# Patient Record
Sex: MALE | Race: WHITE | NOT HISPANIC OR LATINO | Employment: OTHER | URBAN - METROPOLITAN AREA
[De-identification: names, ages, dates, MRNs, and addresses within clinical notes are randomized per-mention and may not be internally consistent; named-entity substitution may affect disease eponyms.]

---

## 2017-01-03 RX ORDER — ALBUTEROL SULFATE 90 UG/1
AEROSOL, METERED RESPIRATORY (INHALATION)
Qty: 8.5 INHALER | Refills: 12 | Status: SHIPPED | OUTPATIENT
Start: 2017-01-03 | End: 2018-01-16 | Stop reason: SDUPTHER

## 2017-04-18 RX ORDER — BUDESONIDE AND FORMOTEROL FUMARATE DIHYDRATE 160; 4.5 UG/1; UG/1
AEROSOL RESPIRATORY (INHALATION)
Qty: 10.2 INHALER | Refills: 8 | Status: SHIPPED | OUTPATIENT
Start: 2017-04-18 | End: 2018-02-12 | Stop reason: SDUPTHER

## 2017-06-18 RX ORDER — LISINOPRIL 20 MG/1
TABLET ORAL
Qty: 30 TABLET | Refills: 0 | Status: SHIPPED | OUTPATIENT
Start: 2017-06-18 | End: 2017-07-17 | Stop reason: SDUPTHER

## 2017-07-17 RX ORDER — LISINOPRIL 20 MG/1
TABLET ORAL
Qty: 30 TABLET | Refills: 0 | Status: SHIPPED | OUTPATIENT
Start: 2017-07-17 | End: 2017-08-16 | Stop reason: SDUPTHER

## 2017-08-16 RX ORDER — TIOTROPIUM BROMIDE 18 UG/1
CAPSULE ORAL; RESPIRATORY (INHALATION)
Qty: 30 CAPSULE | Refills: 7 | Status: SHIPPED | OUTPATIENT
Start: 2017-08-16 | End: 2018-03-22 | Stop reason: SDUPTHER

## 2017-08-16 RX ORDER — LISINOPRIL 20 MG/1
TABLET ORAL
Qty: 30 TABLET | Refills: 0 | Status: SHIPPED | OUTPATIENT
Start: 2017-08-16 | End: 2017-09-15 | Stop reason: SDUPTHER

## 2017-09-15 RX ORDER — LISINOPRIL 20 MG/1
TABLET ORAL
Qty: 30 TABLET | Refills: 1 | Status: SHIPPED | OUTPATIENT
Start: 2017-09-15 | End: 2017-11-14 | Stop reason: SDUPTHER

## 2017-11-14 RX ORDER — LISINOPRIL 20 MG/1
TABLET ORAL
Qty: 30 TABLET | Refills: 2 | Status: SHIPPED | OUTPATIENT
Start: 2017-11-14 | End: 2018-01-16 | Stop reason: SDUPTHER

## 2018-01-16 RX ORDER — ALBUTEROL SULFATE 90 UG/1
AEROSOL, METERED RESPIRATORY (INHALATION)
Qty: 8.5 G | Refills: 12 | Status: SHIPPED | OUTPATIENT
Start: 2018-01-16 | End: 2018-05-02

## 2018-01-16 RX ORDER — LISINOPRIL 20 MG/1
TABLET ORAL
Qty: 30 TABLET | Refills: 0 | Status: SHIPPED | OUTPATIENT
Start: 2018-01-16 | End: 2018-02-17 | Stop reason: SDUPTHER

## 2018-02-12 DIAGNOSIS — J44.9 CHRONIC OBSTRUCTIVE PULMONARY DISEASE, UNSPECIFIED COPD TYPE: Primary | ICD-10-CM

## 2018-02-12 RX ORDER — BUDESONIDE AND FORMOTEROL FUMARATE DIHYDRATE 160; 4.5 UG/1; UG/1
2 AEROSOL RESPIRATORY (INHALATION) EVERY 12 HOURS
Qty: 10.2 INHALER | Refills: 1 | Status: SHIPPED | OUTPATIENT
Start: 2018-02-12 | End: 2018-05-02

## 2018-02-12 NOTE — TELEPHONE ENCOUNTER
----- Message from Dilia Gustafson sent at 2/12/2018 10:08 AM CST -----  Contact: [facility]  Out of med   -     [facility]    Faxed 2/9   No response    SYMBICORT 160-4.5 mcg/actuation [facility]  [facility] [place] [address] - [place] - [address] [facility]

## 2018-02-17 RX ORDER — LISINOPRIL 20 MG/1
TABLET ORAL
Qty: 30 TABLET | Refills: 0 | Status: SHIPPED | OUTPATIENT
Start: 2018-02-17 | End: 2018-03-22 | Stop reason: SDUPTHER

## 2018-03-22 RX ORDER — LISINOPRIL 20 MG/1
TABLET ORAL
Qty: 30 TABLET | Refills: 0 | Status: SHIPPED | OUTPATIENT
Start: 2018-03-22 | End: 2018-06-19 | Stop reason: SDUPTHER

## 2018-03-22 RX ORDER — TIOTROPIUM BROMIDE 18 UG/1
CAPSULE ORAL; RESPIRATORY (INHALATION)
Qty: 30 CAPSULE | Refills: 7 | Status: SHIPPED | OUTPATIENT
Start: 2018-03-22 | End: 2018-05-02

## 2018-04-20 DIAGNOSIS — R05.9 COUGH: Primary | ICD-10-CM

## 2018-05-02 ENCOUNTER — HOSPITAL ENCOUNTER (OUTPATIENT)
Dept: PULMONOLOGY | Facility: CLINIC | Age: 63
Discharge: HOME | End: 2018-05-02
Payer: COMMERCIAL

## 2018-05-02 ENCOUNTER — OFFICE VISIT (OUTPATIENT)
Dept: PULMONOLOGY | Facility: CLINIC | Age: 63
End: 2018-05-02
Payer: COMMERCIAL

## 2018-05-02 VITALS
OXYGEN SATURATION: 99 % | HEART RATE: 67 BPM | HEIGHT: 66 IN | SYSTOLIC BLOOD PRESSURE: 142 MMHG | BODY MASS INDEX: 26.52 KG/M2 | RESPIRATION RATE: 14 BRPM | DIASTOLIC BLOOD PRESSURE: 88 MMHG | WEIGHT: 165 LBS

## 2018-05-02 DIAGNOSIS — I10 ESSENTIAL HYPERTENSION: ICD-10-CM

## 2018-05-02 DIAGNOSIS — K21.9 GASTROESOPHAGEAL REFLUX DISEASE, ESOPHAGITIS PRESENCE NOT SPECIFIED: ICD-10-CM

## 2018-05-02 DIAGNOSIS — R05.9 COUGH: ICD-10-CM

## 2018-05-02 DIAGNOSIS — R93.89 ABNORMAL CHEST X-RAY: ICD-10-CM

## 2018-05-02 DIAGNOSIS — J44.9 CHRONIC OBSTRUCTIVE PULMONARY DISEASE, UNSPECIFIED COPD TYPE: ICD-10-CM

## 2018-05-02 DIAGNOSIS — J43.2 CENTRILOBULAR EMPHYSEMA: Primary | ICD-10-CM

## 2018-05-02 LAB
PRE FEV1 FVC: 50
PRE FEV1: 1.67
PRE FVC: 3.36
PREDICTED FEV1 FVC: 80
PREDICTED FEV1: 2.78
PREDICTED FVC: 3.45

## 2018-05-02 PROCEDURE — 3079F DIAST BP 80-89 MM HG: CPT | Mod: CPTII,S$GLB,, | Performed by: INTERNAL MEDICINE

## 2018-05-02 PROCEDURE — 94010 BREATHING CAPACITY TEST: CPT | Mod: S$GLB,,, | Performed by: INTERNAL MEDICINE

## 2018-05-02 PROCEDURE — 3077F SYST BP >= 140 MM HG: CPT | Mod: CPTII,S$GLB,, | Performed by: INTERNAL MEDICINE

## 2018-05-02 PROCEDURE — 99214 OFFICE O/P EST MOD 30 MIN: CPT | Mod: 25,S$GLB,, | Performed by: INTERNAL MEDICINE

## 2018-05-02 PROCEDURE — 99999 PR PBB SHADOW E&M-EST. PATIENT-LVL IV: CPT | Mod: PBBFAC,,, | Performed by: INTERNAL MEDICINE

## 2018-05-02 PROCEDURE — 94729 DIFFUSING CAPACITY: CPT | Mod: S$GLB,,, | Performed by: INTERNAL MEDICINE

## 2018-05-02 RX ORDER — TIOTROPIUM BROMIDE 18 UG/1
18 CAPSULE ORAL; RESPIRATORY (INHALATION) DAILY
Qty: 30 CAPSULE | Refills: 6 | Status: SHIPPED | OUTPATIENT
Start: 2018-05-02 | End: 2019-02-04 | Stop reason: SDUPTHER

## 2018-05-02 RX ORDER — ALBUTEROL SULFATE 90 UG/1
2 AEROSOL, METERED RESPIRATORY (INHALATION) EVERY 6 HOURS PRN
Qty: 1 EACH | Refills: 6 | Status: SHIPPED | OUTPATIENT
Start: 2018-05-02 | End: 2020-01-15 | Stop reason: SDUPTHER

## 2018-05-02 RX ORDER — BUDESONIDE AND FORMOTEROL FUMARATE DIHYDRATE 160; 4.5 UG/1; UG/1
2 AEROSOL RESPIRATORY (INHALATION) EVERY 12 HOURS
Qty: 1 INHALER | Refills: 12 | Status: SHIPPED | OUTPATIENT
Start: 2018-05-02 | End: 2019-03-22 | Stop reason: SDUPTHER

## 2018-05-02 NOTE — PROGRESS NOTES
Subjective:       Patient ID: Nilo Hoover is a 62 y.o. male.    Chief Complaint: COPD    HPI Mr. Hoover is a 62-year-old former smoker who returns for interval assessment   of chronic obstructive lung disease.  His most recent previous visit here was in   May 2016.  During these past 2 years, he has continued to use bronchodilator   medications as planned.  His current regimen consists of Spiriva, Symbicort and   albuterol.  He has not recognized any adverse effects related to these   medications.  He has not had any acute exacerbations of respiratory symptoms.    He has not had to make use of antibiotics or oral corticosteroids for treatment   of respiratory symptoms within the past 6 to 12 months.    Mr. Hoover remains active.  He does report shortness of breath with moderate   exertion such as mowing his lawn.  He also remains on Prilosec for control of   upper GI symptoms attributed to gastroesophageal reflux.  He takes lisinopril   for treatment of high blood pressure.  He has never recognized any adverse   respiratory effects related to this medication.    DATA:  I have reviewed the results of pulmonary function studies done earlier   today.  The forced vital capacity is 3.36 L, which is 96% of predicted.  The   FEV1 is 1.67 L or 60% of predicted.  The ratio of these values is 50%.  The   diffusion capacity is 18.0.  This is 72% of the expected value.  When today's   pulmonary function study results are compared to previous studies from 2012, and   2009, there has been a modest decline in the vital capacity.  The FEV1 value   has been variable, which likely reflects his varying degrees of airway   obstruction.  Previous studies did not include a DLCO determination.      CB/IN  dd: 05/02/2018 20:49:39 (CDT)  td: 05/03/2018 12:16:58 (CDT)  Doc ID   #[id number]  Job ID #[id number]    CC:       Review of Systems   Constitutional: Negative for fever and fatigue.   HENT: Negative for postnasal drip, sinus pressure,  voice change and congestion.    Respiratory: Positive for dyspnea on extertion. Negative for cough, sputum production, shortness of breath and wheezing.    Cardiovascular: Negative for chest pain and leg swelling.   Genitourinary: Negative for difficulty urinating.   Musculoskeletal: Negative for arthralgias and back pain.   Skin: Negative for rash.   Gastrointestinal: Positive for acid reflux. Negative for abdominal pain.   Neurological: Negative for dizziness and weakness.   Hematological: Negative for adenopathy.       Objective:      Physical Exam   Constitutional: He is oriented to person, place, and time. He appears well-developed and well-nourished.   HENT:   Head: Normocephalic.   Mouth/Throat: Oropharynx is clear and moist. No oropharyngeal exudate.   Neck: Normal range of motion. Neck supple. No JVD present. No tracheal deviation present. No thyromegaly present.   Cardiovascular: Normal rate, regular rhythm and normal heart sounds.    No murmur heard.  Pulmonary/Chest: Symmetric chest wall expansion. No stridor. He has no wheezes. He has no rhonchi. He has no rales. He exhibits no tenderness.   Abdominal: Soft.   Musculoskeletal: He exhibits no edema.   Lymphadenopathy:     He has no cervical adenopathy.   Neurological: He is alert and oriented to person, place, and time.   Skin: Skin is warm and dry. No rash noted. No erythema. Nails show no clubbing.   Psychiatric: He has a normal mood and affect.   Vitals reviewed.    Personal Diagnostic Review    No flowsheet data found.      Assessment:       1. Centrilobular emphysema    2. Essential hypertension    3. Gastroesophageal reflux disease, esophagitis presence not specified    4. Abnormal chest x-ray    5. Chronic obstructive pulmonary disease, unspecified COPD type        Outpatient Encounter Prescriptions as of 5/2/2018   Medication Sig Dispense Refill    albuterol (PROAIR HFA) 90 mcg/actuation inhaler Inhale 2 puffs into the lungs every 6 (six) hours  as needed for Wheezing. 1 each 6    budesonide-formoterol 160-4.5 mcg (SYMBICORT) 160-4.5 mcg/actuation HFAA Inhale 2 puffs into the lungs every 12 (twelve) hours. 1 Inhaler 12    lisinopril (PRINIVIL,ZESTRIL) 20 MG tablet take 1 tablet by mouth once daily 30 tablet 0    omeprazole 20 mg TbEC Take by mouth. 1 Tablet, Delayed Release (E.C.) Oral Every day      tadalafil (CIALIS) 20 MG Tab Take 1 tablet (20 mg total) by mouth as directed. 1 Tablet Oral as directed.  Take as directed 10 tablet 4    tiotropium (SPIRIVA WITH HANDIHALER) 18 mcg inhalation capsule Inhale 1 capsule (18 mcg total) into the lungs once daily. 30 capsule 6    [DISCONTINUED] PROAIR HFA 90 mcg/actuation inhaler inhale 2 puffs by mouth four times a day if needed 8.5 g 12    [DISCONTINUED] SPIRIVA WITH HANDIHALER 18 mcg inhalation capsule inhale the contents of one capsule in the handihaler once daily 30 capsule 7    [DISCONTINUED] SYMBICORT 160-4.5 mcg/actuation HFAA Inhale 2 puffs into the lungs every 12 (twelve) hours. Controller 10.2 Inhaler 1     No facility-administered encounter medications on file as of 5/2/2018.      Orders Placed This Encounter   Procedures    Spirometry without bronchodilator     Standing Status:   Future     Standing Expiration Date:   8/2/2019    DLCO-Carbon Monoxide Diffusing Capacity     Standing Status:   Future     Standing Expiration Date:   8/2/2019     Plan:     Continue present respiratory medications (roles reviewed at today's visit).  Discussed the early role for antibiotics for treatment of respiratory infection.  Return visit 1 year with Spirometry and DLCO.

## 2018-05-02 NOTE — PATIENT INSTRUCTIONS
Continue present respiratory medications (roles reviewed at today's visit).  Discussed the early role for antibiotics for treatment of respiratory infection.  Return visit 1 year with Spirometry and DLCO.

## 2018-05-17 RX ORDER — LISINOPRIL 20 MG/1
TABLET ORAL
Qty: 30 TABLET | Refills: 0 | OUTPATIENT
Start: 2018-05-17

## 2018-05-18 RX ORDER — LISINOPRIL 20 MG/1
TABLET ORAL
Qty: 30 TABLET | Refills: 0 | OUTPATIENT
Start: 2018-05-18

## 2018-06-19 RX ORDER — LISINOPRIL 20 MG/1
20 TABLET ORAL DAILY
Qty: 30 TABLET | Refills: 3 | Status: SHIPPED | OUTPATIENT
Start: 2018-06-19 | End: 2018-08-15 | Stop reason: SDUPTHER

## 2018-06-19 RX ORDER — LISINOPRIL 20 MG/1
TABLET ORAL
Qty: 30 TABLET | Refills: 0 | OUTPATIENT
Start: 2018-06-19

## 2018-06-19 NOTE — TELEPHONE ENCOUNTER
----- Message from Kaushal Elisa sent at 6/19/2018 10:39 AM CDT -----  Contact: Patient [phone]  RX request - refill or new RX.  Is this a refill or new RX:  Refill  RX name and strength: lisinopril (PRINIVIL,ZESTRIL) 20 MG tablet    Is this a 30 day or 90 day RX:  90 Day Supply  Pharmacy name and phone # : [facility] [facility] - [place], [place] - [address] [facility]  Comment: [facility] will close today and change to [facility] requesting Rx to be sent ASAP    Please call an advise  Thank you

## 2018-07-10 ENCOUNTER — TELEPHONE (OUTPATIENT)
Dept: ENDOSCOPY | Facility: HOSPITAL | Age: 63
End: 2018-07-10

## 2018-07-19 ENCOUNTER — TELEPHONE (OUTPATIENT)
Dept: ENDOSCOPY | Facility: HOSPITAL | Age: 63
End: 2018-07-19

## 2018-07-26 ENCOUNTER — TELEPHONE (OUTPATIENT)
Dept: ENDOSCOPY | Facility: HOSPITAL | Age: 63
End: 2018-07-26

## 2018-07-26 NOTE — TELEPHONE ENCOUNTER
Multiple attempts made to schedule follow up colonoscopy. Letter mailed to patient requesting call back to schedule procedure.

## 2018-08-01 ENCOUNTER — PATIENT OUTREACH (OUTPATIENT)
Dept: ADMINISTRATIVE | Facility: HOSPITAL | Age: 63
End: 2018-08-01

## 2018-08-15 ENCOUNTER — OFFICE VISIT (OUTPATIENT)
Dept: INTERNAL MEDICINE | Facility: CLINIC | Age: 63
End: 2018-08-15
Payer: MEDICARE

## 2018-08-15 VITALS
HEIGHT: 68 IN | HEART RATE: 67 BPM | WEIGHT: 169.31 LBS | SYSTOLIC BLOOD PRESSURE: 120 MMHG | BODY MASS INDEX: 25.66 KG/M2 | DIASTOLIC BLOOD PRESSURE: 78 MMHG

## 2018-08-15 DIAGNOSIS — F10.10 ALCOHOL ABUSE: ICD-10-CM

## 2018-08-15 DIAGNOSIS — Z12.11 COLON CANCER SCREENING: ICD-10-CM

## 2018-08-15 DIAGNOSIS — I10 ESSENTIAL HYPERTENSION: ICD-10-CM

## 2018-08-15 DIAGNOSIS — Z00.00 ANNUAL PHYSICAL EXAM: Primary | ICD-10-CM

## 2018-08-15 DIAGNOSIS — Z86.010 PERSONAL HISTORY OF COLONIC POLYPS: ICD-10-CM

## 2018-08-15 DIAGNOSIS — K76.0 FATTY LIVER: ICD-10-CM

## 2018-08-15 DIAGNOSIS — Z12.5 PROSTATE CANCER SCREENING: ICD-10-CM

## 2018-08-15 DIAGNOSIS — J43.2 CENTRILOBULAR EMPHYSEMA: ICD-10-CM

## 2018-08-15 PROCEDURE — 99999 PR PBB SHADOW E&M-EST. PATIENT-LVL IV: CPT | Mod: PBBFAC,,, | Performed by: INTERNAL MEDICINE

## 2018-08-15 PROCEDURE — 3074F SYST BP LT 130 MM HG: CPT | Mod: ,,, | Performed by: INTERNAL MEDICINE

## 2018-08-15 PROCEDURE — 3078F DIAST BP <80 MM HG: CPT | Mod: ,,, | Performed by: INTERNAL MEDICINE

## 2018-08-15 PROCEDURE — 99397 PER PM REEVAL EST PAT 65+ YR: CPT | Mod: S$PBB,,, | Performed by: INTERNAL MEDICINE

## 2018-08-15 PROCEDURE — 99214 OFFICE O/P EST MOD 30 MIN: CPT | Mod: PBBFAC | Performed by: INTERNAL MEDICINE

## 2018-08-15 RX ORDER — LISINOPRIL 20 MG/1
20 TABLET ORAL DAILY
Qty: 30 TABLET | Refills: 11 | Status: SHIPPED | OUTPATIENT
Start: 2018-08-15 | End: 2019-01-18 | Stop reason: SDUPTHER

## 2018-08-15 NOTE — PROGRESS NOTES
"Subjective:       Patient ID: Nilo Hoover is a 63 y.o. male.    Chief Complaint: Annual Exam   This is a 63-year-old presents today for physical.  Patient reports that he has been taking his medication regularly but has not been in for awhile he overall has been feeling well and reports he retired awhile ago .  He has intermittent episodes where he drinks more heavily a bottle a week  and reports that he had been doing that on and off since last visit he stopped drinking 2 weeks ago prior to this visit.  He is a previous smoker and no longer smokes he is following with his pulmonologist and he had a recent appointment his medications were kept the same he denies current cough for increased shortness of breath. He denies abdominal pain or bleeding  He remains on omeprazole otc for gerd and hx gi bleed .    HPI  Review of Systems   Constitutional: Negative for chills, fever and unexpected weight change.   Respiratory: Negative for chest tightness.         Copd stable   recentlyl saw his pulmonologist    Cardiovascular: Negative for chest pain and palpitations.   Gastrointestinal: Negative for abdominal pain, nausea and vomiting.   Skin:        bruies arms at times    Neurological: Negative for weakness.       Objective:     Blood pressure 122/84, pulse 67, height 5' 8" (1.727 m), weight 76.8 kg (169 lb 5 oz).    Physical Exam   Constitutional: No distress.   HENT:   Head: Normocephalic.   Mouth/Throat: Oropharynx is clear and moist.   Eyes: No scleral icterus.   Neck: Neck supple.   Cardiovascular: Normal rate, regular rhythm and normal heart sounds. Exam reveals no gallop and no friction rub.   No murmur heard.  Pulmonary/Chest: Effort normal and breath sounds normal. No respiratory distress.   decreased bs     Abdominal: Soft. Bowel sounds are normal. He exhibits no mass. There is no tenderness.   Genitourinary: Prostate normal.   Musculoskeletal: He exhibits no edema.   Neurological: He is alert.   Skin: No " erythema.   Bruising arm    Psychiatric: He has a normal mood and affect.   Vitals reviewed.      Assessment:       1. Annual physical exam    2. Prostate cancer screening    3. Essential hypertension    4. Fatty liver    5. Personal history of colonic polyps    6. Centrilobular emphysema    7. Colon cancer screening    8. Alcohol abuse        Plan:       Nilo was seen today for annual exam.    Diagnoses and all orders for this visit:    Annual physical exam  -     Basic metabolic panel; Future  -     CBC auto differential; Future  -     Hepatic function panel; Future  -     Lipid panel; Future  -     TSH; Future  -     Hemoglobin A1c; Future    Prostate cancer screening  -     PSA, Screening; Future    Essential hypertension  Blood pressure acceptable will maintain his present regimen refill was provided  -     lisinopril (PRINIVIL,ZESTRIL) 20 MG tablet; Take 1 tablet (20 mg total) by mouth once daily.    Fatty liver with hx heavy intermittant alcohol use  Discussed avoidance reduction and consider hepatology appointment for further evaluation  -     Ambulatory Referral to Hepatology      Centrilobular emphysema  Hx abnormal chest xray   Copd history of he is following with his pulmonologist had a recent appt  and he previously quit smoking    Hx intermittant heavy etoh use advised reduction  Discussed liver       Colon cancer screening  History of colon polyps he is due for colonoscopy order in for jose   -     Case request GI: COLONOSCOPY    Follow up at least annually advised

## 2018-08-16 ENCOUNTER — LAB VISIT (OUTPATIENT)
Dept: LAB | Facility: HOSPITAL | Age: 63
End: 2018-08-16
Payer: MEDICARE

## 2018-08-16 ENCOUNTER — PROCEDURE VISIT (OUTPATIENT)
Dept: HEPATOLOGY | Facility: CLINIC | Age: 63
End: 2018-08-16
Attending: PHYSICIAN ASSISTANT
Payer: MEDICARE

## 2018-08-16 ENCOUNTER — OFFICE VISIT (OUTPATIENT)
Dept: HEPATOLOGY | Facility: CLINIC | Age: 63
End: 2018-08-16
Payer: MEDICARE

## 2018-08-16 VITALS
HEART RATE: 74 BPM | TEMPERATURE: 97 F | SYSTOLIC BLOOD PRESSURE: 182 MMHG | DIASTOLIC BLOOD PRESSURE: 96 MMHG | WEIGHT: 165.56 LBS | RESPIRATION RATE: 18 BRPM | OXYGEN SATURATION: 96 % | BODY MASS INDEX: 25.09 KG/M2 | HEIGHT: 68 IN

## 2018-08-16 DIAGNOSIS — R74.8 ELEVATED LIVER ENZYMES: Primary | ICD-10-CM

## 2018-08-16 DIAGNOSIS — K76.0 HEPATIC STEATOSIS: ICD-10-CM

## 2018-08-16 DIAGNOSIS — R74.8 ELEVATED LIVER ENZYMES: ICD-10-CM

## 2018-08-16 LAB
ALBUMIN SERPL BCP-MCNC: 3.9 G/DL
ALP SERPL-CCNC: 60 U/L
ALT SERPL W/O P-5'-P-CCNC: 24 U/L
ANION GAP SERPL CALC-SCNC: 11 MMOL/L
AST SERPL-CCNC: 22 U/L
BASOPHILS # BLD AUTO: 0.07 K/UL
BASOPHILS NFR BLD: 1 %
BILIRUB DIRECT SERPL-MCNC: 0.2 MG/DL
BILIRUB SERPL-MCNC: 0.4 MG/DL
BUN SERPL-MCNC: 8 MG/DL
CALCIUM SERPL-MCNC: 9.9 MG/DL
CERULOPLASMIN SERPL-MCNC: 29 MG/DL
CHLORIDE SERPL-SCNC: 103 MMOL/L
CO2 SERPL-SCNC: 27 MMOL/L
CREAT SERPL-MCNC: 1 MG/DL
DIFFERENTIAL METHOD: ABNORMAL
EOSINOPHIL # BLD AUTO: 0.2 K/UL
EOSINOPHIL NFR BLD: 3.6 %
ERYTHROCYTE [DISTWIDTH] IN BLOOD BY AUTOMATED COUNT: 12.7 %
EST. GFR  (AFRICAN AMERICAN): >60 ML/MIN/1.73 M^2
EST. GFR  (NON AFRICAN AMERICAN): >60 ML/MIN/1.73 M^2
FERRITIN SERPL-MCNC: 207 NG/ML
GLUCOSE SERPL-MCNC: 87 MG/DL
HCT VFR BLD AUTO: 41.6 %
HGB BLD-MCNC: 14.7 G/DL
IMM GRANULOCYTES # BLD AUTO: 0.05 K/UL
IMM GRANULOCYTES NFR BLD AUTO: 0.7 %
INR PPP: 0.9
IRON SERPL-MCNC: 80 UG/DL
LYMPHOCYTES # BLD AUTO: 1.9 K/UL
LYMPHOCYTES NFR BLD: 27.8 %
MCH RBC QN AUTO: 35.4 PG
MCHC RBC AUTO-ENTMCNC: 35.3 G/DL
MCV RBC AUTO: 100 FL
MONOCYTES # BLD AUTO: 0.7 K/UL
MONOCYTES NFR BLD: 10.6 %
NEUTROPHILS # BLD AUTO: 3.8 K/UL
NEUTROPHILS NFR BLD: 56.3 %
NRBC BLD-RTO: 0 /100 WBC
PLATELET # BLD AUTO: 234 K/UL
PMV BLD AUTO: 10 FL
POTASSIUM SERPL-SCNC: 3.8 MMOL/L
PROT SERPL-MCNC: 7.5 G/DL
PROTHROMBIN TIME: 9.8 SEC
RBC # BLD AUTO: 4.15 M/UL
SATURATED IRON: 19 %
SODIUM SERPL-SCNC: 141 MMOL/L
TOTAL IRON BINDING CAPACITY: 432 UG/DL
TRANSFERRIN SERPL-MCNC: 292 MG/DL
WBC # BLD AUTO: 6.68 K/UL

## 2018-08-16 PROCEDURE — 86704 HEP B CORE ANTIBODY TOTAL: CPT

## 2018-08-16 PROCEDURE — 91200 LIVER ELASTOGRAPHY: CPT | Mod: 26,S$PBB,, | Performed by: PHYSICIAN ASSISTANT

## 2018-08-16 PROCEDURE — 99999 PR PBB SHADOW E&M-EST. PATIENT-LVL IV: CPT | Mod: PBBFAC,,, | Performed by: PHYSICIAN ASSISTANT

## 2018-08-16 PROCEDURE — 82248 BILIRUBIN DIRECT: CPT

## 2018-08-16 PROCEDURE — 85610 PROTHROMBIN TIME: CPT

## 2018-08-16 PROCEDURE — 99204 OFFICE O/P NEW MOD 45 MIN: CPT | Mod: S$PBB,,, | Performed by: PHYSICIAN ASSISTANT

## 2018-08-16 PROCEDURE — 86038 ANTINUCLEAR ANTIBODIES: CPT

## 2018-08-16 PROCEDURE — 80053 COMPREHEN METABOLIC PANEL: CPT

## 2018-08-16 PROCEDURE — 3077F SYST BP >= 140 MM HG: CPT | Mod: ,,, | Performed by: PHYSICIAN ASSISTANT

## 2018-08-16 PROCEDURE — 3008F BODY MASS INDEX DOCD: CPT | Mod: ,,, | Performed by: PHYSICIAN ASSISTANT

## 2018-08-16 PROCEDURE — 82104 ALPHA-1-ANTITRYPSIN PHENO: CPT

## 2018-08-16 PROCEDURE — 91200 LIVER ELASTOGRAPHY: CPT | Mod: PBBFAC | Performed by: PHYSICIAN ASSISTANT

## 2018-08-16 PROCEDURE — 82728 ASSAY OF FERRITIN: CPT

## 2018-08-16 PROCEDURE — 86235 NUCLEAR ANTIGEN ANTIBODY: CPT | Mod: 91

## 2018-08-16 PROCEDURE — 83540 ASSAY OF IRON: CPT

## 2018-08-16 PROCEDURE — 82390 ASSAY OF CERULOPLASMIN: CPT

## 2018-08-16 PROCEDURE — 86790 VIRUS ANTIBODY NOS: CPT

## 2018-08-16 PROCEDURE — 85025 COMPLETE CBC W/AUTO DIFF WBC: CPT

## 2018-08-16 PROCEDURE — 3080F DIAST BP >= 90 MM HG: CPT | Mod: ,,, | Performed by: PHYSICIAN ASSISTANT

## 2018-08-16 PROCEDURE — 36415 COLL VENOUS BLD VENIPUNCTURE: CPT

## 2018-08-16 PROCEDURE — 86256 FLUORESCENT ANTIBODY TITER: CPT

## 2018-08-16 PROCEDURE — 86706 HEP B SURFACE ANTIBODY: CPT

## 2018-08-16 PROCEDURE — 80307 DRUG TEST PRSMV CHEM ANLYZR: CPT

## 2018-08-16 PROCEDURE — 87340 HEPATITIS B SURFACE AG IA: CPT

## 2018-08-16 PROCEDURE — 99214 OFFICE O/P EST MOD 30 MIN: CPT | Mod: PBBFAC,25 | Performed by: PHYSICIAN ASSISTANT

## 2018-08-16 PROCEDURE — 80321 ALCOHOLS BIOMARKERS 1OR 2: CPT

## 2018-08-16 NOTE — PROGRESS NOTES
I have reviewed and concur with the KELLIE's history, physical, assessment, and plan.  I have personally interviewed and examined the patient at bedside.  See below addendum for my evaluation and additional findings.     63 y.o. male that presents for evaluation of intermittent elevation of liver tests and hepatic steatosis in the setting of excess alcohol use.  Patient reports discontinuing alcohol use 1 week ago.  Will plan for updated labs including serologic w/u to exclude other etiologies of chronic liver disease.  Fibroscan for fibrosis staging.   Reinforced importance of complete alcohol abstinence given concern for alcohol related liver disease and patient expresses agreement.  Discussed use of social resources if patient has difficulty maintaining sobriety.      Patient will return to clinic with QUETA Eason

## 2018-08-16 NOTE — LETTER
August 16, 2018      Yasmin Prather MD  [address]  [facility] [address]           [facility] - Hepatology  [address]  [facility] [address]  Phone: [phone]  Fax: [phone]          Patient: Nilo Hoover   MR Number: [id number]   YOB: 1955   Date of Visit: 8/16/2018       Dear Dr. Yasmin Prather:    Thank you for referring Nilo Hoover to me for evaluation. Attached you will find relevant portions of my assessment and plan of care.    If you have questions, please do not hesitate to call me. I look forward to following Nilo Hoover along with you.    Sincerely,    Ramon Tolentino PA-C    Enclosure  CC:  No Recipients    If you would like to receive this communication electronically, please contact [email or web address] or [phone] to request more information on [email or web address] Link access.    For providers and/or their staff who would like to refer a patient to [facility], please contact us through our one-stop-shop provider referral line, [facility] [place], at [phone].    If you feel you have received this communication in error or would no longer like to receive these types of communications, please e-mail [email or web address]

## 2018-08-16 NOTE — PROGRESS NOTES
HEPATOLOGY CLINIC VISIT NOTE     REFERRING PROVIDER: Dr. Yasmin Simms*    REASON FOR VISIT: elevated liver enzymes, fatty liver     HISTORY: This is a 63 y.o. White male here for elevated fatty liver, referred by PCP. Pt has a h/o elevated liver enzymes, although they have not been repeated since 2016, so an U/S was ordered that noted hepatic steatosis. He only has a few sets of liver enzymes to trend. He has a significant h/o ETOH consumption and they have improved in the past with d/c. He has not had formal serological evaluation or fibrosis staging.    Ref. Range 8/26/2014 08:18 4/29/2016 08:34 6/1/2016 13:57   AST Latest Ref Range: 10 - 40 U/L 20 148 (H) 42 (H)   ALT Latest Ref Range: 10 - 44 U/L 18 91 (H) 39      Ref. Range 8/26/2014 08:18 4/29/2016 08:34 6/1/2016 13:57   Alkaline Phosphatase Latest Ref Range: 55 - 135 U/L 48 (L) 170 (H) 78     He has had negative HCV testing in past.     He reports he d/omaira ETOH 1 week ago. He reports troubles with stopping, still craves. Shakes when first started but have d/omaira. He drinks NAB when he goes out with friends.     Pt denies signs of hepatic decompensation including: jaundice, dark urine, abdominal distention, hematemesis, melena, slowed mentation.    Liver staging:  No formal staging  No recent enzymes or LFTs                Past Medical History:   Diagnosis Date    Bronchitis chronic     COPD (chronic obstructive pulmonary disease)     Degenerative disc disease     lumar spine     Erectile dysfunction     GERD (gastroesophageal reflux disease)     hx gastritis    H/O: GI bleed     HTN (hypertension) 2/26/2013    Personal history of colonic polyps 8/9    Seizures        Past Surgical History:   Procedure Laterality Date    COLONOSCOPY W/ POLYPECTOMY      ESOPHAGOGASTRODUODENOSCOPY      TONSILLECTOMY       FAMILY HISTORY: Negative for liver disease    SOCIAL HISTORY:     Former - special ed    Social History     Tobacco Use    Smoking Status Former Smoker    Packs/day: 1.50    Years: 35.00    Pack years: 52.50    Types: Cigarettes    Last attempt to quit: 2009    Years since quittin.5   Smokeless Tobacco Never Used       Social History     Substance and Sexual Activity   Alcohol Use Yes    Alcohol/week: 1.0 oz    Types: 2 Standard drinks or equivalent per week    Comment: every other day   none since 1 week  Bottle of whisky per week, beer when going out  5-6 oz of whiskey per day    Social History     Substance and Sexual Activity   Drug Use No       ROS:   No fever, chills,  No chest pain,  dyspnea, cough  No abdominal pain, nausea, vomiting  No headaches, visual changes  No lower extremity edema  No depression or anxiety      PHYSICAL EXAM:  Friendly White male, in no acute distress; alert and oriented to person, place and time  VITALS: reviewed  HEENT: Sclerae anicteric.   NECK: Supple  CVS: Regular rate and rhythm. No murmurs  LUNGS: Normal respiratory effort. Clear bilaterally  ABDOMEN: Flat, soft, nontender. No organomegaly or masses. No ascites or hernias.   SKIN: Warm and dry. No jaundice, No obvious rashes. (+) telangectasias to chest   EXTREMITIES: No lower extremity edema  NEURO/PSYCH: Normal gate. Memory intact. Thought and speech pattern appropriate. Behavior normal. No depression or anxiety noted.    RECENT LABS:  Lab Results   Component Value Date    WBC 6.28 2016    HGB 14.4 2016     2016     Lab Results   Component Value Date    INR 1.0 02/15/2013     Lab Results   Component Value Date    AST 42 (H) 2016    ALT 39 2016    BILITOT 0.7 2016    ALBUMIN 3.7 2016    ALKPHOS 78 2016    CREATININE 1.1 2016    BUN 9 2016     2016    K 4.7 2016     RECENT IMAGING:  U/S abdomen  Details     Reading Physician Reading Date Result Priority   Alva Damon MD 2016    Mati Soni MD 2016       Narrative     Time of  Procedure: 05/13/16 13:30:00  Accession # [id number]    Reason for study: Abnormal LFTs    Comparison: None.    Findings: The liver is normal in size measuring 15.6cm.  Hepatic parenchyma is attenuating, with an HRI measuring 2.9.  There are no focal hepatic masses.  No intra- or extrahepatic biliary ductal dilatation. The common bile duct measures 0.4 cm.  The gallbladder appears normal. No evidence for cholelithiasis.  Sonographic Cedeno's sign is negative. The visualized portions of the pancreas, aorta and IVC appear normal. The kidneys are normal in size and measure 10.9 cm on the right and 10.2 cm on the left. The spleen is normal in size and measures 11.8 x 3.6 cm. No ascites.      Impression         Attenuation of the hepatic parenchyma suggestive of hepatic steatosis.     ASSESSMENT  63 y.o. White male with:  1. ELEVATED LIVER ENZYMESHEPATIC STEATOSIS  -- suspect ETOH over NAFLD/MORALES  -- full serological work up  -- stage fibrosis with fibroscan  -- discussed ETOH cessation and resources for quitting    EDUCATION:  Discussed need to minimize and abstain completely from alcohol as this is causing liver dysfunction. Discussed that alcohol abuse can cause cirrhosis. Pt understands that continued alcohol abuse can be detrimental to his liver. Discussed potential outcomes of cirrhosis, including liver cancer, liver failure, liver transplant, death.     PLAN:  1. Labs today  2. Fibroscan  3. F/u TBD based on labs     Thank you for allowing me to participate in the care of Nilo Tolentino PA-C

## 2018-08-17 ENCOUNTER — TELEPHONE (OUTPATIENT)
Dept: HEPATOLOGY | Facility: CLINIC | Age: 63
End: 2018-08-17

## 2018-08-17 LAB
ANA SER QL IF: NORMAL
HBV CORE AB SERPL QL IA: NEGATIVE
HBV SURFACE AB SER-ACNC: NEGATIVE M[IU]/ML
HBV SURFACE AG SERPL QL IA: NEGATIVE
HEPATITIS A ANTIBODY, IGG: NEGATIVE
MITOCHONDRIA AB TITR SER IF: NORMAL {TITER}
SMOOTH MUSCLE AB TITR SER IF: NORMAL {TITER}

## 2018-08-17 NOTE — TELEPHONE ENCOUNTER
Message from  Ramon BIRCH;  Please let him know that the fibroscan shows no significant fibrosis at F0-F1. I will let him know how his labs look.    Message relayed to the patient. Questions answered. Voiced understanding.

## 2018-08-17 NOTE — PROCEDURES
Fibroscan Procedure     Name: Nilo Hoover  Date of Procedure : 2018   :: Ramon Tolentino PA-C  Diagnosis: Alcohol    Probe: M    Fibroscan readin.3 KPa    Fibrosis:F 0-1     CAP readin dB/m    Steatosis: :S2

## 2018-08-17 NOTE — TELEPHONE ENCOUNTER
Please let him know that the fibroscan shows no significant fibrosis at F0-F1. I will let him know how his labs look

## 2018-08-18 LAB
AMPHETAMINES SERPL QL: NEGATIVE
BARBITURATES SERPL QL SCN: NEGATIVE
BENZODIAZ SERPL QL: NEGATIVE
CANNABINOIDS SERPL QL: NEGATIVE
COCAINE, BLOOD: NEGATIVE
ETHANOL SERPL-MCNC: NEGATIVE MG/DL
METHADONE SERPL QL SCN: NEGATIVE
OPIATES SERPL QL SCN: NEGATIVE
PCP SERPL QL SCN: NEGATIVE
PROPOXYPH SERPL QL: NEGATIVE

## 2018-08-20 ENCOUNTER — LAB VISIT (OUTPATIENT)
Dept: LAB | Facility: HOSPITAL | Age: 63
End: 2018-08-20
Attending: INTERNAL MEDICINE
Payer: MEDICARE

## 2018-08-20 DIAGNOSIS — Z12.5 PROSTATE CANCER SCREENING: ICD-10-CM

## 2018-08-20 DIAGNOSIS — Z00.00 ANNUAL PHYSICAL EXAM: ICD-10-CM

## 2018-08-20 LAB
ALBUMIN SERPL BCP-MCNC: 3.7 G/DL
ALP SERPL-CCNC: 52 U/L
ALT SERPL W/O P-5'-P-CCNC: 21 U/L
ANION GAP SERPL CALC-SCNC: 9 MMOL/L
AST SERPL-CCNC: 24 U/L
BASOPHILS # BLD AUTO: 0.03 K/UL
BASOPHILS NFR BLD: 0.5 %
BILIRUB DIRECT SERPL-MCNC: 0.2 MG/DL
BILIRUB SERPL-MCNC: 0.5 MG/DL
BUN SERPL-MCNC: 12 MG/DL
CALCIUM SERPL-MCNC: 9.8 MG/DL
CHLORIDE SERPL-SCNC: 104 MMOL/L
CHOLEST SERPL-MCNC: 178 MG/DL
CHOLEST/HDLC SERPL: 3.3 {RATIO}
CO2 SERPL-SCNC: 27 MMOL/L
COMPLEXED PSA SERPL-MCNC: 0.5 NG/ML
CREAT SERPL-MCNC: 1.1 MG/DL
DIFFERENTIAL METHOD: ABNORMAL
EOSINOPHIL # BLD AUTO: 0.2 K/UL
EOSINOPHIL NFR BLD: 3.9 %
ERYTHROCYTE [DISTWIDTH] IN BLOOD BY AUTOMATED COUNT: 12.7 %
EST. GFR  (AFRICAN AMERICAN): >60 ML/MIN/1.73 M^2
EST. GFR  (NON AFRICAN AMERICAN): >60 ML/MIN/1.73 M^2
ESTIMATED AVG GLUCOSE: 108 MG/DL
GLUCOSE SERPL-MCNC: 107 MG/DL
HBA1C MFR BLD HPLC: 5.4 %
HCT VFR BLD AUTO: 39.7 %
HDLC SERPL-MCNC: 54 MG/DL
HDLC SERPL: 30.3 %
HGB BLD-MCNC: 13.6 G/DL
LDLC SERPL CALC-MCNC: 107.6 MG/DL
LYMPHOCYTES # BLD AUTO: 1.4 K/UL
LYMPHOCYTES NFR BLD: 25.5 %
MCH RBC QN AUTO: 34.7 PG
MCHC RBC AUTO-ENTMCNC: 34.3 G/DL
MCV RBC AUTO: 101 FL
MONOCYTES # BLD AUTO: 0.5 K/UL
MONOCYTES NFR BLD: 9.6 %
NEUTROPHILS # BLD AUTO: 3.4 K/UL
NEUTROPHILS NFR BLD: 60.3 %
NONHDLC SERPL-MCNC: 124 MG/DL
PLATELET # BLD AUTO: 239 K/UL
PMV BLD AUTO: 8.9 FL
POTASSIUM SERPL-SCNC: 4.2 MMOL/L
PROT SERPL-MCNC: 6.9 G/DL
RBC # BLD AUTO: 3.92 M/UL
SODIUM SERPL-SCNC: 140 MMOL/L
TRIGL SERPL-MCNC: 82 MG/DL
TSH SERPL DL<=0.005 MIU/L-ACNC: 0.97 UIU/ML
WBC # BLD AUTO: 5.61 K/UL

## 2018-08-20 PROCEDURE — 83036 HEMOGLOBIN GLYCOSYLATED A1C: CPT

## 2018-08-20 PROCEDURE — 80076 HEPATIC FUNCTION PANEL: CPT

## 2018-08-20 PROCEDURE — 84443 ASSAY THYROID STIM HORMONE: CPT

## 2018-08-20 PROCEDURE — 80061 LIPID PANEL: CPT

## 2018-08-20 PROCEDURE — 80048 BASIC METABOLIC PNL TOTAL CA: CPT

## 2018-08-20 PROCEDURE — 85025 COMPLETE CBC W/AUTO DIFF WBC: CPT

## 2018-08-20 PROCEDURE — 36415 COLL VENOUS BLD VENIPUNCTURE: CPT

## 2018-08-20 PROCEDURE — 84153 ASSAY OF PSA TOTAL: CPT

## 2018-08-21 ENCOUNTER — TELEPHONE (OUTPATIENT)
Dept: HEPATOLOGY | Facility: CLINIC | Age: 63
End: 2018-08-21

## 2018-08-21 ENCOUNTER — TELEPHONE (OUTPATIENT)
Dept: ENDOSCOPY | Facility: HOSPITAL | Age: 63
End: 2018-08-21

## 2018-08-21 LAB
A1AT PHENOTYP SERPL-IMP: NORMAL BANDS
A1AT SERPL NEPH-MCNC: 117 MG/DL
PHOSPHATIDYLETHANOL (PETH): 229 NG/ML

## 2018-08-21 NOTE — TELEPHONE ENCOUNTER
Spoke w/pt. & advised him his liver enzymes have improved, with also negative additional causes of liver disease. Advised pt. There would be a recall ltr in mail for 6mo F/U.......the patient. Acknowledged///////

## 2018-08-21 NOTE — TELEPHONE ENCOUNTER
----- Message from Ramon Tolentino PA-C sent at 8/21/2018  4:23 PM CDT -----  Please let him know that work up was negative for additional causes of liver disease. His liver enzymes are now improving and he has no significant scar tissue in the liver. I would recommend f/u in 6 months to repeat labs  Please enter recall  thanks

## 2018-08-22 DIAGNOSIS — D64.9 ANEMIA, UNSPECIFIED TYPE: Primary | ICD-10-CM

## 2018-08-30 ENCOUNTER — TELEPHONE (OUTPATIENT)
Dept: INTERNAL MEDICINE | Facility: CLINIC | Age: 63
End: 2018-08-30

## 2018-08-30 NOTE — TELEPHONE ENCOUNTER
----- Message from Yasmin Prather MD sent at 8/22/2018  8:22 AM CDT -----  Call and noitfy pt his blood work overall was acceptable   Prostate and blood sugar normal   Cholesterol looked good and liver test were acceptable  His blood count was slightly low recommend a multivitamind with iron   And recheck in 1 month notify pt and [facility]

## 2018-09-20 DIAGNOSIS — Z12.11 SPECIAL SCREENING FOR MALIGNANT NEOPLASMS, COLON: Primary | ICD-10-CM

## 2018-09-20 RX ORDER — SODIUM, POTASSIUM,MAG SULFATES 17.5-3.13G
1 SOLUTION, RECONSTITUTED, ORAL ORAL ONCE
Qty: 1 BOTTLE | Refills: 0 | Status: ON HOLD | OUTPATIENT
Start: 2018-09-20 | End: 2018-09-21 | Stop reason: HOSPADM

## 2018-09-21 ENCOUNTER — ANESTHESIA (OUTPATIENT)
Dept: ENDOSCOPY | Facility: HOSPITAL | Age: 63
End: 2018-09-21
Payer: MEDICARE

## 2018-09-21 ENCOUNTER — HOSPITAL ENCOUNTER (OUTPATIENT)
Facility: HOSPITAL | Age: 63
Discharge: HOME | End: 2018-09-21
Attending: INTERNAL MEDICINE | Admitting: INTERNAL MEDICINE
Payer: MEDICARE

## 2018-09-21 ENCOUNTER — ANESTHESIA EVENT (OUTPATIENT)
Dept: ENDOSCOPY | Facility: HOSPITAL | Age: 63
End: 2018-09-21
Payer: MEDICARE

## 2018-09-21 VITALS
TEMPERATURE: 98 F | HEIGHT: 68 IN | BODY MASS INDEX: 25.01 KG/M2 | SYSTOLIC BLOOD PRESSURE: 147 MMHG | DIASTOLIC BLOOD PRESSURE: 77 MMHG | RESPIRATION RATE: 17 BRPM | HEART RATE: 63 BPM | OXYGEN SATURATION: 96 % | WEIGHT: 165 LBS

## 2018-09-21 DIAGNOSIS — Z86.010 PERSONAL HISTORY OF COLONIC POLYPS: Primary | ICD-10-CM

## 2018-09-21 DIAGNOSIS — Z86.010 HISTORY OF COLON POLYPS: ICD-10-CM

## 2018-09-21 PROBLEM — Z86.0100 HISTORY OF COLON POLYPS: Status: ACTIVE | Noted: 2018-09-21

## 2018-09-21 PROCEDURE — 25000003 PHARM REV CODE 250: Performed by: INTERNAL MEDICINE

## 2018-09-21 PROCEDURE — 88305 TISSUE EXAM BY PATHOLOGIST: CPT | Mod: 26,,, | Performed by: PATHOLOGY

## 2018-09-21 PROCEDURE — 45385 COLONOSCOPY W/LESION REMOVAL: CPT | Mod: PT,,, | Performed by: INTERNAL MEDICINE

## 2018-09-21 PROCEDURE — 37000008 HC ANESTHESIA 1ST 15 MINUTES: Performed by: INTERNAL MEDICINE

## 2018-09-21 PROCEDURE — 63600175 PHARM REV CODE 636 W HCPCS: Performed by: NURSE ANESTHETIST, CERTIFIED REGISTERED

## 2018-09-21 PROCEDURE — 88305 TISSUE EXAM BY PATHOLOGIST: CPT | Mod: 59 | Performed by: PATHOLOGY

## 2018-09-21 PROCEDURE — 27201012 HC FORCEPS, HOT/COLD, DISP: Performed by: INTERNAL MEDICINE

## 2018-09-21 PROCEDURE — 27201089 HC SNARE, DISP (ANY): Performed by: INTERNAL MEDICINE

## 2018-09-21 PROCEDURE — 45385 COLONOSCOPY W/LESION REMOVAL: CPT | Performed by: INTERNAL MEDICINE

## 2018-09-21 PROCEDURE — 37000009 HC ANESTHESIA EA ADD 15 MINS: Performed by: INTERNAL MEDICINE

## 2018-09-21 PROCEDURE — E9220 PRA ENDO ANESTHESIA: HCPCS | Mod: PT,,, | Performed by: NURSE ANESTHETIST, CERTIFIED REGISTERED

## 2018-09-21 RX ORDER — PROPOFOL 10 MG/ML
VIAL (ML) INTRAVENOUS CONTINUOUS PRN
Status: DISCONTINUED | OUTPATIENT
Start: 2018-09-21 | End: 2018-09-21

## 2018-09-21 RX ORDER — PROPOFOL 10 MG/ML
VIAL (ML) INTRAVENOUS
Status: DISCONTINUED | OUTPATIENT
Start: 2018-09-21 | End: 2018-09-21

## 2018-09-21 RX ORDER — LIDOCAINE HCL/PF 100 MG/5ML
SYRINGE (ML) INTRAVENOUS
Status: DISCONTINUED | OUTPATIENT
Start: 2018-09-21 | End: 2018-09-21

## 2018-09-21 RX ORDER — SODIUM CHLORIDE 9 MG/ML
INJECTION, SOLUTION INTRAVENOUS CONTINUOUS
Status: DISCONTINUED | OUTPATIENT
Start: 2018-09-21 | End: 2018-09-21 | Stop reason: HOSPADM

## 2018-09-21 RX ADMIN — PROPOFOL 150 MCG/KG/MIN: 10 INJECTION, EMULSION INTRAVENOUS at 11:09

## 2018-09-21 RX ADMIN — LIDOCAINE HYDROCHLORIDE 50 MG: 20 INJECTION, SOLUTION INTRAVENOUS at 11:09

## 2018-09-21 RX ADMIN — SODIUM CHLORIDE: 0.9 INJECTION, SOLUTION INTRAVENOUS at 10:09

## 2018-09-21 RX ADMIN — PROPOFOL 80 MG: 10 INJECTION, EMULSION INTRAVENOUS at 11:09

## 2018-09-21 NOTE — H&P
Short Stay Endoscopy History and Physical    PCP - Yasmin Prather MD    Procedure - Colonoscopy  Sedation: GA  ASA - per anesthesia  Mallampati - per anesthesia  History of Anesthesia problems - no  Family history Anesthesia problems -  no     HPI:  This is a 63 y.o. male here for evaluation of : History of colon polyps    Reflux - no  Dysphagia - no  Abdominal pain - no  Diarrhea - no    ROS:  Constitutional: No fevers, chills, No weight loss  ENT: No allergies  CV: No chest pain  Pulm: No cough, No shortness of breath  Ophtho: No vision changes  GI: see HPI  Medical History:  has a past medical history of Bronchitis chronic, COPD (chronic obstructive pulmonary disease), Degenerative disc disease, Erectile dysfunction, GERD (gastroesophageal reflux disease), H/O: GI bleed, HTN (hypertension) (2/26/2013), Personal history of colonic polyps (8/9), and Seizures.    Surgical History:  has a past surgical history that includes Tonsillectomy; Colonoscopy w/ polypectomy; Esophagogastroduodenoscopy; colonoscpy (N/A, 10/2/2014); and BRONCHOSCOPY, WITH FLUOROSCOPY (Right, 2/15/2013).    Family History: family history includes COPD in his father; Cancer in his mother; Emphysema in his father; Heart disease in his mother; Hypertension in his brother; Seizures in his brother and son.. Otherwise no colon cancer, inflammatory bowel disease, or GI malignancies.    Social History:  reports that he quit smoking about 9 years ago. His smoking use included cigarettes. He has a 52.50 pack-year smoking history. he has never used smokeless tobacco. He reports that he drinks about 1.0 oz of alcohol per week. He reports that he does not use drugs.    Review of patient's allergies indicates:  No Known Allergies    Medications:   Medications Prior to Admission   Medication Sig Dispense Refill Last Dose    albuterol (PROAIR HFA) 90 mcg/actuation inhaler Inhale 2 puffs into the lungs every 6 (six) hours as needed for Wheezing. 1 each 6  Taking    budesonide-formoterol 160-4.5 mcg (SYMBICORT) 160-4.5 mcg/actuation HFAA Inhale 2 puffs into the lungs every 12 (twelve) hours. 1 Inhaler 12 Taking    lisinopril (PRINIVIL,ZESTRIL) 20 MG tablet Take 1 tablet (20 mg total) by mouth once daily. 30 tablet 11 Taking    omeprazole 20 mg TbEC Take by mouth. 1 Tablet, Delayed Release (E.C.) Oral Every day   Taking    [] sodium,potassium,mag sulfates (SUPREP BOWEL PREP KIT) 17.5-3.13-1.6 gram SolR Take 177 mLs by mouth once. for 1 dose 1 Bottle 0     tadalafil (CIALIS) 20 MG Tab Take 1 tablet (20 mg total) by mouth as directed. 1 Tablet Oral as directed.  Take as directed 10 tablet 4 Unknown    tiotropium (SPIRIVA WITH HANDIHALER) 18 mcg inhalation capsule Inhale 1 capsule (18 mcg total) into the lungs once daily. 30 capsule 6 Taking       Objective Findings:    Vital Signs: Per nursing notes.    Physical Exam:  General Appearance: Well appearing in no acute distress  Head:   Normocephalic, without obvious abnormality  Eyes:    No scleral icterus  Airway: Open  Neck: No restriction in mobility  Lungs: CTA bilaterally in anterior and posterior fields, no wheezes, no crackles.  Heart:  Regular rate and rhythm, S1, S2 normal, no murmurs heard  Abdomen: Soft, non tender, non distended      Labs:  Lab Results   Component Value Date    WBC 5.61 2018    HGB 13.6 (L) 2018    HCT 39.7 (L) 2018     2018    CHOL 178 2018    TRIG 82 2018    HDL 54 2018    ALT 21 2018    AST 24 2018     2018    K 4.2 2018     2018    CREATININE 1.1 2018    BUN 12 2018    CO2 27 2018    TSH 0.975 2018    PSA 0.50 2018    INR 0.9 2018    HGBA1C 5.4 2018         I have explained the risks and benefits of endoscopy procedures to the patient including but not limited to bleeding, perforation, infection, and death.    Thank you so much for allowing me to  participate in the care of Nilo Garzon MD

## 2018-09-21 NOTE — PROVATION PATIENT INSTRUCTIONS
Discharge Summary/Instructions after an Endoscopic Procedure  Patient Name: Nilo Hoover  Patient MRN: [id number]  Patient YOB: 1955 Friday, September 21, 2018  Minesh Garzon MD  RESTRICTIONS:  During your procedure today, you received medications for sedation.  These   medications may affect your judgment, balance and coordination.  Therefore,   for 24 hours, you have the following restrictions:   - DO NOT drive a car, operate machinery, make legal/financial decisions,   sign important papers or drink alcohol.    ACTIVITY:  Today: no heavy lifting, straining or running due to procedural   sedation/anesthesia.  The following day: return to full activity including work.  DIET:  Eat and drink normally unless instructed otherwise.     TREATMENT FOR COMMON SIDE EFFECTS:  - Mild abdominal pain, nausea, belching, bloating or excessive gas:  rest,   eat lightly and use a heating pad.  - Sore Throat: treat with throat lozenges and/or gargle with warm salt   water.  - Because air was used during the procedure, expelling large amounts of air   from your rectum or belching is normal.  - If a bowel prep was taken, you may not have a bowel movement for 1-3 days.    This is normal.  SYMPTOMS TO WATCH FOR AND REPORT TO YOUR PHYSICIAN:  1. Abdominal pain or bloating, other than gas cramps.  2. Chest pain.  3. Back pain.  4. Signs of infection such as: chills or fever occurring within 24 hours   after the procedure.  5. Rectal bleeding, which would show as bright red, maroon, or black stools.   (A tablespoon of blood from the rectum is not serious, especially if   hemorrhoids are present.)  6. Vomiting.  7. Weakness or dizziness.  GO DIRECTLY TO THE NEAREST EMERGENCY ROOM IF YOU HAVE ANY OF THE FOLLOWING:      Difficulty breathing              Chills and/or fever over 101 F   Persistent vomiting and/or vomiting blood   Severe abdominal pain   Severe chest pain   Black, tarry stools   Bleeding- more than one  tablespoon   Any other symptom or condition that you feel may need urgent attention  Your doctor recommends these additional instructions:  If any biopsies were taken, your doctors clinic will contact you in 1 to 2   weeks with any results.  - Patient has a contact number available for emergencies.  The signs and   symptoms of potential delayed complications were discussed with the   patient.  Return to normal activities tomorrow.  Written discharge   instructions were provided to the patient.   - Discharge patient to home.   - Resume previous diet.   - Continue present medications.   - Await pathology results.   - Repeat colonoscopy in 3 years for surveillance.   For questions, problems or results please call your physician - Minesh Garzon MD at Work:  [phone].  [facility], EMERGENCY ROOM PHONE NUMBER: [phone]  IF A COMPLICATION OR EMERGENCY SITUATION ARISES AND YOU ARE UNABLE TO REACH   YOUR PHYSICIAN - GO DIRECTLY TO THE EMERGENCY ROOM.  Minesh Garzon MD  9/21/2018 11:36:59 AM  This report has been verified and signed electronically.  [employer]

## 2018-09-21 NOTE — TRANSFER OF CARE
"Anesthesia Transfer of Care Note    Patient: Nilo Hoover    Procedure(s) Performed: Procedure(s) (LRB):  COLONOSCOPY (N/A)    Patient location: GI    Anesthesia Type: general    Transport from OR: Transported from OR on 6-10 L/min O2 by face mask with adequate spontaneous ventilation    Post pain: adequate analgesia    Post assessment: no apparent anesthetic complications and tolerated procedure well    Post vital signs: stable    Level of consciousness: awake, alert and oriented    Nausea/Vomiting: no nausea/vomiting    Complications: none    Transfer of care protocol was followed      Last vitals:   Visit Vitals  /64   Pulse 75   Temp 36.5 °C (97.7 °F)   Resp 18   Ht 5' 8" (1.727 m)   Wt 74.8 kg (165 lb)   SpO2 97%   BMI 25.09 kg/m²     "

## 2018-09-21 NOTE — ANESTHESIA PREPROCEDURE EVALUATION
09/21/2018  Nilo Hoover is a 63 y.o., male.    Anesthesia Evaluation    I have reviewed the Patient Summary Reports.    I have reviewed the Nursing Notes.   I have reviewed the Medications.     Review of Systems  Anesthesia Hx:  No problems with previous Anesthesia Denies Hx of Anesthetic complications  Neg history of prior surgery. Denies Family Hx of Anesthesia complications.   Denies Personal Hx of Anesthesia complications.   Hematology/Oncology:  Hematology Normal   Oncology Normal     EENT/Dental:EENT/Dental Normal   Cardiovascular:   Hypertension    Pulmonary:   COPD    Renal/:  Renal/ Normal     Hepatic/GI:   GERD    Musculoskeletal:   Arthritis     Neurological:   Seizures    Endocrine:  Endocrine Normal    Dermatological:  Skin Normal    Psych:  Psychiatric Normal           Physical Exam  General:  Well nourished    Airway/Jaw/Neck:  Airway Findings: Mouth Opening: Normal Tongue: Normal  General Airway Assessment: Adult, Good  Mallampati: I  Jaw/Neck Findings:  Neck ROM: Normal ROM     Eyes/Ears/Nose:  EYES/EARS/NOSE FINDINGS: Normal   Dental:  Dental Findings: In tact   Chest/Lungs:  Chest/Lungs Clear    Heart/Vascular:  Heart Findings: Normal Heart murmur: negative Vascular Findings: Normal    Abdomen:  Abdomen Findings: Normal    Musculoskeletal:  Musculoskeletal Findings: Normal   Skin:  Skin Findings: Normal    Mental Status:  Mental Status Findings: Normal        Anesthesia Plan  Type of Anesthesia, risks & benefits discussed:  Anesthesia Type:  general  Patient's Preference: general  Intra-op Monitoring Plan: standard ASA monitors  Intra-op Monitoring Plan Comments:   Post Op Pain Control Plan:   Post Op Pain Control Plan Comments:   Induction:   IV  Beta Blocker:  Patient is not currently on a Beta-Blocker (No further documentation required).       Informed Consent: Patient  understands risks and agrees with Anesthesia plan.  Questions answered. Anesthesia consent signed with patient.  ASA Score: 3     Day of Surgery Review of History & Physical: I have interviewed and examined the patient. I have reviewed the patient's H&P dated:  There are no significant changes.  H&P update referred to the provider.         Ready For Surgery From Anesthesia Perspective.

## 2018-09-21 NOTE — ANESTHESIA POSTPROCEDURE EVALUATION
"Anesthesia Post Evaluation    Patient: Nilo Hoover    Procedure(s) Performed: Procedure(s) (LRB):  COLONOSCOPY (N/A)    Final Anesthesia Type: general  Patient location during evaluation: GI PACU  Patient participation: Yes- Able to Participate  Level of consciousness: awake and alert  Post-procedure vital signs: reviewed and stable  Pain management: adequate  Airway patency: patent  PONV status at discharge: No PONV  Anesthetic complications: no      Cardiovascular status: blood pressure returned to baseline  Respiratory status: spontaneous ventilation  Hydration status: euvolemic  Follow-up not needed.        Visit Vitals  BP (!) 147/77   Pulse 63   Temp 36.5 °C (97.7 °F)   Resp 17   Ht 5' 8" (1.727 m)   Wt 74.8 kg (165 lb)   SpO2 96%   BMI 25.09 kg/m²       Pain/Andrew Score: Pain Assessment Performed: Yes (9/21/2018 12:11 PM)  Presence of Pain: denies (9/21/2018 12:11 PM)  Andrew Score: 10 (9/21/2018 12:11 PM)        "

## 2018-09-21 NOTE — ANESTHESIA POSTPROCEDURE EVALUATION
"Anesthesia Post Evaluation    Patient: Nilo Hoover    Procedure(s) Performed: Procedure(s) (LRB):  COLONOSCOPY (N/A)    Final Anesthesia Type: general  Patient location during evaluation: GI PACU  Patient participation: Yes- Able to Participate  Level of consciousness: awake and alert  Post-procedure vital signs: reviewed and stable  Pain management: adequate  Airway patency: patent  PONV status at discharge: No PONV  Anesthetic complications: no      Cardiovascular status: blood pressure returned to baseline  Respiratory status: spontaneous ventilation  Hydration status: euvolemic  Follow-up not needed.        Visit Vitals  /64   Pulse 75   Temp 36.5 °C (97.7 °F)   Resp 18   Ht 5' 8" (1.727 m)   Wt 74.8 kg (165 lb)   SpO2 97%   BMI 25.09 kg/m²       Pain/Andrew Score: Pain Assessment Performed: Yes (9/21/2018 11:42 AM)  Presence of Pain: denies (9/21/2018 11:42 AM)  Andrew Score: 9 (9/21/2018 11:42 AM)        "

## 2018-09-25 ENCOUNTER — TELEPHONE (OUTPATIENT)
Dept: GASTROENTEROLOGY | Facility: CLINIC | Age: 63
End: 2018-09-25

## 2018-09-26 ENCOUNTER — TELEPHONE (OUTPATIENT)
Dept: GASTROENTEROLOGY | Facility: CLINIC | Age: 63
End: 2018-09-26

## 2018-09-26 NOTE — TELEPHONE ENCOUNTER
----- Message from Cristiana Carrasco sent at 9/26/2018 11:12 AM CDT -----  Contact: Self- [phone]  Duran- pt is returning a missed call regarding his biopsy results- please contact pt at [phone]

## 2018-09-28 ENCOUNTER — TELEPHONE (OUTPATIENT)
Dept: ENDOSCOPY | Facility: HOSPITAL | Age: 63
End: 2018-09-28

## 2018-10-05 NOTE — TELEPHONE ENCOUNTER
----- Message from Minesh Garzon MD sent at 9/25/2018  2:17 PM CDT -----  Please notify patient, the colon polyps were benign.    
mA called pt no answer message left on pt vm regarding results  
Normal

## 2018-10-08 ENCOUNTER — TELEPHONE (OUTPATIENT)
Dept: INTERNAL MEDICINE | Facility: CLINIC | Age: 63
End: 2018-10-08

## 2018-10-08 ENCOUNTER — LAB VISIT (OUTPATIENT)
Dept: LAB | Facility: HOSPITAL | Age: 63
End: 2018-10-08
Attending: INTERNAL MEDICINE
Payer: MEDICARE

## 2018-10-08 DIAGNOSIS — D64.9 ANEMIA, UNSPECIFIED TYPE: ICD-10-CM

## 2018-10-08 LAB
BASOPHILS # BLD AUTO: 0.03 K/UL
BASOPHILS NFR BLD: 0.6 %
DIFFERENTIAL METHOD: ABNORMAL
EOSINOPHIL # BLD AUTO: 0.2 K/UL
EOSINOPHIL NFR BLD: 4.5 %
ERYTHROCYTE [DISTWIDTH] IN BLOOD BY AUTOMATED COUNT: 12.6 %
FERRITIN SERPL-MCNC: 245 NG/ML
HCT VFR BLD AUTO: 41.3 %
HGB BLD-MCNC: 14.4 G/DL
IRON SERPL-MCNC: 254 UG/DL
LYMPHOCYTES # BLD AUTO: 1.8 K/UL
LYMPHOCYTES NFR BLD: 36.7 %
MCH RBC QN AUTO: 35.4 PG
MCHC RBC AUTO-ENTMCNC: 34.9 G/DL
MCV RBC AUTO: 102 FL
MONOCYTES # BLD AUTO: 0.6 K/UL
MONOCYTES NFR BLD: 11.5 %
NEUTROPHILS # BLD AUTO: 2.3 K/UL
NEUTROPHILS NFR BLD: 46.5 %
PLATELET # BLD AUTO: 213 K/UL
PMV BLD AUTO: 9.9 FL
RBC # BLD AUTO: 4.07 M/UL
SATURATED IRON: 68 %
TOTAL IRON BINDING CAPACITY: 376 UG/DL
TRANSFERRIN SERPL-MCNC: 254 MG/DL
WBC # BLD AUTO: 4.85 K/UL

## 2018-10-08 PROCEDURE — 36415 COLL VENOUS BLD VENIPUNCTURE: CPT

## 2018-10-08 PROCEDURE — 83540 ASSAY OF IRON: CPT

## 2018-10-08 PROCEDURE — 82728 ASSAY OF FERRITIN: CPT

## 2018-10-08 PROCEDURE — 85025 COMPLETE CBC W/AUTO DIFF WBC: CPT

## 2018-10-08 NOTE — TELEPHONE ENCOUNTER
----- Message from Yasmin Prather MD sent at 10/8/2018  2:42 PM CDT -----  Notify pt his anemia normalized   Iron stores normal he can back off/stop taking iron now

## 2018-11-19 ENCOUNTER — PES CALL (OUTPATIENT)
Dept: ADMINISTRATIVE | Facility: CLINIC | Age: 63
End: 2018-11-19

## 2018-12-04 ENCOUNTER — TELEPHONE (OUTPATIENT)
Dept: INTERNAL MEDICINE | Facility: CLINIC | Age: 63
End: 2018-12-04

## 2018-12-04 NOTE — TELEPHONE ENCOUNTER
----- Message from Katt Saini sent at 12/4/2018 12:27 PM CST -----  Contact: Patient [phone]  Pt states that he is calling to speak with . He states that he has been having a cold and wants to know if something can be sent over to [facility] [id number]  [place] - [address] AT [facility] [address] for him. Please call back and advise.      Thanks

## 2018-12-05 ENCOUNTER — OFFICE VISIT (OUTPATIENT)
Dept: INTERNAL MEDICINE | Facility: CLINIC | Age: 63
End: 2018-12-05
Payer: MEDICARE

## 2018-12-05 VITALS
HEIGHT: 67 IN | DIASTOLIC BLOOD PRESSURE: 86 MMHG | TEMPERATURE: 98 F | HEART RATE: 76 BPM | SYSTOLIC BLOOD PRESSURE: 150 MMHG | BODY MASS INDEX: 26.23 KG/M2 | OXYGEN SATURATION: 96 % | WEIGHT: 167.13 LBS

## 2018-12-05 DIAGNOSIS — J45.909 MODERATE ASTHMA, UNSPECIFIED WHETHER COMPLICATED, UNSPECIFIED WHETHER PERSISTENT: ICD-10-CM

## 2018-12-05 DIAGNOSIS — R09.82 POST-NASAL DRIP: Primary | ICD-10-CM

## 2018-12-05 PROCEDURE — 3008F BODY MASS INDEX DOCD: CPT | Mod: S$GLB,,, | Performed by: NURSE PRACTITIONER

## 2018-12-05 PROCEDURE — 99999 PR PBB SHADOW E&M-EST. PATIENT-LVL IV: CPT | Mod: PBBFAC,,, | Performed by: NURSE PRACTITIONER

## 2018-12-05 PROCEDURE — 3079F DIAST BP 80-89 MM HG: CPT | Mod: S$GLB,,, | Performed by: NURSE PRACTITIONER

## 2018-12-05 PROCEDURE — 99214 OFFICE O/P EST MOD 30 MIN: CPT | Mod: S$GLB,,, | Performed by: NURSE PRACTITIONER

## 2018-12-05 PROCEDURE — 3077F SYST BP >= 140 MM HG: CPT | Mod: S$GLB,,, | Performed by: NURSE PRACTITIONER

## 2018-12-05 PROCEDURE — 1101F PT FALLS ASSESS-DOCD LE1/YR: CPT | Mod: S$GLB,,, | Performed by: NURSE PRACTITIONER

## 2018-12-05 RX ORDER — MONTELUKAST SODIUM 10 MG/1
10 TABLET ORAL DAILY
Qty: 30 TABLET | Refills: 12 | Status: SHIPPED | OUTPATIENT
Start: 2018-12-05 | End: 2019-01-04

## 2018-12-05 RX ORDER — LORATADINE 10 MG/1
10 TABLET ORAL DAILY
Qty: 30 TABLET | Refills: 1 | Status: SHIPPED | OUTPATIENT
Start: 2018-12-05 | End: 2021-06-21

## 2018-12-05 RX ORDER — FLUTICASONE PROPIONATE 50 MCG
1 SPRAY, SUSPENSION (ML) NASAL 2 TIMES DAILY
Qty: 1 G | Refills: 12 | Status: SHIPPED | OUTPATIENT
Start: 2018-12-05 | End: 2019-01-04

## 2018-12-05 NOTE — PATIENT INSTRUCTIONS
Take Claritin/ Allegra or Zyrtec daily to help block histamine release.  You will need to alternate every few months .    Start Singulair ( montelukast) 10 mg once a day.  This medicine blocks leukotriene release which is an inflammatory substance your body produces in response to allergies    Use your inhaler, albuterol 2 puffs every 4-6 hours with the spacer if you are coughing, wheezing or if your chest tight      When using any nasal spray,  shake bottle  before use, place tip in nose, point tip toward your ear, spray the Flonase and then gently sniff.

## 2018-12-05 NOTE — PROGRESS NOTES
Subjective:       Patient ID: Nilo Hoover is a 63 y.o. male.    Chief Complaint: Chills    Disclaimer: This note has been generated using voice-recognition software. There may be typographical errors that have been missed during proof-reading    Pt of Dr Prather here for same-day appointment complaining of postnasal drip, cough.  Patient states symptoms started last Wednesday got worse Saturday and Sunday and 1 actually getting better Monday and Tuesday of this week.  Patient denies any fever.  Patient has been taking Mucinex DM and NyQuil.      Review of Systems   Constitutional: Negative for activity change, appetite change, chills and fever.   HENT: Positive for congestion and postnasal drip. Negative for drooling, rhinorrhea, sinus pressure, sore throat, tinnitus, trouble swallowing and voice change.    Eyes: Negative for discharge.   Respiratory: Positive for cough. Negative for chest tightness and shortness of breath.    Cardiovascular: Negative for chest pain.   Gastrointestinal: Negative for abdominal pain, diarrhea, nausea and vomiting.   Genitourinary: Negative for difficulty urinating.   Musculoskeletal: Negative for arthralgias, myalgias, neck pain and neck stiffness.   Skin: Negative for rash.   Neurological: Negative for dizziness, facial asymmetry and headaches.   Hematological: Negative for adenopathy.   Psychiatric/Behavioral: Negative for sleep disturbance.         Past Medical History:   Diagnosis Date    Bronchitis chronic     COPD (chronic obstructive pulmonary disease)     Degenerative disc disease     lumar spine     Erectile dysfunction     GERD (gastroesophageal reflux disease)     hx gastritis    H/O: GI bleed     HTN (hypertension) 2/26/2013    Personal history of colonic polyps 8/9    Seizures      Past Surgical History:   Procedure Laterality Date    BRONCHOSCOPY, WITH FLUOROSCOPY Right 2/15/2013    Performed by [facility] at [facility] OR [address] [place] [address]    COLONOSCOPY  N/A 9/21/2018    Procedure: COLONOSCOPY;  Surgeon: Minesh Garzon MD;  Location: [facility] (4TH FLR);  Service: Endoscopy;  Laterality: N/A;  3 year f/u-past due- history of colon polyps    COLONOSCOPY N/A 9/21/2018    Performed by Minesh [facility] at [facility] (4TH FLR)    COLONOSCOPY W/ POLYPECTOMY      colonoscpy N/A 10/2/2014    Performed by Sunday Glass MD at [facility] ENDO (4TH FLR)    ESOPHAGOGASTRODUODENOSCOPY      TONSILLECTOMY       Social History     Social History Narrative    Not on file     Family History   Problem Relation Age of Onset    Heart disease Mother     Cancer Mother         colon     COPD Father     Emphysema Father     Seizures Son     Seizures Brother     Hypertension Brother      Outpatient Encounter Medications as of 12/5/2018   Medication Sig Dispense Refill    albuterol (PROAIR HFA) 90 mcg/actuation inhaler Inhale 2 puffs into the lungs every 6 (six) hours as needed for Wheezing. 1 each 6    budesonide-formoterol 160-4.5 mcg (SYMBICORT) 160-4.5 mcg/actuation HFAA Inhale 2 puffs into the lungs every 12 (twelve) hours. 1 Inhaler 12    lisinopril (PRINIVIL,ZESTRIL) 20 MG tablet Take 1 tablet (20 mg total) by mouth once daily. 30 tablet 11    omeprazole 20 mg TbEC Take by mouth. 1 Tablet, Delayed Release (E.C.) Oral Every day      tiotropium (SPIRIVA WITH HANDIHALER) 18 mcg inhalation capsule Inhale 1 capsule (18 mcg total) into the lungs once daily. 30 capsule 6    fluticasone (FLONASE) 50 mcg/actuation nasal spray 1 spray (50 mcg total) by Each Nare route 2 (two) times daily. 1 g 12    loratadine (CLARITIN) 10 mg tablet Take 1 tablet (10 mg total) by mouth once daily. 30 tablet 1    montelukast (SINGULAIR) 10 mg tablet Take 1 tablet (10 mg total) by mouth once daily. 30 tablet 12    tadalafil (CIALIS) 20 MG Tab Take 1 tablet (20 mg total) by mouth as directed. 1 Tablet Oral as directed.  Take as directed 10 tablet 4     No facility-administered encounter  "medications on file as of 12/5/2018.      Last 3 sets of Vitals  Vitals - 1 value per visit 8/16/2018 9/21/2018 12/5/2018   SYSTOLIC 182 147 150   DIASTOLIC 96 77 86   PULSE 74 63 76   TEMPERATURE 96.5 97.7 97.8   RESPIRATIONS 18 17 -   SPO2 96 96 96   Weight (lb) 165.57 165 167.11   Weight (kg) 75.1 74.844 75.8   HEIGHT 5' 8" 5' 8" 5' 7"   BODY MASS INDEX 25.17 25.09 26.17   VISIT REPORT - - -   Pain Score  0 - 0         Objective:      Physical Exam   Constitutional: He is oriented to person, place, and time. He appears well-developed and well-nourished. No distress.   Non toxic appearing male in NAD active and alert     HENT:   Head: Normocephalic and atraumatic.   Right Ear: External ear normal.   Left Ear: External ear normal.   Nose: Mucosal edema and rhinorrhea present. Right sinus exhibits no maxillary sinus tenderness and no frontal sinus tenderness. Left sinus exhibits no maxillary sinus tenderness and no frontal sinus tenderness.   Mouth/Throat: Uvula is midline, oropharynx is clear and moist and mucous membranes are normal.   PND noted     Eyes: Conjunctivae are normal. Pupils are equal, round, and reactive to light. Right eye exhibits no discharge. Left eye exhibits no discharge.   Neck: Normal range of motion. Neck supple.   Cardiovascular: Normal rate, regular rhythm, normal heart sounds and intact distal pulses.   Pulmonary/Chest: Effort normal.   Mildly dim breath sounds throughout     Abdominal: Soft.   Lymphadenopathy:     He has no cervical adenopathy.   Neurological: He is alert and oriented to person, place, and time.   Skin: Skin is warm and dry. Capillary refill takes less than 2 seconds. No rash noted. He is not diaphoretic. No erythema. No pallor.   Psychiatric: He has a normal mood and affect. His behavior is normal. Judgment and thought content normal.   Nursing note and vitals reviewed.          Lab Results   Component Value Date    WBC 4.85 10/08/2018    RBC 4.07 (L) 10/08/2018    HGB " 14.4 10/08/2018    HCT 41.3 10/08/2018     (H) 10/08/2018    MCH 35.4 (H) 10/08/2018    MCHC 34.9 10/08/2018    RDW 12.6 10/08/2018     10/08/2018    MPV 9.9 10/08/2018    GRAN 2.3 10/08/2018    GRAN 46.5 10/08/2018    LYMPH 1.8 10/08/2018    LYMPH 36.7 10/08/2018    MONO 0.6 10/08/2018    MONO 11.5 10/08/2018    EOS 0.2 10/08/2018    BASO 0.03 10/08/2018    EOSINOPHIL 4.5 10/08/2018    BASOPHIL 0.6 10/08/2018     Lab Results   Component Value Date    WBC 4.85 10/08/2018    HGB 14.4 10/08/2018    HCT 41.3 10/08/2018     10/08/2018    CHOL 178 08/20/2018    TRIG 82 08/20/2018    HDL 54 08/20/2018    ALT 21 08/20/2018    AST 24 08/20/2018     08/20/2018    K 4.2 08/20/2018     08/20/2018    CREATININE 1.1 08/20/2018    BUN 12 08/20/2018    CO2 27 08/20/2018    TSH 0.975 08/20/2018    PSA 0.50 08/20/2018    INR 0.9 08/16/2018    HGBA1C 5.4 08/20/2018       Assessment:       1. Post-nasal drip    2. Moderate asthma, unspecified whether complicated, unspecified whether persistent        Plan:           Nilo was seen today for chills.    Diagnoses and all orders for this visit:    Post-nasal drip  -     fluticasone (FLONASE) 50 mcg/actuation nasal spray; 1 spray (50 mcg total) by Each Nare route 2 (two) times daily.  -     loratadine (CLARITIN) 10 mg tablet; Take 1 tablet (10 mg total) by mouth once daily.    Moderate asthma, unspecified whether complicated, unspecified whether persistent  -     montelukast (SINGULAIR) 10 mg tablet; Take 1 tablet (10 mg total) by mouth once daily.  -     loratadine (CLARITIN) 10 mg tablet; Take 1 tablet (10 mg total) by mouth once daily.      Patient Instructions       Take Claritin/ Allegra or Zyrtec daily to help block histamine release.  You will need to alternate every few months .    Start Singulair ( montelukast) 10 mg once a day.  This medicine blocks leukotriene release which is an inflammatory substance your body produces in response to  allergies    Use your inhaler, albuterol 2 puffs every 4-6 hours with the spacer if you are coughing, wheezing or if your chest tight      When using any nasal spray,  shake bottle  before use, place tip in nose, point tip toward your ear, spray the Flonase and then gently sniff.

## 2019-01-18 DIAGNOSIS — I10 ESSENTIAL HYPERTENSION: ICD-10-CM

## 2019-01-18 RX ORDER — LISINOPRIL 20 MG/1
20 TABLET ORAL DAILY
Qty: 90 TABLET | Refills: 3 | Status: SHIPPED | OUTPATIENT
Start: 2019-01-18 | End: 2020-02-11

## 2019-01-31 DIAGNOSIS — J43.2 CENTRILOBULAR EMPHYSEMA: ICD-10-CM

## 2019-01-31 RX ORDER — TIOTROPIUM BROMIDE 18 UG/1
CAPSULE ORAL; RESPIRATORY (INHALATION)
Qty: 30 CAPSULE | Refills: 0 | OUTPATIENT
Start: 2019-01-31

## 2019-02-04 DIAGNOSIS — J43.2 CENTRILOBULAR EMPHYSEMA: ICD-10-CM

## 2019-02-04 RX ORDER — TIOTROPIUM BROMIDE 18 UG/1
18 CAPSULE ORAL; RESPIRATORY (INHALATION) DAILY
Qty: 30 CAPSULE | Refills: 11 | OUTPATIENT
Start: 2019-02-04 | End: 2020-01-16 | Stop reason: SDUPTHER

## 2019-02-05 DIAGNOSIS — J43.2 CENTRILOBULAR EMPHYSEMA: ICD-10-CM

## 2019-02-05 RX ORDER — TIOTROPIUM BROMIDE 18 UG/1
CAPSULE ORAL; RESPIRATORY (INHALATION)
Qty: 30 CAPSULE | Refills: 0 | OUTPATIENT
Start: 2019-02-05

## 2019-03-19 DIAGNOSIS — J43.2 CENTRILOBULAR EMPHYSEMA: ICD-10-CM

## 2019-03-19 RX ORDER — BUDESONIDE AND FORMOTEROL FUMARATE DIHYDRATE 160; 4.5 UG/1; UG/1
AEROSOL RESPIRATORY (INHALATION)
Qty: 10.2 G | Refills: 0 | Status: CANCELLED | OUTPATIENT
Start: 2019-03-19

## 2019-03-22 DIAGNOSIS — J43.2 CENTRILOBULAR EMPHYSEMA: ICD-10-CM

## 2019-03-22 RX ORDER — BUDESONIDE AND FORMOTEROL FUMARATE DIHYDRATE 160; 4.5 UG/1; UG/1
2 AEROSOL RESPIRATORY (INHALATION) EVERY 12 HOURS
Qty: 1 INHALER | Refills: 12 | Status: SHIPPED | OUTPATIENT
Start: 2019-03-22 | End: 2019-09-26 | Stop reason: SDUPTHER

## 2019-03-22 NOTE — TELEPHONE ENCOUNTER
----- Message from Velma Saini sent at 3/22/2019  8:52 AM CDT -----  Contact: self [phone]  ..Rx Refill/Request     Is this a Refill or New Rx: refill   Rx Name and Strength:  budesonide-formoterol 160-4.5 mcg (SYMBICORT) 160-4.5 mcg/actuation [facility]       Preferred Pharmacy with phone number: [facility] [id number] [facility] - [address] [facility] [address]  Communication Preference:phone  Additional Information:

## 2019-07-23 DIAGNOSIS — J43.2 CENTRILOBULAR EMPHYSEMA: ICD-10-CM

## 2019-07-23 RX ORDER — ALBUTEROL SULFATE 90 UG/1
AEROSOL, METERED RESPIRATORY (INHALATION)
Qty: 8.5 G | Refills: 0 | OUTPATIENT
Start: 2019-07-23

## 2019-08-03 ENCOUNTER — TELEPHONE (OUTPATIENT)
Dept: HEPATOLOGY | Facility: CLINIC | Age: 64
End: 2019-08-03

## 2019-08-03 NOTE — TELEPHONE ENCOUNTER
MA called patient schedule his follow up visit to see his liver specialist back in the clinic. He accepted 8/12/19 mailed appt reminder to patient.

## 2019-09-23 DIAGNOSIS — J43.2 CENTRILOBULAR EMPHYSEMA: ICD-10-CM

## 2019-09-23 RX ORDER — BUDESONIDE AND FORMOTEROL FUMARATE DIHYDRATE 160; 4.5 UG/1; UG/1
AEROSOL RESPIRATORY (INHALATION)
Qty: 10.2 G | Refills: 0 | OUTPATIENT
Start: 2019-09-23

## 2019-09-26 DIAGNOSIS — J43.2 CENTRILOBULAR EMPHYSEMA: ICD-10-CM

## 2019-09-26 NOTE — TELEPHONE ENCOUNTER
----- Message from Velma Saini sent at 9/26/2019  4:25 PM CDT -----  Contact: self  [phone]  Pt needs a refill on medication on budesonide-formoterol 160-4.5 mcg (SYMBICORT) 160-4.5 mcg/actuation HFAA  Please send refill into [facility]

## 2019-09-27 RX ORDER — BUDESONIDE AND FORMOTEROL FUMARATE DIHYDRATE 160; 4.5 UG/1; UG/1
2 AEROSOL RESPIRATORY (INHALATION) EVERY 12 HOURS
Qty: 1 INHALER | Refills: 12 | Status: SHIPPED | OUTPATIENT
Start: 2019-09-27 | End: 2020-11-10 | Stop reason: SDUPTHER

## 2020-01-15 DIAGNOSIS — J43.2 CENTRILOBULAR EMPHYSEMA: ICD-10-CM

## 2020-01-15 NOTE — TELEPHONE ENCOUNTER
----- Message from Nichol Marquis MA sent at 1/15/2020  1:28 PM CST -----  Contact: self/[phone]  Refill request. Pt states he been out for 3 days. Needs PA.  Former Pt of Dr. Young  albuterol (PROAIR HFA) 90 mcg/actuation inhaler      .  [id number] [place] [facility] [address] [facility] [place] [id number]  Phone: [phone] Fax: [phone]

## 2020-01-16 DIAGNOSIS — J43.2 CENTRILOBULAR EMPHYSEMA: ICD-10-CM

## 2020-01-16 RX ORDER — TIOTROPIUM BROMIDE 18 UG/1
18 CAPSULE ORAL; RESPIRATORY (INHALATION) DAILY
Qty: 30 CAPSULE | Refills: 11 | Status: SHIPPED | OUTPATIENT
Start: 2020-01-16 | End: 2021-01-05 | Stop reason: SDUPTHER

## 2020-01-16 RX ORDER — ALBUTEROL SULFATE 90 UG/1
2 AEROSOL, METERED RESPIRATORY (INHALATION) EVERY 6 HOURS PRN
Qty: 1 EACH | Refills: 11 | Status: SHIPPED | OUTPATIENT
Start: 2020-01-16 | End: 2021-02-08 | Stop reason: SDUPTHER

## 2020-02-01 DIAGNOSIS — I10 ESSENTIAL HYPERTENSION: ICD-10-CM

## 2020-02-03 RX ORDER — LISINOPRIL 20 MG/1
TABLET ORAL
Qty: 90 TABLET | Refills: 3 | OUTPATIENT
Start: 2020-02-03

## 2020-02-11 DIAGNOSIS — I10 ESSENTIAL HYPERTENSION: ICD-10-CM

## 2020-02-11 RX ORDER — LISINOPRIL 20 MG/1
TABLET ORAL
Qty: 90 TABLET | Refills: 0 | Status: SHIPPED | OUTPATIENT
Start: 2020-02-11 | End: 2020-05-08

## 2020-05-08 DIAGNOSIS — I10 ESSENTIAL HYPERTENSION: ICD-10-CM

## 2020-05-08 RX ORDER — LISINOPRIL 20 MG/1
TABLET ORAL
Qty: 90 TABLET | Refills: 0 | Status: SHIPPED | OUTPATIENT
Start: 2020-05-08 | End: 2020-05-11 | Stop reason: SDUPTHER

## 2020-05-11 DIAGNOSIS — I10 ESSENTIAL HYPERTENSION: ICD-10-CM

## 2020-05-11 RX ORDER — LISINOPRIL 20 MG/1
TABLET ORAL
Qty: 90 TABLET | Refills: 0 | Status: SHIPPED | OUTPATIENT
Start: 2020-05-11 | End: 2020-05-20 | Stop reason: SDUPTHER

## 2020-05-20 DIAGNOSIS — I10 ESSENTIAL HYPERTENSION: ICD-10-CM

## 2020-05-20 RX ORDER — LISINOPRIL 20 MG/1
TABLET ORAL
Qty: 90 TABLET | Refills: 0 | Status: SHIPPED | OUTPATIENT
Start: 2020-05-20 | End: 2020-06-08 | Stop reason: SDUPTHER

## 2020-06-08 DIAGNOSIS — I10 ESSENTIAL HYPERTENSION: ICD-10-CM

## 2020-06-08 RX ORDER — LISINOPRIL 20 MG/1
TABLET ORAL
Qty: 90 TABLET | Refills: 0 | Status: SHIPPED | OUTPATIENT
Start: 2020-06-08 | End: 2021-05-02

## 2020-10-08 ENCOUNTER — OFFICE VISIT (OUTPATIENT)
Dept: INTERNAL MEDICINE | Facility: CLINIC | Age: 65
End: 2020-10-08
Payer: MEDICARE

## 2020-10-08 ENCOUNTER — LAB VISIT (OUTPATIENT)
Dept: LAB | Facility: HOSPITAL | Age: 65
End: 2020-10-08
Attending: INTERNAL MEDICINE
Payer: COMMERCIAL

## 2020-10-08 VITALS
BODY MASS INDEX: 24.96 KG/M2 | DIASTOLIC BLOOD PRESSURE: 80 MMHG | SYSTOLIC BLOOD PRESSURE: 126 MMHG | WEIGHT: 164.69 LBS | OXYGEN SATURATION: 98 % | HEART RATE: 98 BPM | HEIGHT: 68 IN

## 2020-10-08 DIAGNOSIS — F10.10 EXCESSIVE DRINKING ALCOHOL: ICD-10-CM

## 2020-10-08 DIAGNOSIS — E66.3 OVERWEIGHT (BMI 25.0-29.9): ICD-10-CM

## 2020-10-08 DIAGNOSIS — R10.32 LLQ ABDOMINAL PAIN: Primary | ICD-10-CM

## 2020-10-08 DIAGNOSIS — K76.0 FATTY LIVER: ICD-10-CM

## 2020-10-08 DIAGNOSIS — R10.32 LLQ ABDOMINAL PAIN: ICD-10-CM

## 2020-10-08 LAB
ALBUMIN SERPL BCP-MCNC: 3.8 G/DL (ref 3.5–5.2)
ALP SERPL-CCNC: 96 U/L (ref 55–135)
ALT SERPL W/O P-5'-P-CCNC: 74 U/L (ref 10–44)
AMYLASE SERPL-CCNC: 49 U/L (ref 20–110)
ANION GAP SERPL CALC-SCNC: 13 MMOL/L (ref 8–16)
AST SERPL-CCNC: 144 U/L (ref 10–40)
BASOPHILS # BLD AUTO: 0.04 K/UL (ref 0–0.2)
BASOPHILS NFR BLD: 0.7 % (ref 0–1.9)
BILIRUB SERPL-MCNC: 1.5 MG/DL (ref 0.1–1)
BUN SERPL-MCNC: 7 MG/DL (ref 8–23)
CALCIUM SERPL-MCNC: 8.8 MG/DL (ref 8.7–10.5)
CHLORIDE SERPL-SCNC: 96 MMOL/L (ref 95–110)
CO2 SERPL-SCNC: 25 MMOL/L (ref 23–29)
CREAT SERPL-MCNC: 1.1 MG/DL (ref 0.5–1.4)
DIFFERENTIAL METHOD: ABNORMAL
EOSINOPHIL # BLD AUTO: 0.1 K/UL (ref 0–0.5)
EOSINOPHIL NFR BLD: 1.1 % (ref 0–8)
ERYTHROCYTE [DISTWIDTH] IN BLOOD BY AUTOMATED COUNT: 13.6 % (ref 11.5–14.5)
EST. GFR  (AFRICAN AMERICAN): >60 ML/MIN/1.73 M^2
EST. GFR  (NON AFRICAN AMERICAN): >60 ML/MIN/1.73 M^2
GLUCOSE SERPL-MCNC: 128 MG/DL (ref 70–110)
HCT VFR BLD AUTO: 39.3 % (ref 40–54)
HGB BLD-MCNC: 13.4 G/DL (ref 14–18)
IMM GRANULOCYTES # BLD AUTO: 0.01 K/UL (ref 0–0.04)
IMM GRANULOCYTES NFR BLD AUTO: 0.2 % (ref 0–0.5)
LIPASE SERPL-CCNC: 27 U/L (ref 4–60)
LYMPHOCYTES # BLD AUTO: 1.5 K/UL (ref 1–4.8)
LYMPHOCYTES NFR BLD: 27.7 % (ref 18–48)
MCH RBC QN AUTO: 36.2 PG (ref 27–31)
MCHC RBC AUTO-ENTMCNC: 34.1 G/DL (ref 32–36)
MCV RBC AUTO: 106 FL (ref 82–98)
MONOCYTES # BLD AUTO: 0.7 K/UL (ref 0.3–1)
MONOCYTES NFR BLD: 13.2 % (ref 4–15)
NEUTROPHILS # BLD AUTO: 3.2 K/UL (ref 1.8–7.7)
NEUTROPHILS NFR BLD: 57.1 % (ref 38–73)
NRBC BLD-RTO: 0 /100 WBC
PLATELET # BLD AUTO: 204 K/UL (ref 150–350)
PMV BLD AUTO: 9.6 FL (ref 9.2–12.9)
POTASSIUM SERPL-SCNC: 4.3 MMOL/L (ref 3.5–5.1)
PROT SERPL-MCNC: 7.2 G/DL (ref 6–8.4)
RBC # BLD AUTO: 3.7 M/UL (ref 4.6–6.2)
SODIUM SERPL-SCNC: 134 MMOL/L (ref 136–145)
WBC # BLD AUTO: 5.52 K/UL (ref 3.9–12.7)

## 2020-10-08 PROCEDURE — 82150 ASSAY OF AMYLASE: CPT

## 2020-10-08 PROCEDURE — 1125F PR PAIN SEVERITY QUANTIFIED, PAIN PRESENT: ICD-10-PCS | Mod: S$GLB,,, | Performed by: NURSE PRACTITIONER

## 2020-10-08 PROCEDURE — 3008F BODY MASS INDEX DOCD: CPT | Mod: CPTII,S$GLB,, | Performed by: NURSE PRACTITIONER

## 2020-10-08 PROCEDURE — 1159F PR MEDICATION LIST DOCUMENTED IN MEDICAL RECORD: ICD-10-PCS | Mod: S$GLB,,, | Performed by: NURSE PRACTITIONER

## 2020-10-08 PROCEDURE — 1101F PR PT FALLS ASSESS DOC 0-1 FALLS W/OUT INJ PAST YR: ICD-10-PCS | Mod: CPTII,S$GLB,, | Performed by: NURSE PRACTITIONER

## 2020-10-08 PROCEDURE — 1125F AMNT PAIN NOTED PAIN PRSNT: CPT | Mod: S$GLB,,, | Performed by: NURSE PRACTITIONER

## 2020-10-08 PROCEDURE — 36415 COLL VENOUS BLD VENIPUNCTURE: CPT

## 2020-10-08 PROCEDURE — 99999 PR PBB SHADOW E&M-EST. PATIENT-LVL IV: CPT | Mod: PBBFAC,,, | Performed by: NURSE PRACTITIONER

## 2020-10-08 PROCEDURE — 3079F DIAST BP 80-89 MM HG: CPT | Mod: CPTII,S$GLB,, | Performed by: NURSE PRACTITIONER

## 2020-10-08 PROCEDURE — 85025 COMPLETE CBC W/AUTO DIFF WBC: CPT

## 2020-10-08 PROCEDURE — 3074F PR MOST RECENT SYSTOLIC BLOOD PRESSURE < 130 MM HG: ICD-10-PCS | Mod: CPTII,S$GLB,, | Performed by: NURSE PRACTITIONER

## 2020-10-08 PROCEDURE — 3079F PR MOST RECENT DIASTOLIC BLOOD PRESSURE 80-89 MM HG: ICD-10-PCS | Mod: CPTII,S$GLB,, | Performed by: NURSE PRACTITIONER

## 2020-10-08 PROCEDURE — 99214 PR OFFICE/OUTPT VISIT, EST, LEVL IV, 30-39 MIN: ICD-10-PCS | Mod: S$GLB,,, | Performed by: NURSE PRACTITIONER

## 2020-10-08 PROCEDURE — 1159F MED LIST DOCD IN RCRD: CPT | Mod: S$GLB,,, | Performed by: NURSE PRACTITIONER

## 2020-10-08 PROCEDURE — 99999 PR PBB SHADOW E&M-EST. PATIENT-LVL IV: ICD-10-PCS | Mod: PBBFAC,,, | Performed by: NURSE PRACTITIONER

## 2020-10-08 PROCEDURE — 99214 OFFICE O/P EST MOD 30 MIN: CPT | Mod: S$GLB,,, | Performed by: NURSE PRACTITIONER

## 2020-10-08 PROCEDURE — 1101F PT FALLS ASSESS-DOCD LE1/YR: CPT | Mod: CPTII,S$GLB,, | Performed by: NURSE PRACTITIONER

## 2020-10-08 PROCEDURE — 80053 COMPREHEN METABOLIC PANEL: CPT

## 2020-10-08 PROCEDURE — 83690 ASSAY OF LIPASE: CPT

## 2020-10-08 PROCEDURE — 3074F SYST BP LT 130 MM HG: CPT | Mod: CPTII,S$GLB,, | Performed by: NURSE PRACTITIONER

## 2020-10-08 PROCEDURE — 3008F PR BODY MASS INDEX (BMI) DOCUMENTED: ICD-10-PCS | Mod: CPTII,S$GLB,, | Performed by: NURSE PRACTITIONER

## 2020-10-08 NOTE — PATIENT INSTRUCTIONS
Check labs today    Decrease alcohol intake as you already have a history of fatty liver relative to alcohol use    Hydrate, clear liquids, increase water intake, limit caffeine and alcohol    Will call with results      Clear Liquid Diet    Clear liquids are any liquid that you can see through. They are also very easy to digest. You may be put on a clear liquid diet if you are recovering from irritation or infection of the stomach or intestinal tract. This diet may also be used before surgery or special procedures such as a colonoscopy. You should not be on this diet for more than 3 days. Below are some clear liquids you can have on this diet.  Adults and children over 2 years old  Adults should drink a total of 2 to 3 quarts of liquid per day. It may be easier to drink small frequent servings rather than a few large ones. Liquids can include:  · Fruit juices. Strained orange juice or lemonade (no pulp); apple, grape, and cranberry juice; clear fruit drinks  · Beverages. Sport drinks, sodas, mineral water (plain or flavored), tea, black coffee, liquid gelatin (add twice the recommended amount of water)  · Soups. Clear broth  · Desserts. Plain gelatin, popsicles, fruit juice bars  Children under 2 years old  Oral rehydration fluids are available at drug stores and most grocery stores. You dont need a prescription.  Date Last Reviewed: 8/1/2016  © 9835-8557 [employer]. [address] [place] [address], [place] [address]. All rights reserved. This information is not intended as a substitute for professional medical care. Always follow your healthcare professional's instructions.

## 2020-10-08 NOTE — PROGRESS NOTES
Subjective:       Patient ID: Nilo Hoover is a 65 y.o. male.    Chief Complaint: Abdominal Pain    Pt of Dr. Prather, here for complaint of stomach pain. Lower abd pain started yesterday. Pt reports drinking whiskey and diet coke every evening. Pt has not eaten since Tuesday night. Pt became hot and dehydrated while on a fishing trip yesterday. Felt dizzy, one episode of vomiting, and had to sit down. Pt reports feeling much better today. Pt endorses drinking a lot of water and juices, but did not drink anything while on fishing trip yesterday.    Abdominal Pain  This is a new problem. The current episode started yesterday. The onset quality is sudden. The problem occurs intermittently. The most recent episode lasted 1 hour. The problem has been rapidly improving. The pain is located in the LLQ. The pain is at a severity of 2/10. The pain is mild. Quality: tightness. The abdominal pain does not radiate. Associated symptoms include vomiting. Pertinent negatives include no constipation, diarrhea, dysuria, fever, headaches, hematochezia, hematuria, melena or nausea. Associated symptoms comments: 1 episode yesterday. The pain is aggravated by drinking alcohol. The pain is relieved by being still and passing flatus. He has tried proton pump inhibitors for the symptoms. The treatment provided mild relief. His past medical history is significant for GERD. There is no history of abdominal surgery, colon cancer, Crohn's disease, gallstones, irritable bowel syndrome, pancreatitis, PUD or ulcerative colitis. Patient's medical history does not include kidney stones and UTI.     Review of Systems   Constitutional: Negative for activity change, appetite change, chills, diaphoresis, fatigue and fever.   Respiratory: Negative for chest tightness and shortness of breath.    Cardiovascular: Negative for chest pain.   Gastrointestinal: Positive for abdominal pain and vomiting. Negative for change in bowel habit, constipation,  diarrhea, hematochezia, melena, nausea and change in bowel habit.        Last BM 10/08/20   Genitourinary: Negative for dysuria and hematuria.   Allergic/Immunologic: Negative for environmental allergies, food allergies and frequent infections.   Neurological: Negative for dizziness, weakness, light-headedness, numbness and headaches.   Hematological: Negative for adenopathy. Does not bruise/bleed easily.   Psychiatric/Behavioral: Negative for confusion and suicidal ideas.     Review of patient's allergies indicates:  No Known Allergies    Current Outpatient Medications:     albuterol (PROAIR HFA) 90 mcg/actuation inhaler, Inhale 2 puffs into the lungs every 6 (six) hours as needed for Wheezing or Shortness of Breath., Disp: 1 each, Rfl: 11    budesonide-formoterol 160-4.5 mcg (SYMBICORT) 160-4.5 mcg/actuation HFAA, Inhale 2 puffs into the lungs every 12 (twelve) hours., Disp: 1 Inhaler, Rfl: 12    lisinopriL (PRINIVIL,ZESTRIL) 20 MG tablet, TAKE 1 TABLET(20 MG) BY MOUTH EVERY DAY, Disp: 90 tablet, Rfl: 0    omeprazole 20 mg TbEC, Take by mouth. 1 Tablet, Delayed Release (E.C.) Oral Every day, Disp: , Rfl:     tadalafil (CIALIS) 20 MG Tab, Take 1 tablet (20 mg total) by mouth as directed. 1 Tablet Oral as directed.  Take as directed, Disp: 10 tablet, Rfl: 4    loratadine (CLARITIN) 10 mg tablet, Take 1 tablet (10 mg total) by mouth once daily., Disp: 30 tablet, Rfl: 1    tiotropium (SPIRIVA WITH HANDIHALER) 18 mcg inhalation capsule, Inhale 1 capsule (18 mcg total) into the lungs once daily., Disp: 30 capsule, Rfl: 11    Patient Active Problem List   Diagnosis    Personal history of colonic polyps    COPD (chronic obstructive pulmonary disease)    Abnormal chest x-ray    Seizures    GERD (gastroesophageal reflux disease)    HTN (hypertension)    Screening for colon cancer    Elevated liver enzymes    Fatty liver    Cough    Alcohol abuse    History of colon polyps     Past Medical History:    Diagnosis Date    Bronchitis chronic     COPD (chronic obstructive pulmonary disease)     Degenerative disc disease     lumar spine     Erectile dysfunction     GERD (gastroesophageal reflux disease)     hx gastritis    H/O: GI bleed     HTN (hypertension) 2013    Personal history of colonic polyps       Past Surgical History:   Procedure Laterality Date    COLONOSCOPY N/A 2018    Procedure: COLONOSCOPY;  Surgeon: Minesh Garzon MD;  Location: [address];  Service: Endoscopy;  Laterality: N/A;  3 year f/u-past due- history of colon polyps    COLONOSCOPY W/ POLYPECTOMY      ESOPHAGOGASTRODUODENOSCOPY      TONSILLECTOMY       Social History     Socioeconomic History    Marital status: Single     Spouse name: Not on file    Number of children: Not on file    Years of education: Not on file    Highest education level: Not on file   Occupational History     Employer: [employer]   Social Needs    Financial resource strain: Not on file    Food insecurity     Worry: Not on file     Inability: Not on file    Transportation needs     Medical: Not on file     Non-medical: Not on file   Tobacco Use    Smoking status: Former Smoker     Packs/day: 1.50     Years: 35.00     Pack years: 52.50     Types: Cigarettes     Quit date: 2009     Years since quittin.6    Smokeless tobacco: Never Used   Substance and Sexual Activity    Alcohol use: Yes     Alcohol/week: 1.7 standard drinks     Types: 2 Standard drinks or equivalent per week     Comment: every other day    Drug use: No    Sexual activity: Not on file   Lifestyle    Physical activity     Days per week: Not on file     Minutes per session: Not on file    Stress: Not on file   Relationships    Social connections     Talks on phone: Not on file     Gets together: Not on file     Attends Zoroastrianism service: Not on file     Active member of club or organization: Not on file     Attends  "meetings of clubs or organizations: Not on file     Relationship status: Not on file   Other Topics Concern    Not on file   Social History Narrative    Not on file     Family History   Problem Relation Age of Onset    Heart disease Mother     Cancer Mother         colon     COPD Father     Emphysema Father     Seizures Son     Seizures Brother     Hypertension Brother            Objective:       Vitals:    10/08/20 1043   BP: 126/80   Pulse: 98   SpO2: 98%   Weight: 74.7 kg (164 lb 10.9 oz)   Height: 5' 8" (1.727 m)   PainSc:   2     Body mass index is 25.04 kg/m².    Physical Exam  Constitutional:       Comments: overweight   HENT:      Head: Normocephalic.      Right Ear: Tympanic membrane, ear canal and external ear normal.      Left Ear: Tympanic membrane, ear canal and external ear normal.   Eyes:      Extraocular Movements: Extraocular movements intact.      Conjunctiva/sclera: Conjunctivae normal.      Pupils: Pupils are equal, round, and reactive to light.   Cardiovascular:      Rate and Rhythm: Normal rate and regular rhythm.      Pulses: Normal pulses.      Heart sounds: Normal heart sounds.   Pulmonary:      Effort: Pulmonary effort is normal.      Breath sounds: Normal breath sounds.   Abdominal:      General: Bowel sounds are normal. There is no distension.      Palpations: Abdomen is soft. There is no mass.      Tenderness: There is abdominal tenderness in the left lower quadrant. There is no right CVA tenderness, left CVA tenderness, guarding or rebound. Negative signs include Cedeno's sign.      Hernia: No hernia is present.   Musculoskeletal:      Right lower leg: No edema.      Left lower leg: No edema.   Skin:     Capillary Refill: Capillary refill takes less than 2 seconds.      Findings: Bruising, erythema and rash present. Rash is macular.   Neurological:      General: No focal deficit present.      Mental Status: He is alert and oriented to person, place, and time.   Psychiatric:    "      Mood and Affect: Mood normal.         Behavior: Behavior normal.         Thought Content: Thought content normal.         Judgment: Judgment normal.         Assessment:       1. LLQ abdominal pain    2. Fatty liver    3. Excessive drinking alcohol    4. BMI 25.0-25.9,adult    5. Overweight (BMI 25.0-29.9)        Plan:       Nilo was seen today for abdominal pain.    Diagnoses and all orders for this visit:    LLQ abdominal pain  -     CBC auto differential; Future  -     Comprehensive Metabolic Panel; Future  -     Amylase; Future  -     Lipase; Future    Fatty liver  -     CBC auto differential; Future  -     Comprehensive Metabolic Panel; Future  -     Amylase; Future  -     Lipase; Future    Excessive drinking alcohol  -     Comprehensive Metabolic Panel; Future    BMI 25.0-25.9,adult  BMI reivewed    Overweight (BMI 25.0-29.9)  BMI reviewed.    Diet and exercise to lose weight.    Check labs today    Decrease alcohol intake as you already have a history of fatty liver relative to alcohol use    Hydrate, clear liquids, increase water intake, limit caffeine and alcohol    Self care instructions provided in AVS    Will call with results    Follow up if symptoms worsen or fail to improve.

## 2020-10-10 NOTE — PROGRESS NOTES
Liver enzymes are elevated which is secondary to his alcohol intake as we discuss during his visit. Cut back on alcohol intake and hydrate with water. Follow up in 2 weeks with his PCP to recheck.

## 2020-10-12 ENCOUNTER — TELEPHONE (OUTPATIENT)
Dept: INTERNAL MEDICINE | Facility: CLINIC | Age: 65
End: 2020-10-12

## 2020-10-12 NOTE — TELEPHONE ENCOUNTER
----- Message from Sara Sosa DNP sent at 10/9/2020 10:23 PM CDT -----  Liver enzymes are elevated which is secondary to his alcohol intake as we discuss during his visit. Cut back on alcohol intake and hydrate with water. Follow up in 2 weeks with his PCP to recheck.

## 2020-11-10 DIAGNOSIS — J43.2 CENTRILOBULAR EMPHYSEMA: ICD-10-CM

## 2020-11-10 RX ORDER — BUDESONIDE AND FORMOTEROL FUMARATE DIHYDRATE 160; 4.5 UG/1; UG/1
2 AEROSOL RESPIRATORY (INHALATION) EVERY 12 HOURS
Qty: 1 INHALER | Refills: 12 | Status: SHIPPED | OUTPATIENT
Start: 2020-11-10 | End: 2021-12-01

## 2021-01-05 DIAGNOSIS — J43.2 CENTRILOBULAR EMPHYSEMA: ICD-10-CM

## 2021-01-05 RX ORDER — TIOTROPIUM BROMIDE 18 UG/1
18 CAPSULE ORAL; RESPIRATORY (INHALATION) DAILY
Qty: 30 CAPSULE | Refills: 11 | Status: SHIPPED | OUTPATIENT
Start: 2021-01-05 | End: 2022-02-01

## 2021-02-08 DIAGNOSIS — J43.2 CENTRILOBULAR EMPHYSEMA: ICD-10-CM

## 2021-02-09 RX ORDER — ALBUTEROL SULFATE 90 UG/1
2 AEROSOL, METERED RESPIRATORY (INHALATION) EVERY 6 HOURS PRN
Qty: 8 G | Refills: 11 | Status: SHIPPED | OUTPATIENT
Start: 2021-02-09 | End: 2022-08-23

## 2021-03-05 ENCOUNTER — IMMUNIZATION (OUTPATIENT)
Dept: PRIMARY CARE CLINIC | Facility: CLINIC | Age: 66
End: 2021-03-05
Payer: COMMERCIAL

## 2021-03-05 DIAGNOSIS — Z23 NEED FOR VACCINATION: Primary | ICD-10-CM

## 2021-03-05 PROCEDURE — 0031A PR IMMUNIZ ADMIN, SARS-COV-2 COVID-19 VACC, 5X10VP/0.5ML: CPT | Mod: CV19,S$GLB,, | Performed by: INTERNAL MEDICINE

## 2021-03-05 PROCEDURE — 91303 PR SARSCOV2 VAC AD26 .5ML IM: ICD-10-PCS | Mod: S$GLB,,, | Performed by: INTERNAL MEDICINE

## 2021-03-05 PROCEDURE — 91303 PR SARSCOV2 VAC AD26 .5ML IM: CPT | Mod: S$GLB,,, | Performed by: INTERNAL MEDICINE

## 2021-03-05 PROCEDURE — 0031A PR IMMUNIZ ADMIN, SARS-COV-2 COVID-19 VACC, 5X10VP/0.5ML: ICD-10-PCS | Mod: CV19,S$GLB,, | Performed by: INTERNAL MEDICINE

## 2021-05-12 ENCOUNTER — TELEPHONE (OUTPATIENT)
Dept: INTERNAL MEDICINE | Facility: CLINIC | Age: 66
End: 2021-05-12

## 2021-05-12 DIAGNOSIS — Z00.00 ANNUAL PHYSICAL EXAM: Primary | ICD-10-CM

## 2021-05-12 DIAGNOSIS — Z12.5 PROSTATE CANCER SCREENING: Primary | ICD-10-CM

## 2021-05-12 DIAGNOSIS — Z00.00 ANNUAL PHYSICAL EXAM: ICD-10-CM

## 2021-05-12 DIAGNOSIS — I10 ESSENTIAL HYPERTENSION: ICD-10-CM

## 2021-05-12 RX ORDER — LISINOPRIL 20 MG/1
TABLET ORAL
Qty: 90 TABLET | Refills: 0 | Status: SHIPPED | OUTPATIENT
Start: 2021-05-12 | End: 2021-05-12

## 2021-05-12 RX ORDER — LISINOPRIL 20 MG/1
TABLET ORAL
Qty: 90 TABLET | Refills: 0 | Status: SHIPPED | OUTPATIENT
Start: 2021-05-12 | End: 2021-08-06 | Stop reason: SDUPTHER

## 2021-05-12 RX ORDER — LISINOPRIL 20 MG/1
TABLET ORAL
Qty: 90 TABLET | Refills: 0 | Status: SHIPPED | OUTPATIENT
Start: 2021-05-12 | End: 2021-05-12 | Stop reason: SDUPTHER

## 2021-06-07 ENCOUNTER — TELEPHONE (OUTPATIENT)
Dept: INTERNAL MEDICINE | Facility: CLINIC | Age: 66
End: 2021-06-07

## 2021-06-14 ENCOUNTER — LAB VISIT (OUTPATIENT)
Dept: LAB | Facility: HOSPITAL | Age: 66
End: 2021-06-14
Payer: COMMERCIAL

## 2021-06-14 DIAGNOSIS — Z00.00 ANNUAL PHYSICAL EXAM: ICD-10-CM

## 2021-06-14 DIAGNOSIS — Z12.5 PROSTATE CANCER SCREENING: ICD-10-CM

## 2021-06-14 LAB
ALBUMIN SERPL BCP-MCNC: 4.2 G/DL (ref 3.5–5.2)
ALP SERPL-CCNC: 60 U/L (ref 55–135)
ALT SERPL W/O P-5'-P-CCNC: 17 U/L (ref 10–44)
ANION GAP SERPL CALC-SCNC: 13 MMOL/L (ref 8–16)
AST SERPL-CCNC: 27 U/L (ref 10–40)
BASOPHILS # BLD AUTO: 0.05 K/UL (ref 0–0.2)
BASOPHILS NFR BLD: 0.7 % (ref 0–1.9)
BILIRUB DIRECT SERPL-MCNC: 0.2 MG/DL (ref 0.1–0.3)
BILIRUB SERPL-MCNC: 0.6 MG/DL (ref 0.1–1)
BUN SERPL-MCNC: 18 MG/DL (ref 8–23)
CALCIUM SERPL-MCNC: 10.8 MG/DL (ref 8.7–10.5)
CHLORIDE SERPL-SCNC: 99 MMOL/L (ref 95–110)
CHOLEST SERPL-MCNC: 196 MG/DL (ref 120–199)
CHOLEST/HDLC SERPL: 3.6 {RATIO} (ref 2–5)
CO2 SERPL-SCNC: 25 MMOL/L (ref 23–29)
COMPLEXED PSA SERPL-MCNC: 0.65 NG/ML (ref 0–4)
CREAT SERPL-MCNC: 1.1 MG/DL (ref 0.5–1.4)
DIFFERENTIAL METHOD: ABNORMAL
EOSINOPHIL # BLD AUTO: 0.4 K/UL (ref 0–0.5)
EOSINOPHIL NFR BLD: 5.2 % (ref 0–8)
ERYTHROCYTE [DISTWIDTH] IN BLOOD BY AUTOMATED COUNT: 12.4 % (ref 11.5–14.5)
EST. GFR  (AFRICAN AMERICAN): >60 ML/MIN/1.73 M^2
EST. GFR  (NON AFRICAN AMERICAN): >60 ML/MIN/1.73 M^2
ESTIMATED AVG GLUCOSE: 103 MG/DL (ref 68–131)
GLUCOSE SERPL-MCNC: 100 MG/DL (ref 70–110)
HBA1C MFR BLD: 5.2 % (ref 4–5.6)
HCT VFR BLD AUTO: 45.9 % (ref 40–54)
HDLC SERPL-MCNC: 54 MG/DL (ref 40–75)
HDLC SERPL: 27.6 % (ref 20–50)
HGB BLD-MCNC: 15.3 G/DL (ref 14–18)
IMM GRANULOCYTES # BLD AUTO: 0.01 K/UL (ref 0–0.04)
IMM GRANULOCYTES NFR BLD AUTO: 0.1 % (ref 0–0.5)
LDLC SERPL CALC-MCNC: 126.6 MG/DL (ref 63–159)
LYMPHOCYTES # BLD AUTO: 2.6 K/UL (ref 1–4.8)
LYMPHOCYTES NFR BLD: 33.9 % (ref 18–48)
MCH RBC QN AUTO: 33.6 PG (ref 27–31)
MCHC RBC AUTO-ENTMCNC: 33.3 G/DL (ref 32–36)
MCV RBC AUTO: 101 FL (ref 82–98)
MONOCYTES # BLD AUTO: 0.7 K/UL (ref 0.3–1)
MONOCYTES NFR BLD: 9.7 % (ref 4–15)
NEUTROPHILS # BLD AUTO: 3.8 K/UL (ref 1.8–7.7)
NEUTROPHILS NFR BLD: 50.4 % (ref 38–73)
NONHDLC SERPL-MCNC: 142 MG/DL
NRBC BLD-RTO: 0 /100 WBC
PLATELET # BLD AUTO: 287 K/UL (ref 150–450)
PMV BLD AUTO: 10.3 FL (ref 9.2–12.9)
POTASSIUM SERPL-SCNC: 4.6 MMOL/L (ref 3.5–5.1)
PROT SERPL-MCNC: 7.9 G/DL (ref 6–8.4)
RBC # BLD AUTO: 4.55 M/UL (ref 4.6–6.2)
SODIUM SERPL-SCNC: 137 MMOL/L (ref 136–145)
TRIGL SERPL-MCNC: 77 MG/DL (ref 30–150)
TSH SERPL DL<=0.005 MIU/L-ACNC: 2.13 UIU/ML (ref 0.4–4)
WBC # BLD AUTO: 7.63 K/UL (ref 3.9–12.7)

## 2021-06-14 PROCEDURE — 84443 ASSAY THYROID STIM HORMONE: CPT | Performed by: INTERNAL MEDICINE

## 2021-06-14 PROCEDURE — 80076 HEPATIC FUNCTION PANEL: CPT | Performed by: INTERNAL MEDICINE

## 2021-06-14 PROCEDURE — 83036 HEMOGLOBIN GLYCOSYLATED A1C: CPT | Performed by: INTERNAL MEDICINE

## 2021-06-14 PROCEDURE — 80061 LIPID PANEL: CPT | Performed by: INTERNAL MEDICINE

## 2021-06-14 PROCEDURE — 85025 COMPLETE CBC W/AUTO DIFF WBC: CPT | Performed by: INTERNAL MEDICINE

## 2021-06-14 PROCEDURE — 84153 ASSAY OF PSA TOTAL: CPT | Performed by: INTERNAL MEDICINE

## 2021-06-14 PROCEDURE — 80048 BASIC METABOLIC PNL TOTAL CA: CPT | Performed by: INTERNAL MEDICINE

## 2021-06-14 PROCEDURE — 36415 COLL VENOUS BLD VENIPUNCTURE: CPT | Performed by: INTERNAL MEDICINE

## 2021-06-21 ENCOUNTER — OFFICE VISIT (OUTPATIENT)
Dept: INTERNAL MEDICINE | Facility: CLINIC | Age: 66
End: 2021-06-21
Payer: MEDICARE

## 2021-06-21 ENCOUNTER — TELEPHONE (OUTPATIENT)
Dept: INTERNAL MEDICINE | Facility: CLINIC | Age: 66
End: 2021-06-21

## 2021-06-21 VITALS
OXYGEN SATURATION: 99 % | HEART RATE: 70 BPM | BODY MASS INDEX: 25.36 KG/M2 | DIASTOLIC BLOOD PRESSURE: 80 MMHG | WEIGHT: 167.31 LBS | HEIGHT: 68 IN | SYSTOLIC BLOOD PRESSURE: 128 MMHG

## 2021-06-21 DIAGNOSIS — I10 ESSENTIAL HYPERTENSION: ICD-10-CM

## 2021-06-21 DIAGNOSIS — Z12.11 COLON CANCER SCREENING: ICD-10-CM

## 2021-06-21 DIAGNOSIS — K76.0 FATTY LIVER: ICD-10-CM

## 2021-06-21 DIAGNOSIS — Z00.00 ANNUAL PHYSICAL EXAM: Primary | ICD-10-CM

## 2021-06-21 DIAGNOSIS — J43.2 CENTRILOBULAR EMPHYSEMA: ICD-10-CM

## 2021-06-21 DIAGNOSIS — Z86.010 HX OF COLONIC POLYPS: ICD-10-CM

## 2021-06-21 DIAGNOSIS — J44.9 CHRONIC OBSTRUCTIVE PULMONARY DISEASE, UNSPECIFIED COPD TYPE: ICD-10-CM

## 2021-06-21 DIAGNOSIS — F10.10 ALCOHOL ABUSE: ICD-10-CM

## 2021-06-21 DIAGNOSIS — R35.0 URINARY FREQUENCY: ICD-10-CM

## 2021-06-21 PROCEDURE — 99397 PR PREVENTIVE VISIT,EST,65 & OVER: ICD-10-PCS | Mod: S$PBB,,, | Performed by: INTERNAL MEDICINE

## 2021-06-21 PROCEDURE — 99215 OFFICE O/P EST HI 40 MIN: CPT | Mod: PBBFAC | Performed by: INTERNAL MEDICINE

## 2021-06-21 PROCEDURE — 99999 PR PBB SHADOW E&M-EST. PATIENT-LVL V: CPT | Mod: PBBFAC,,, | Performed by: INTERNAL MEDICINE

## 2021-06-21 PROCEDURE — 99999 PR PBB SHADOW E&M-EST. PATIENT-LVL V: ICD-10-PCS | Mod: PBBFAC,,, | Performed by: INTERNAL MEDICINE

## 2021-06-21 PROCEDURE — 99397 PER PM REEVAL EST PAT 65+ YR: CPT | Mod: S$PBB,,, | Performed by: INTERNAL MEDICINE

## 2021-06-22 ENCOUNTER — TELEPHONE (OUTPATIENT)
Dept: INTERNAL MEDICINE | Facility: CLINIC | Age: 66
End: 2021-06-22

## 2021-07-15 ENCOUNTER — PATIENT OUTREACH (OUTPATIENT)
Dept: ADMINISTRATIVE | Facility: OTHER | Age: 66
End: 2021-07-15

## 2021-07-20 ENCOUNTER — OFFICE VISIT (OUTPATIENT)
Dept: UROLOGY | Facility: CLINIC | Age: 66
End: 2021-07-20
Payer: COMMERCIAL

## 2021-07-20 VITALS
SYSTOLIC BLOOD PRESSURE: 130 MMHG | HEART RATE: 58 BPM | BODY MASS INDEX: 25.39 KG/M2 | WEIGHT: 167.56 LBS | DIASTOLIC BLOOD PRESSURE: 85 MMHG | HEIGHT: 68 IN

## 2021-07-20 DIAGNOSIS — R35.0 URINARY FREQUENCY: ICD-10-CM

## 2021-07-20 PROCEDURE — 1101F PT FALLS ASSESS-DOCD LE1/YR: CPT | Mod: CPTII,S$GLB,, | Performed by: UROLOGY

## 2021-07-20 PROCEDURE — 99204 PR OFFICE/OUTPT VISIT, NEW, LEVL IV, 45-59 MIN: ICD-10-PCS | Mod: S$GLB,,, | Performed by: UROLOGY

## 2021-07-20 PROCEDURE — 1101F PR PT FALLS ASSESS DOC 0-1 FALLS W/OUT INJ PAST YR: ICD-10-PCS | Mod: CPTII,S$GLB,, | Performed by: UROLOGY

## 2021-07-20 PROCEDURE — 3008F PR BODY MASS INDEX (BMI) DOCUMENTED: ICD-10-PCS | Mod: CPTII,S$GLB,, | Performed by: UROLOGY

## 2021-07-20 PROCEDURE — 99999 PR PBB SHADOW E&M-EST. PATIENT-LVL IV: CPT | Mod: PBBFAC,,, | Performed by: UROLOGY

## 2021-07-20 PROCEDURE — 3008F BODY MASS INDEX DOCD: CPT | Mod: CPTII,S$GLB,, | Performed by: UROLOGY

## 2021-07-20 PROCEDURE — 3075F PR MOST RECENT SYSTOLIC BLOOD PRESS GE 130-139MM HG: ICD-10-PCS | Mod: CPTII,S$GLB,, | Performed by: UROLOGY

## 2021-07-20 PROCEDURE — 1126F AMNT PAIN NOTED NONE PRSNT: CPT | Mod: CPTII,S$GLB,, | Performed by: UROLOGY

## 2021-07-20 PROCEDURE — 1159F PR MEDICATION LIST DOCUMENTED IN MEDICAL RECORD: ICD-10-PCS | Mod: CPTII,S$GLB,, | Performed by: UROLOGY

## 2021-07-20 PROCEDURE — 1159F MED LIST DOCD IN RCRD: CPT | Mod: CPTII,S$GLB,, | Performed by: UROLOGY

## 2021-07-20 PROCEDURE — 99204 OFFICE O/P NEW MOD 45 MIN: CPT | Mod: S$GLB,,, | Performed by: UROLOGY

## 2021-07-20 PROCEDURE — 3288F FALL RISK ASSESSMENT DOCD: CPT | Mod: CPTII,S$GLB,, | Performed by: UROLOGY

## 2021-07-20 PROCEDURE — 99999 PR PBB SHADOW E&M-EST. PATIENT-LVL IV: ICD-10-PCS | Mod: PBBFAC,,, | Performed by: UROLOGY

## 2021-07-20 PROCEDURE — 1126F PR PAIN SEVERITY QUANTIFIED, NO PAIN PRESENT: ICD-10-PCS | Mod: CPTII,S$GLB,, | Performed by: UROLOGY

## 2021-07-20 PROCEDURE — 3075F SYST BP GE 130 - 139MM HG: CPT | Mod: CPTII,S$GLB,, | Performed by: UROLOGY

## 2021-07-20 PROCEDURE — 3288F PR FALLS RISK ASSESSMENT DOCUMENTED: ICD-10-PCS | Mod: CPTII,S$GLB,, | Performed by: UROLOGY

## 2021-07-20 PROCEDURE — 3079F DIAST BP 80-89 MM HG: CPT | Mod: CPTII,S$GLB,, | Performed by: UROLOGY

## 2021-07-20 PROCEDURE — 3079F PR MOST RECENT DIASTOLIC BLOOD PRESSURE 80-89 MM HG: ICD-10-PCS | Mod: CPTII,S$GLB,, | Performed by: UROLOGY

## 2021-07-20 RX ORDER — TAMSULOSIN HYDROCHLORIDE 0.4 MG/1
0.4 CAPSULE ORAL DAILY
Qty: 30 CAPSULE | Refills: 12 | Status: SHIPPED | OUTPATIENT
Start: 2021-07-20 | End: 2022-07-26

## 2021-07-30 ENCOUNTER — HOSPITAL ENCOUNTER (OUTPATIENT)
Dept: RADIOLOGY | Facility: HOSPITAL | Age: 66
Discharge: HOME | End: 2021-07-30
Attending: UROLOGY
Payer: MEDICARE

## 2021-07-30 ENCOUNTER — OFFICE VISIT (OUTPATIENT)
Dept: HEPATOLOGY | Facility: CLINIC | Age: 66
End: 2021-07-30
Payer: COMMERCIAL

## 2021-07-30 VITALS
WEIGHT: 169.06 LBS | SYSTOLIC BLOOD PRESSURE: 136 MMHG | TEMPERATURE: 98 F | RESPIRATION RATE: 18 BRPM | BODY MASS INDEX: 25.62 KG/M2 | OXYGEN SATURATION: 98 % | HEART RATE: 62 BPM | DIASTOLIC BLOOD PRESSURE: 65 MMHG | HEIGHT: 68 IN

## 2021-07-30 DIAGNOSIS — R74.8 ELEVATED LIVER ENZYMES: ICD-10-CM

## 2021-07-30 DIAGNOSIS — R35.0 URINARY FREQUENCY: ICD-10-CM

## 2021-07-30 DIAGNOSIS — K76.0 FATTY LIVER: Primary | ICD-10-CM

## 2021-07-30 DIAGNOSIS — F10.11 HISTORY OF ALCOHOL ABUSE: ICD-10-CM

## 2021-07-30 PROCEDURE — 3075F SYST BP GE 130 - 139MM HG: CPT | Mod: CPTII,S$GLB,, | Performed by: NURSE PRACTITIONER

## 2021-07-30 PROCEDURE — 3288F PR FALLS RISK ASSESSMENT DOCUMENTED: ICD-10-PCS | Mod: CPTII,S$GLB,, | Performed by: NURSE PRACTITIONER

## 2021-07-30 PROCEDURE — 76770 US RETROPERITONEAL COMPLETE: ICD-10-PCS | Mod: 26,,, | Performed by: RADIOLOGY

## 2021-07-30 PROCEDURE — 76770 US EXAM ABDO BACK WALL COMP: CPT | Mod: TC

## 2021-07-30 PROCEDURE — 99999 PR PBB SHADOW E&M-EST. PATIENT-LVL V: ICD-10-PCS | Mod: PBBFAC,,, | Performed by: NURSE PRACTITIONER

## 2021-07-30 PROCEDURE — 99214 OFFICE O/P EST MOD 30 MIN: CPT | Mod: S$GLB,,, | Performed by: NURSE PRACTITIONER

## 2021-07-30 PROCEDURE — 76770 US EXAM ABDO BACK WALL COMP: CPT | Mod: 26,,, | Performed by: RADIOLOGY

## 2021-07-30 PROCEDURE — 1126F PR PAIN SEVERITY QUANTIFIED, NO PAIN PRESENT: ICD-10-PCS | Mod: CPTII,S$GLB,, | Performed by: NURSE PRACTITIONER

## 2021-07-30 PROCEDURE — 3008F PR BODY MASS INDEX (BMI) DOCUMENTED: ICD-10-PCS | Mod: CPTII,S$GLB,, | Performed by: NURSE PRACTITIONER

## 2021-07-30 PROCEDURE — 3078F DIAST BP <80 MM HG: CPT | Mod: CPTII,S$GLB,, | Performed by: NURSE PRACTITIONER

## 2021-07-30 PROCEDURE — 1101F PT FALLS ASSESS-DOCD LE1/YR: CPT | Mod: CPTII,S$GLB,, | Performed by: NURSE PRACTITIONER

## 2021-07-30 PROCEDURE — 99214 PR OFFICE/OUTPT VISIT, EST, LEVL IV, 30-39 MIN: ICD-10-PCS | Mod: S$GLB,,, | Performed by: NURSE PRACTITIONER

## 2021-07-30 PROCEDURE — 99999 PR PBB SHADOW E&M-EST. PATIENT-LVL V: CPT | Mod: PBBFAC,,, | Performed by: NURSE PRACTITIONER

## 2021-07-30 PROCEDURE — 3044F HG A1C LEVEL LT 7.0%: CPT | Mod: CPTII,S$GLB,, | Performed by: NURSE PRACTITIONER

## 2021-07-30 PROCEDURE — 1126F AMNT PAIN NOTED NONE PRSNT: CPT | Mod: CPTII,S$GLB,, | Performed by: NURSE PRACTITIONER

## 2021-07-30 PROCEDURE — 1101F PR PT FALLS ASSESS DOC 0-1 FALLS W/OUT INJ PAST YR: ICD-10-PCS | Mod: CPTII,S$GLB,, | Performed by: NURSE PRACTITIONER

## 2021-07-30 PROCEDURE — 3075F PR MOST RECENT SYSTOLIC BLOOD PRESS GE 130-139MM HG: ICD-10-PCS | Mod: CPTII,S$GLB,, | Performed by: NURSE PRACTITIONER

## 2021-07-30 PROCEDURE — 3288F FALL RISK ASSESSMENT DOCD: CPT | Mod: CPTII,S$GLB,, | Performed by: NURSE PRACTITIONER

## 2021-07-30 PROCEDURE — 3078F PR MOST RECENT DIASTOLIC BLOOD PRESSURE < 80 MM HG: ICD-10-PCS | Mod: CPTII,S$GLB,, | Performed by: NURSE PRACTITIONER

## 2021-07-30 PROCEDURE — 1159F PR MEDICATION LIST DOCUMENTED IN MEDICAL RECORD: ICD-10-PCS | Mod: CPTII,S$GLB,, | Performed by: NURSE PRACTITIONER

## 2021-07-30 PROCEDURE — 3044F PR MOST RECENT HEMOGLOBIN A1C LEVEL <7.0%: ICD-10-PCS | Mod: CPTII,S$GLB,, | Performed by: NURSE PRACTITIONER

## 2021-07-30 PROCEDURE — 1159F MED LIST DOCD IN RCRD: CPT | Mod: CPTII,S$GLB,, | Performed by: NURSE PRACTITIONER

## 2021-07-30 PROCEDURE — 3008F BODY MASS INDEX DOCD: CPT | Mod: CPTII,S$GLB,, | Performed by: NURSE PRACTITIONER

## 2021-08-12 ENCOUNTER — HOSPITAL ENCOUNTER (OUTPATIENT)
Dept: RADIOLOGY | Facility: HOSPITAL | Age: 66
Discharge: HOME | End: 2021-08-12
Attending: NURSE PRACTITIONER
Payer: MEDICARE

## 2021-08-12 DIAGNOSIS — K76.0 FATTY LIVER: ICD-10-CM

## 2021-08-12 PROCEDURE — 76700 US EXAM ABDOM COMPLETE: CPT | Mod: TC

## 2021-08-12 PROCEDURE — 76700 US ABDOMEN COMPLETE: ICD-10-PCS | Mod: 26,,, | Performed by: STUDENT IN AN ORGANIZED HEALTH CARE EDUCATION/TRAINING PROGRAM

## 2021-08-12 PROCEDURE — 76700 US EXAM ABDOM COMPLETE: CPT | Mod: 26,,, | Performed by: STUDENT IN AN ORGANIZED HEALTH CARE EDUCATION/TRAINING PROGRAM

## 2021-08-17 ENCOUNTER — TELEPHONE (OUTPATIENT)
Dept: UROLOGY | Facility: CLINIC | Age: 66
End: 2021-08-17

## 2021-08-26 ENCOUNTER — PATIENT MESSAGE (OUTPATIENT)
Dept: PHARMACY | Facility: CLINIC | Age: 66
End: 2021-08-26

## 2021-08-26 ENCOUNTER — OFFICE VISIT (OUTPATIENT)
Dept: HEPATOLOGY | Facility: CLINIC | Age: 66
End: 2021-08-26
Payer: MEDICARE

## 2021-08-26 ENCOUNTER — PATIENT OUTREACH (OUTPATIENT)
Dept: ADMINISTRATIVE | Facility: OTHER | Age: 66
End: 2021-08-26

## 2021-08-26 ENCOUNTER — PROCEDURE VISIT (OUTPATIENT)
Dept: HEPATOLOGY | Facility: CLINIC | Age: 66
End: 2021-08-26
Payer: MEDICARE

## 2021-08-26 VITALS
TEMPERATURE: 98 F | RESPIRATION RATE: 12 BRPM | WEIGHT: 170 LBS | SYSTOLIC BLOOD PRESSURE: 123 MMHG | BODY MASS INDEX: 25.85 KG/M2 | OXYGEN SATURATION: 97 % | HEART RATE: 69 BPM | DIASTOLIC BLOOD PRESSURE: 75 MMHG

## 2021-08-26 DIAGNOSIS — Z23 NEED FOR HEPATITIS A AND B VACCINATION: ICD-10-CM

## 2021-08-26 DIAGNOSIS — K76.0 FATTY LIVER: Primary | ICD-10-CM

## 2021-08-26 DIAGNOSIS — R79.89 ELEVATED FERRITIN: ICD-10-CM

## 2021-08-26 DIAGNOSIS — K76.0 FATTY LIVER: ICD-10-CM

## 2021-08-26 DIAGNOSIS — K74.01 HEPATIC FIBROSIS, EARLY FIBROSIS: ICD-10-CM

## 2021-08-26 PROCEDURE — 91200 LIVER ELASTOGRAPHY: CPT | Mod: PBBFAC | Performed by: NURSE PRACTITIONER

## 2021-08-26 PROCEDURE — 99214 OFFICE O/P EST MOD 30 MIN: CPT | Mod: S$PBB,,, | Performed by: NURSE PRACTITIONER

## 2021-08-26 PROCEDURE — 99999 PR PBB SHADOW E&M-EST. PATIENT-LVL V: ICD-10-PCS | Mod: PBBFAC,,, | Performed by: NURSE PRACTITIONER

## 2021-08-26 PROCEDURE — 91200 FIBROSCAN (VIBRATION CONTROLLED TRANSIENT ELASTOGRAPHY): ICD-10-PCS | Mod: 26,S$PBB,, | Performed by: NURSE PRACTITIONER

## 2021-08-26 PROCEDURE — 99215 OFFICE O/P EST HI 40 MIN: CPT | Mod: PBBFAC,25 | Performed by: NURSE PRACTITIONER

## 2021-08-26 PROCEDURE — 99214 PR OFFICE/OUTPT VISIT, EST, LEVL IV, 30-39 MIN: ICD-10-PCS | Mod: S$PBB,,, | Performed by: NURSE PRACTITIONER

## 2021-08-26 PROCEDURE — 99999 PR PBB SHADOW E&M-EST. PATIENT-LVL V: CPT | Mod: PBBFAC,,, | Performed by: NURSE PRACTITIONER

## 2021-08-26 PROCEDURE — 91200 LIVER ELASTOGRAPHY: CPT | Mod: 26,S$PBB,, | Performed by: NURSE PRACTITIONER

## 2021-08-26 RX ORDER — TIOTROPIUM BROMIDE 18 UG/1
18 CAPSULE ORAL; RESPIRATORY (INHALATION) DAILY
COMMUNITY

## 2022-01-11 ENCOUNTER — IMMUNIZATION (OUTPATIENT)
Dept: INTERNAL MEDICINE | Facility: CLINIC | Age: 67
End: 2022-01-11
Payer: MEDICARE

## 2022-01-11 DIAGNOSIS — Z23 NEED FOR VACCINATION: Primary | ICD-10-CM

## 2022-01-11 PROCEDURE — 0034A COVID-19,VECTOR-NR,RS-AD26,PF,0.5 ML DOSE VACCINE (JANSSEN): CPT | Mod: PBBFAC,CV19

## 2022-01-28 DIAGNOSIS — I10 ESSENTIAL HYPERTENSION: ICD-10-CM

## 2022-01-28 NOTE — TELEPHONE ENCOUNTER
No new care gaps identified.  Powered by [employer]. Reference number: [id number].   1/28/2022 11:27:00 AM CST

## 2022-02-02 DIAGNOSIS — I10 ESSENTIAL HYPERTENSION: ICD-10-CM

## 2022-02-02 NOTE — TELEPHONE ENCOUNTER
No new care gaps identified.  Powered by [employer]. Reference number: [id number].   2/02/2022 4:36:53 PM CST

## 2022-02-03 DIAGNOSIS — I10 ESSENTIAL HYPERTENSION: ICD-10-CM

## 2022-02-03 RX ORDER — LISINOPRIL 20 MG/1
20 TABLET ORAL DAILY
Qty: 90 TABLET | Refills: 3 | Status: SHIPPED | OUTPATIENT
Start: 2022-02-03 | End: 2023-01-24

## 2022-02-03 NOTE — TELEPHONE ENCOUNTER
No new care gaps identified.  Powered by [employer]. Reference number: [id number].   2/03/2022 9:59:27 AM CST

## 2022-02-03 NOTE — TELEPHONE ENCOUNTER
----- Message from Kofi Lamas sent at 2/2/2022  4:40 PM CST -----  Contact: [phone] pt  Requesting an RX refill or new RX.  Is this a refill or new RX: New  RX name and strength (copy/paste from chart):    Is this a 30 day or 90 day RX: 90  Pharmacy name and phone # (copy/paste from chart):  lisinopriL (PRINIVIL,ZESTRIL) 20 MG tablet  [id number] - [place], [place] - [facility] [place] [facility] AT [facility] [place] [address] [place] [address]  Phone: [phone] Fax: [phone]     The doctors have asked that we provide their patients with the following 2 reminders -- prescription refills can take up to 72 hours, and a friendly reminder that in the future you can use your [facility] account to request refills: yes      Please call and advise

## 2022-02-10 RX ORDER — LISINOPRIL 20 MG/1
TABLET ORAL
Qty: 90 TABLET | Refills: 0 | OUTPATIENT
Start: 2022-02-10

## 2022-02-14 RX ORDER — LISINOPRIL 20 MG/1
TABLET ORAL
Qty: 90 TABLET | Refills: 0 | OUTPATIENT
Start: 2022-02-14

## 2022-04-26 ENCOUNTER — OFFICE VISIT (OUTPATIENT)
Dept: HEPATOLOGY | Facility: CLINIC | Age: 67
End: 2022-04-26
Payer: MEDICARE

## 2022-04-26 ENCOUNTER — PROCEDURE VISIT (OUTPATIENT)
Dept: HEPATOLOGY | Facility: CLINIC | Age: 67
End: 2022-04-26
Payer: MEDICARE

## 2022-04-26 ENCOUNTER — TELEPHONE (OUTPATIENT)
Dept: PULMONOLOGY | Facility: CLINIC | Age: 67
End: 2022-04-26
Payer: MEDICARE

## 2022-04-26 ENCOUNTER — PATIENT OUTREACH (OUTPATIENT)
Dept: ADMINISTRATIVE | Facility: OTHER | Age: 67
End: 2022-04-26
Payer: MEDICARE

## 2022-04-26 VITALS
OXYGEN SATURATION: 99 % | BODY MASS INDEX: 26.63 KG/M2 | TEMPERATURE: 97 F | HEART RATE: 100 BPM | DIASTOLIC BLOOD PRESSURE: 78 MMHG | SYSTOLIC BLOOD PRESSURE: 171 MMHG | HEIGHT: 68 IN | WEIGHT: 175.69 LBS | RESPIRATION RATE: 19 BRPM

## 2022-04-26 DIAGNOSIS — K74.01 HEPATIC FIBROSIS, EARLY FIBROSIS: ICD-10-CM

## 2022-04-26 DIAGNOSIS — J43.2 CENTRILOBULAR EMPHYSEMA: ICD-10-CM

## 2022-04-26 DIAGNOSIS — R79.89 ELEVATED FERRITIN: ICD-10-CM

## 2022-04-26 DIAGNOSIS — K74.00 LIVER FIBROSIS: ICD-10-CM

## 2022-04-26 DIAGNOSIS — K76.0 FATTY LIVER: Primary | ICD-10-CM

## 2022-04-26 DIAGNOSIS — Z14.8 CARRIER OF HEMOCHROMATOSIS HFE GENE MUTATION: ICD-10-CM

## 2022-04-26 DIAGNOSIS — K76.0 FATTY LIVER: ICD-10-CM

## 2022-04-26 PROCEDURE — 91200 FIBROSCAN (VIBRATION CONTROLLED TRANSIENT ELASTOGRAPHY): ICD-10-PCS | Mod: 26,S$PBB,, | Performed by: NURSE PRACTITIONER

## 2022-04-26 PROCEDURE — 99214 OFFICE O/P EST MOD 30 MIN: CPT | Mod: S$PBB,,, | Performed by: NURSE PRACTITIONER

## 2022-04-26 PROCEDURE — 91200 LIVER ELASTOGRAPHY: CPT | Mod: PBBFAC | Performed by: NURSE PRACTITIONER

## 2022-04-26 PROCEDURE — 91200 LIVER ELASTOGRAPHY: CPT | Mod: 26,S$PBB,, | Performed by: NURSE PRACTITIONER

## 2022-04-26 PROCEDURE — 99999 PR PBB SHADOW E&M-EST. PATIENT-LVL V: CPT | Mod: PBBFAC,,, | Performed by: NURSE PRACTITIONER

## 2022-04-26 PROCEDURE — 99215 OFFICE O/P EST HI 40 MIN: CPT | Mod: PBBFAC | Performed by: NURSE PRACTITIONER

## 2022-04-26 PROCEDURE — 99214 PR OFFICE/OUTPT VISIT, EST, LEVL IV, 30-39 MIN: ICD-10-PCS | Mod: S$PBB,,, | Performed by: NURSE PRACTITIONER

## 2022-04-26 PROCEDURE — 99999 PR PBB SHADOW E&M-EST. PATIENT-LVL V: ICD-10-PCS | Mod: PBBFAC,,, | Performed by: NURSE PRACTITIONER

## 2022-04-26 NOTE — PROGRESS NOTES
Health Maintenance Due   Topic Date Due    TETANUS VACCINE  Never done    LDCT Lung Screen  02/15/2014    Shingles Vaccine (2 of 3) 12/20/2016    Influenza Vaccine (1) 09/01/2021    Colorectal Cancer Screening  09/21/2021     Updates were requested from care everywhere.  Chart was reviewed for overdue Proactive [facility] Encounters (YUMIKO) topics (CRS, Breast Cancer Screening, Eye exam)  Health Maintenance has been updated.  LINKS immunization registry triggered.  Immunizations were reconciled.

## 2022-04-26 NOTE — PATIENT INSTRUCTIONS
1. Fibroscan to look for fat or scar tissue in the liver showed moderate scar tissue from fatty liver, significant fat in the liver (>67% fat in the liver)  2. Overdue for last Hep A/B vaccine, get soon   3. Reschedule missed US and labs for now   4. Follow up in 1 year with US and labs a few days before, fibroscan same day   5. STOP ALL ALCOHOL    There is no FDA approved therapy for fatty liver disease. Therefore, these things are important:  Limit alcohol consumption, none given possible liver scar tissue   2 Weight loss goal of 5-10 lbs, referral for [facility] if interested. Also, if interested in a dietician visit to create a weight loss plan, contact the dietician team at [facility] at [email or web address] to schedule a visit to you can call [facility] in [place]: [phone] and the  will transfer the call to one of the dieticians to schedule an appointment. Or you can also call [phone] to schedule. They do offer video visits   3. Low carb/sugar, high fiber and protein diet.Try to limit your carb intake to LESS than 30-45 grams of carbs with a meal or LESS than 5-10 grams with any snack (total of any snack foods eaten during that time). Use [employer] ignacio to add up your carbs through the day. Do NOT drink any beverages with calories or carbs (this can lead to high blood sugar and weight gain). Also, some of our patients have been very successful with weight loss using the pre made/planned meal planning services that limit calories and portion size (one example is Sensible Meals but there are many out there)  4. Exercise, 5 days per week, 30 minutes per day, as tolerated  5. Recommend good cholesterol, blood pressure, blood sugar levels     In some people, fatty liver can progress to steatohepatitis (inflamatory fatty liver) and possibly to cirrhosis, putting one at increased risk for liver cancer, liver failure, and death. Therefore, the lifestyle  changes are very important to decrease this risk.     Website with information about fatty liver and inflammation related to fatty liver (MORALES) = www.nashtruth.com  AND [email or web address]

## 2022-04-26 NOTE — PROGRESS NOTES
[facility] VISIT FOLLOW UP NOTE    PCP: Yasmin Prather MD     CHIEF COMPLAINT: fatty liver, liver fibrosis, HH DNA carrier    HPI: This is a 66 y.o. 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    Prior serologic workup:   Lab Results   Component Value Date    SMOOTHMUSCAB Negative 1:40 08/16/2018    AMAIFA Negative 1:40 08/16/2018    ANASCREEN Negative <1:160 08/16/2018    FERRITIN 83 07/30/2021    FESATURATED 20 07/30/2021    PETH Negative 07/30/2021    [id number] MS 08/16/2018    IWHAQ6AFJSCW 117 08/16/2018    CERULOPLSM 29.0 08/16/2018    HEPBSAG Negative 08/16/2018    HEPCAB Negative 04/29/2016     Risk factors for fatty liver include heavy alcohol use     Liver fibrosis staging:  -- fibroscan in 2018 was F0, S2 (kPA 6.3, )  -- fibroscan 8/2021 noted F2, S2 (kPA 7.9, )  --fibroascan 4/2022 noted F2, S3 (kPA 9.5, )    Interval HPI: Presents today alone. Stopped alcohol completely ~ May 2021, but resumed in 2021 and now drinking heavily/daily again  Did not complete labs and US before visit as scheduled     Labs done 7/2021 show improved/normal transaminase levels (previously fluctuating labs, AST>ALT, elevated intermittently since 2016)  Platelets wnl, alk phos WNL  Synthetic liver functioning WNL    Lab Results   Component Value Date    ALT 14 07/30/2021    AST 18 07/30/2021    ALKPHOS 65 07/30/2021    BILITOT 0.5 07/30/2021    ALBUMIN 3.7 07/30/2021    INR 0.9 08/16/2018     06/14/2021     Abd U/S done 8/2021 noted no lesions, no splenomegaly - needs repeat soon, did not schedule before visit     Denies family history of  "liver disease . + heavy Alcohol consumption, see below  Social History     Substance and Sexual Activity   Alcohol Use Yes    Comment: 1/5 of PaintZen weekly, stopped completed May 2021 but resumed in 2021, drinking nightly 3-5 servings nightly       Immunity to Hep A and B - started TwinRix vaccine series, needs last dose, overdue - pt to get today          Allergy and medication list reviewed and updated     PMHX:  has a past medical history of Bronchitis chronic, COPD (chronic obstructive pulmonary disease), Degenerative disc disease, Erectile dysfunction, GERD (gastroesophageal reflux disease), H/O: GI bleed, HTN (hypertension) (2/26/2013), Personal history of colonic polyps (8/9), and Seizures.    PSHX:  has a past surgical history that includes Tonsillectomy; Colonoscopy w/ polypectomy; Esophagogastroduodenoscopy; and Colonoscopy (N/A, 9/21/2018).    FAMILY HISTORY: Updated and reviewed in [facility]    SOCIAL HISTORY:   Social History     Substance and Sexual Activity   Alcohol Use Yes    Comment: 1/5 of PaintZen weekly, stopped completed May 2021       Social History     Substance and Sexual Activity   Drug Use No       ROS:   GENERAL: Denies fatigue  CARDIOVASCULAR: Denies edema  GI: Denies abdominal pain  SKIN: Denies rash, itching   NEURO: Denies confusion, memory loss, or mood changes    PHYSICAL EXAM:   Friendly White male, in no acute distress; alert and oriented to person, place and time  VITALS: BP (!) 171/78 (BP Location: Right arm, Patient Position: Sitting, BP Method: Large (Automatic))   Pulse 100   Temp 97.1 °F (36.2 °C) (Oral)   Resp 19   Ht 5' 8" (1.727 m)   Wt 79.7 kg (175 lb 11.3 oz)   SpO2 99%   BMI 26.72 kg/m²   EYES: Sclerae anicteric  GI: Soft, non-tender, non-distended. No ascites.  EXTREMITIES:  No edema.  SKIN: Warm and dry. No jaundice. No telangectasias noted. No palmar erythema.  NEURO:  No asterixis.  PSYCH:  Thought and speech pattern appropriate. Behavior " normal      EDUCATION:  See instructions discussed with patient in Instructions section of the After Visit Summary     ASSESSMENT & PLAN:  66 y.o. White male with:  1.  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 Limit alcohol consumption, none given scar tissue   2  Maintain normal weight  3. Low carb/sugar, high fiber and protein diet  4. Exercise, 5 days per week, 30 minutes per day, as tolerated  5. Recommend good cholesterol, blood pressure, blood sugar levels    2. Liver fibrosis   -- fibroscan F2  4/2022, will repeat yearly, Reinforced need to remain abstinent of alcohol completely given liver fibrosis     3. Alcohol abuse   - recommend stopping completely, pt aware, not interested in rehab referral  Social History     Substance and Sexual Activity   Alcohol Use Yes    Comment: 1/5 of whiskey weekly, stopped completed May 2021 but resumed in 2021, drinking nightly 3-5 servings nightly     4. 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 Low suspicion for clinical significant iron overload given carrier status but will continue to monitor with labs and refer to hematology as needed     5. COPD  -- requesting new referral, much overdue and needs f/u appt     US and labs soon then    Follow up in about 1 year (around 4/26/2023). with US and labs 1 week before,   fibroscan same day as appt     Thank you for allowing me to participate in the care of KASSANDRA Urrutia    I spent a total of 30 minutes on the day of the visit.This includes face to face time and non-face to face time preparing to see the patient (eg, review of tests), obtaining and/or reviewing separately obtained history, documenting clinical information in the electronic or other health record, independently interpreting results and communicating results to the patient/family/caregiver, and coordinating care.         CC'ed note to:   Yasmin Prather MD

## 2022-04-26 NOTE — TELEPHONE ENCOUNTER
Spoke with patient, informed him that I have received his message. I advised patient that I can schedule his appointment with Dr Escalona on 6/27/22 for 8:00am and place patient appointment on wait list as well. Patient verbalized that he understand and accepted the appointment.

## 2022-04-26 NOTE — TELEPHONE ENCOUNTER
----- Message from Mariama Bello sent at 4/26/2022 11:04 AM CDT -----  Regarding: speak with nurse  Contact: patient  [phone]   please call patient would like to get in soon have shortness of breath waiting on a call back thanks.

## 2022-04-26 NOTE — PROCEDURES
FibroScan (Vibration Controlled Transient Elastography)    Date/Time: 4/26/2022 9:15 AM  Performed by: Marcie Ignacio NP  Authorized by: Marcie Ignacio NP     Diagnosis:  NAFLD    Probe:  M    Universal Protocol: Patient's identity, procedure and site were verified, confirmatory pause was performed.  Discussed procedure including risks and potential complications.  Questions answered.  Patient verbalizes understanding and wishes to proceed with VCTE.     Procedure: After providing explanations of the procedure, patient was placed in the supine position with right arm in maximum abduction to allow optimal exposure of right lateral abdomen.  Patient was briefly assessed, Testing was performed in the mid-axillary location, 50Hz Shear Wave pulses were applied and the resulting Shear Wave and Propagation Speed detected with a 3.5 MHz ultrasonic signal, using the FibroScan probe, Skin to liver capsule distance and liver parenchyma were accessed during the entire examination with the FibroScan probe, Patient was instructed to breathe normally and to abstain from sudden movements during the procedure, allowing for random measurements of liver stiffness. At least 10 Shear Waves were produced, Individual measurements of each Shear Wave were calculated.  Patient tolerated the procedure well with no complications.  Meets discharge criteria as was dismissed.  Rates pain 0 out of 10.  Patient will follow up with ordering provider to review results.      Findings  Median liver stiffness score:  9.5  CAP Reading: dB/m:  396    IQR/med %:  20  Interpretation  Fibrosis interpretation is based on medial liver stiffness - Kilopascal (kPa).    Fibrosis Stage:  F2  Steatosis interpretation is based on controlled attenuation parameter - (dB/m).    Steatosis Grade:  S3

## 2022-05-02 ENCOUNTER — HOSPITAL ENCOUNTER (OUTPATIENT)
Dept: RADIOLOGY | Facility: HOSPITAL | Age: 67
Discharge: HOME | End: 2022-05-02
Attending: NURSE PRACTITIONER
Payer: MEDICARE

## 2022-05-02 DIAGNOSIS — K76.0 FATTY LIVER: ICD-10-CM

## 2022-05-02 DIAGNOSIS — K74.00 LIVER FIBROSIS: ICD-10-CM

## 2022-05-02 PROCEDURE — 76705 ECHO EXAM OF ABDOMEN: CPT | Mod: 26,,, | Performed by: STUDENT IN AN ORGANIZED HEALTH CARE EDUCATION/TRAINING PROGRAM

## 2022-05-02 PROCEDURE — 76705 US ABDOMEN LIMITED: ICD-10-PCS | Mod: 26,,, | Performed by: STUDENT IN AN ORGANIZED HEALTH CARE EDUCATION/TRAINING PROGRAM

## 2022-05-02 PROCEDURE — 76705 ECHO EXAM OF ABDOMEN: CPT | Mod: TC

## 2022-05-03 ENCOUNTER — PATIENT MESSAGE (OUTPATIENT)
Dept: HEPATOLOGY | Facility: CLINIC | Age: 67
End: 2022-05-03
Payer: MEDICARE

## 2022-06-06 ENCOUNTER — HOSPITAL ENCOUNTER (OUTPATIENT)
Dept: RADIOLOGY | Facility: HOSPITAL | Age: 67
Discharge: HOME | End: 2022-06-06
Attending: INTERNAL MEDICINE
Payer: MEDICARE

## 2022-06-06 DIAGNOSIS — J43.2 CENTRILOBULAR EMPHYSEMA: ICD-10-CM

## 2022-06-06 DIAGNOSIS — J44.9 CHRONIC OBSTRUCTIVE PULMONARY DISEASE, UNSPECIFIED COPD TYPE: ICD-10-CM

## 2022-06-06 PROCEDURE — 71046 XR CHEST PA AND LATERAL: ICD-10-PCS | Mod: 26,,, | Performed by: RADIOLOGY

## 2022-06-06 PROCEDURE — 71046 X-RAY EXAM CHEST 2 VIEWS: CPT | Mod: TC

## 2022-06-06 PROCEDURE — 71046 X-RAY EXAM CHEST 2 VIEWS: CPT | Mod: 26,,, | Performed by: RADIOLOGY

## 2022-06-27 ENCOUNTER — OFFICE VISIT (OUTPATIENT)
Dept: PULMONOLOGY | Facility: CLINIC | Age: 67
End: 2022-06-27
Payer: MEDICARE

## 2022-06-27 VITALS
SYSTOLIC BLOOD PRESSURE: 160 MMHG | HEIGHT: 68 IN | OXYGEN SATURATION: 97 % | DIASTOLIC BLOOD PRESSURE: 78 MMHG | BODY MASS INDEX: 26.63 KG/M2 | HEART RATE: 100 BPM | WEIGHT: 175.69 LBS

## 2022-06-27 DIAGNOSIS — F10.11 HISTORY OF ALCOHOL ABUSE: ICD-10-CM

## 2022-06-27 DIAGNOSIS — I10 PRIMARY HYPERTENSION: ICD-10-CM

## 2022-06-27 DIAGNOSIS — Z87.891 FORMER HEAVY TOBACCO SMOKER: ICD-10-CM

## 2022-06-27 DIAGNOSIS — J43.2 CENTRILOBULAR EMPHYSEMA: Primary | ICD-10-CM

## 2022-06-27 DIAGNOSIS — K76.0 FATTY LIVER: ICD-10-CM

## 2022-06-27 DIAGNOSIS — K21.9 GASTROESOPHAGEAL REFLUX DISEASE, UNSPECIFIED WHETHER ESOPHAGITIS PRESENT: ICD-10-CM

## 2022-06-27 PROCEDURE — 99204 OFFICE O/P NEW MOD 45 MIN: CPT | Mod: S$PBB,,, | Performed by: INTERNAL MEDICINE

## 2022-06-27 PROCEDURE — 99999 PR PBB SHADOW E&M-EST. PATIENT-LVL III: ICD-10-PCS | Mod: PBBFAC,,, | Performed by: INTERNAL MEDICINE

## 2022-06-27 PROCEDURE — 99999 PR PBB SHADOW E&M-EST. PATIENT-LVL III: CPT | Mod: PBBFAC,,, | Performed by: INTERNAL MEDICINE

## 2022-06-27 PROCEDURE — 99204 PR OFFICE/OUTPT VISIT, NEW, LEVL IV, 45-59 MIN: ICD-10-PCS | Mod: S$PBB,,, | Performed by: INTERNAL MEDICINE

## 2022-06-27 PROCEDURE — 99213 OFFICE O/P EST LOW 20 MIN: CPT | Mod: PBBFAC | Performed by: INTERNAL MEDICINE

## 2022-06-27 NOTE — ASSESSMENT & PLAN NOTE
Elevated BP today, follows with PCP, typically well controlled, encouraged him to follow up with his PCP and to recheck it in a more relaxed setting at home.

## 2022-06-27 NOTE — PROGRESS NOTES
Subjective:     Reason for visit: Shortness of breath    Patient ID:  Nilo Hoover is a 66 y.o. male    Interval History:  Mr. Hoover is a 65 yo with HTN, fatty liver disease/cirrhosis, and GERD that presents with progressively worsening shortness of breath. He was diagnosed with COPD back 10-12 years ago and quit smoking roughly ten years ago. He has been on symbicort and spirva for years and was doing well with no issues up until the last year. He has noted a gradual increase in his shortness of breath despite continued use of his inhalers. He has started using his rescue albtuerol inhaler much more often (2-3 times a week) and notes a lot of trouble with the heat. He can't identify any other specific triggers. He does improve with his albuterol and rest. No recent URI, fevers, chills, or change in sputum production.       Additional Pulmonary History:  Childhood Illnesses:  None   Occupational Exposures:  Retired   Environmental Exposures:  NA  Tobacco/Smoking History:  Quit ten years ago, 30 + pack year history   Travel History:  No recent travel    Review of Systems   Constitutional: Positive for malaise/fatigue. Negative for chills, fever and weight loss.   HENT: Negative for congestion, nosebleeds and sinus pain.    Eyes: Negative for double vision, pain and redness.   Respiratory: Positive for sputum production, shortness of breath and wheezing. Negative for cough, hemoptysis and stridor.    Cardiovascular: Negative for chest pain, claudication and leg swelling.   Gastrointestinal: Negative for abdominal pain, diarrhea, heartburn and vomiting.   Genitourinary: Positive for frequency and urgency. Negative for dysuria and hematuria.   Musculoskeletal: Negative for back pain, joint pain and neck pain.   Skin: Negative for rash.   Neurological: Negative for dizziness, tremors, sensory change, speech change, weakness and headaches.   Endo/Heme/Allergies: Negative for environmental allergies.  "Does not bruise/bleed easily.   Psychiatric/Behavioral: Negative for depression, hallucinations and suicidal ideas. The patient is not nervous/anxious and does not have insomnia.         Objective:     Vitals:    06/27/22 0816   BP: (!) 160/78   BP Location: Left arm   Patient Position: Sitting   Pulse: 100   SpO2: 97%   Weight: 79.7 kg (175 lb 11.3 oz)   Height: 5' 8" (1.727 m)         Physical Exam  Vitals and nursing note reviewed.   Constitutional:       General: He is not in acute distress.     Appearance: He is not diaphoretic.   HENT:      Head: Normocephalic and atraumatic.      Right Ear: External ear normal.      Left Ear: External ear normal.   Eyes:      Conjunctiva/sclera: Conjunctivae normal.      Pupils: Pupils are equal, round, and reactive to light.   Neck:      Trachea: No tracheal deviation.   Cardiovascular:      Rate and Rhythm: Normal rate and regular rhythm.      Heart sounds: Normal heart sounds. No murmur heard.  Pulmonary:      Effort: Pulmonary effort is normal. No respiratory distress.      Breath sounds: Normal breath sounds. No stridor. No wheezing or rales.   Abdominal:      General: Bowel sounds are normal. There is no distension.      Palpations: Abdomen is soft.      Tenderness: There is no abdominal tenderness.   Musculoskeletal:         General: Normal range of motion.      Cervical back: Normal range of motion and neck supple.   Skin:     General: Skin is warm and dry.      Findings: No erythema.   Neurological:      Mental Status: He is alert and oriented to person, place, and time.      Gait: Gait is intact.   Psychiatric:         Mood and Affect: Mood and affect normal.         Cognition and Memory: Memory normal.         Judgment: Judgment normal.          Personal Diagnostic Review and Interpretation  Last X ray with no significant changes- done in 2021      Pertinent Studies Reviewed and Interpreted:     Pulmonary Function Tests:   Last performed in 2018- mild obstruction " noted on spirometry, lung volumes not done.    Assessment:     1. Centrilobular emphysema  Complete PFT w/ bronchodilator    CT Chest Lung Screening Low Dose    Echo    Stress test, pulmonary   2. Former heavy tobacco smoker  CT Chest Lung Screening Low Dose    Stress test, pulmonary   3. History of alcohol abuse     4. Primary hypertension     5. Gastroesophageal reflux disease, unspecified whether esophagitis present     6. Fatty liver         Current Outpatient Medications   Medication Instructions    albuterol (PROAIR HFA) 90 mcg/actuation inhaler 2 puffs, Inhalation, Every 6 hours PRN    budesonide-formoterol 160-4.5 mcg (SYMBICORT) 160-4.5 mcg/actuation HFAA Inhale 2 puffs into the lungs every 12 hours.    lisinopriL (PRINIVIL,ZESTRIL) 20 mg, Oral, Daily    omeprazole 20 mg TbEC Take by mouth. 1 Tablet, Delayed Release (E.C.) Oral Every day    SPIRIVA WITH HANDIHALER 18 mcg inhalation capsule INHALE THE CONTENTS OF 1 CAPSULE(18 MCG) INTO THE LUNGS EVERY DAY    SYMBICORT 160-4.5 mcg/actuation HFAA INHALE 2 PUFFS INTO THE LUNGS EVERY 12 HOURS    tamsulosin (FLOMAX) 0.4 mg, Oral, Daily    tiotropium (SPIRIVA) 18 mcg inhalation capsule Inhale 1 capsule (18mcg) into the lungs once daily.    tiotropium (SPIRIVA) 18 mcg, Inhalation, Daily, Controller       Nilo Hoover had no medications administered during this visit.     Orders Placed This Encounter   Procedures    CT Chest Lung Screening Low Dose     Standing Status:   Future     Standing Expiration Date:   6/27/2023     Order Specific Question:   Is there documentation of shared decision making for this lung screening exam?     Answer:   Yes     Order Specific Question:   Is the patient a current smoker?     Answer:   No     Order Specific Question:   How many years has the patient quit smoking?     Answer:   10     Order Specific Question:   Is the patient a current or former smoker who quit within the past 15 years?     Answer:   Yes     Order  Specific Question:   Is the patient between the age 50-80 years old?     Answer:   Yes     Order Specific Question:   Has the patient smoked 20 or more packs of cigarettes/year?     Answer:   Yes     Order Specific Question:   Does the patient show any signs or symptoms of lung cancer?     Answer:   No     Order Specific Question:   Is this the first (baseline) CT or an annual exam?     Answer:   Baseline [1]     Order Specific Question:   May the Radiologist modify the order per protocol to meet the clinical needs of the patient?     Answer:   Yes     Order Specific Question:   Is this a low dose screening chest CT?     Answer:   Yes     Order Specific Question:   Is this a low dose screening chest CT?     Answer:   Yes    Echo     Standing Status:   Future     Standing Expiration Date:   6/27/2023     Order Specific Question:   Release to patient     Answer:   Immediate    Complete PFT w/ bronchodilator     Standing Status:   Future     Standing Expiration Date:   6/27/2023     Order Specific Question:   Release to patient     Answer:   Immediate    Stress test, pulmonary     Standing Status:   Future     Standing Expiration Date:   6/27/2023     Order Specific Question:   Reason for study     Answer:   Functional status     Order Specific Question:   Release to patient     Answer:   Immediate      Plan:       Problem List Items Addressed This Visit        Psychiatric    History of alcohol abuse    Current Assessment & Plan     Drinks around 1 pint of whiskey a week              Pulmonary    COPD (chronic obstructive pulmonary disease) - Primary    Current Assessment & Plan     Obstruction on PFTs from 2018, on triple therapy with symbicort + spiriva and PRN albuterol  -- previous 30+ pack year smoking history, stopped smoking 10 years ago  - continue current inhalers  - repeat PFTs/ 6 minute walk test  - order low dose screening CT, discussed the purpose of this test and that it is a screening test given his  increased risk of lung cancer. He is agreeable to that test.  -- given his recent increase in shortness of breath, will also obtain an echo to ensure cardiac function and PA pressures are normal.              Relevant Orders    Complete PFT w/ bronchodilator    CT Chest Lung Screening Low Dose    Echo    Stress test, pulmonary       Cardiac/Vascular    HTN (hypertension)    Current Assessment & Plan     Elevated BP today, follows with PCP, typically well controlled, encouraged him to follow up with his PCP and to recheck it in a more relaxed setting at home.               GI    GERD (gastroesophageal reflux disease)    Current Assessment & Plan     Well controlled on his omeprazole           Fatty liver    Current Assessment & Plan     Follows with hepatology             Other Visit Diagnoses     Former heavy tobacco smoker        Relevant Orders    CT Chest Lung Screening Low Dose    Stress test, pulmonary           40 minutes of total time spent on the encounter, which includes face-to-face time and non-face-to-face time preparing to see the patient (eg, review of tests), obtaining and/or reviewing separately obtained history, documenting clinical information in the electronic or other health record, independently interpreting results (not separately reported), and communicating results to the patient/family/caregiver, or care coordination (not separately reported).     Portions of the record may have been created with voice-recognition software. Occasional wrong-word or sound-a-like substitutions may have occurred due to the inherent limitations of voice-recognition software. Read the chart carefully and recognize, using context, where substitutions have occurred.    Melany Escalona M.D.  Pulmonary/Critical Care

## 2022-06-27 NOTE — ASSESSMENT & PLAN NOTE
Obstruction on PFTs from 2018, on triple therapy with symbicort + spiriva and PRN albuterol  -- previous 30+ pack year smoking history, stopped smoking 10 years ago  - continue current inhalers  - repeat PFTs/ 6 minute walk test  - order low dose screening CT, discussed the purpose of this test and that it is a screening test given his increased risk of lung cancer. He is agreeable to that test.  -- given his recent increase in shortness of breath, will also obtain an echo to ensure cardiac function and PA pressures are normal.

## 2022-07-06 ENCOUNTER — HOSPITAL ENCOUNTER (OUTPATIENT)
Dept: CARDIOLOGY | Facility: HOSPITAL | Age: 67
Discharge: HOME | End: 2022-07-06
Attending: INTERNAL MEDICINE
Payer: MEDICARE

## 2022-07-06 ENCOUNTER — HOSPITAL ENCOUNTER (OUTPATIENT)
Dept: RADIOLOGY | Facility: HOSPITAL | Age: 67
Discharge: HOME | End: 2022-07-06
Attending: INTERNAL MEDICINE
Payer: MEDICARE

## 2022-07-06 VITALS
DIASTOLIC BLOOD PRESSURE: 88 MMHG | HEART RATE: 75 BPM | BODY MASS INDEX: 26.52 KG/M2 | WEIGHT: 175 LBS | SYSTOLIC BLOOD PRESSURE: 154 MMHG | HEIGHT: 68 IN

## 2022-07-06 DIAGNOSIS — J43.2 CENTRILOBULAR EMPHYSEMA: ICD-10-CM

## 2022-07-06 DIAGNOSIS — Z87.891 FORMER HEAVY TOBACCO SMOKER: ICD-10-CM

## 2022-07-06 LAB
ASCENDING AORTA: 3.7 CM
AV INDEX (PROSTH): 0.96
AV MEAN GRADIENT: 2 MMHG
AV PEAK GRADIENT: 5 MMHG
AV VALVE AREA: 4.15 CM2
AV VELOCITY RATIO: 0.79
BSA FOR ECHO PROCEDURE: 1.95 M2
CV ECHO LV RWT: 0.37 CM
DOP CALC AO PEAK VEL: 1.07 M/S
DOP CALC AO VTI: 19.37 CM
DOP CALC LVOT AREA: 4.3 CM2
DOP CALC LVOT DIAMETER: 2.35 CM
DOP CALC LVOT PEAK VEL: 0.84 M/S
DOP CALC LVOT STROKE VOLUME: 80.46 CM3
DOP CALCLVOT PEAK VEL VTI: 18.56 CM
E WAVE DECELERATION TIME: 163.31 MSEC
E/A RATIO: 0.87
E/E' RATIO: 11.73 M/S
ECHO LV POSTERIOR WALL: 0.91 CM (ref 0.6–1.1)
EJECTION FRACTION: 65 %
FRACTIONAL SHORTENING: 33 % (ref 28–44)
INTERVENTRICULAR SEPTUM: 0.88 CM (ref 0.6–1.1)
IVRT: 77.07 MSEC
LA MAJOR: 5.17 CM
LA MINOR: 5.05 CM
LA WIDTH: 3.66 CM
LEFT ATRIUM SIZE: 3.48 CM
LEFT ATRIUM VOLUME INDEX MOD: 22.4 ML/M2
LEFT ATRIUM VOLUME INDEX: 28.7 ML/M2
LEFT ATRIUM VOLUME MOD: 43.17 CM3
LEFT ATRIUM VOLUME: 55.31 CM3
LEFT INTERNAL DIMENSION IN SYSTOLE: 3.29 CM (ref 2.1–4)
LEFT VENTRICLE DIASTOLIC VOLUME INDEX: 58.06 ML/M2
LEFT VENTRICLE DIASTOLIC VOLUME: 112.06 ML
LEFT VENTRICLE MASS INDEX: 78 G/M2
LEFT VENTRICLE SYSTOLIC VOLUME INDEX: 22.7 ML/M2
LEFT VENTRICLE SYSTOLIC VOLUME: 43.82 ML
LEFT VENTRICULAR INTERNAL DIMENSION IN DIASTOLE: 4.89 CM (ref 3.5–6)
LEFT VENTRICULAR MASS: 151.31 G
LV LATERAL E/E' RATIO: 9.78 M/S
LV SEPTAL E/E' RATIO: 14.67 M/S
MV A" WAVE DURATION": 8.85 MSEC
MV PEAK A VEL: 1.01 M/S
MV PEAK E VEL: 0.88 M/S
MV STENOSIS PRESSURE HALF TIME: 47.36 MS
MV VALVE AREA P 1/2 METHOD: 4.65 CM2
PISA TR MAX VEL: 2.21 M/S
PULM VEIN S/D RATIO: 1.28
PV PEAK D VEL: 0.6 M/S
PV PEAK S VEL: 0.77 M/S
RA MAJOR: 4.74 CM
RA PRESSURE: 3 MMHG
RA WIDTH: 3.56 CM
RIGHT VENTRICULAR END-DIASTOLIC DIMENSION: 3.7 CM
RV TISSUE DOPPLER FREE WALL SYSTOLIC VELOCITY 1 (APICAL 4 CHAMBER VIEW): 11.64 CM/S
SINUS: 3.99 CM
STJ: 3.12 CM
TDI LATERAL: 0.09 M/S
TDI SEPTAL: 0.06 M/S
TDI: 0.08 M/S
TR MAX PG: 20 MMHG
TV REST PULMONARY ARTERY PRESSURE: 23 MMHG

## 2022-07-06 PROCEDURE — 93306 ECHO (CUPID ONLY): ICD-10-PCS | Mod: 26,,, | Performed by: INTERNAL MEDICINE

## 2022-07-06 PROCEDURE — C8929 TTE W OR WO FOL WCON,DOPPLER: HCPCS

## 2022-07-06 PROCEDURE — 71271 CT THORAX LUNG CANCER SCR C-: CPT | Mod: TC

## 2022-07-06 PROCEDURE — 71271 CT THORAX LUNG CANCER SCR C-: CPT | Mod: 26,,, | Performed by: RADIOLOGY

## 2022-07-06 PROCEDURE — 71271 CT CHEST LUNG SCREENING LOW DOSE: ICD-10-PCS | Mod: 26,,, | Performed by: RADIOLOGY

## 2022-07-06 PROCEDURE — 93306 TTE W/DOPPLER COMPLETE: CPT | Mod: 26,,, | Performed by: INTERNAL MEDICINE

## 2022-07-06 PROCEDURE — 25500020 PHARM REV CODE 255: Performed by: INTERNAL MEDICINE

## 2022-07-06 RX ADMIN — HUMAN ALBUMIN MICROSPHERES AND PERFLUTREN 0.66 MG: 10; .22 INJECTION, SOLUTION INTRAVENOUS at 01:07

## 2022-07-20 ENCOUNTER — OFFICE VISIT (OUTPATIENT)
Dept: URGENT CARE | Facility: CLINIC | Age: 67
End: 2022-07-20
Payer: MEDICARE

## 2022-07-20 VITALS
HEART RATE: 71 BPM | HEIGHT: 68 IN | TEMPERATURE: 97 F | BODY MASS INDEX: 26.07 KG/M2 | SYSTOLIC BLOOD PRESSURE: 162 MMHG | OXYGEN SATURATION: 97 % | RESPIRATION RATE: 18 BRPM | WEIGHT: 172 LBS | DIASTOLIC BLOOD PRESSURE: 91 MMHG

## 2022-07-20 DIAGNOSIS — R05.9 COUGH: ICD-10-CM

## 2022-07-20 DIAGNOSIS — U07.1 COVID-19 VIRUS DETECTED: Primary | ICD-10-CM

## 2022-07-20 DIAGNOSIS — U07.1 LAB TEST POSITIVE FOR DETECTION OF COVID-19 VIRUS: ICD-10-CM

## 2022-07-20 LAB
CTP QC/QA: YES
SARS-COV-2 RDRP RESP QL NAA+PROBE: POSITIVE

## 2022-07-20 PROCEDURE — 99214 OFFICE O/P EST MOD 30 MIN: CPT | Mod: CR,S$GLB,, | Performed by: NURSE PRACTITIONER

## 2022-07-20 PROCEDURE — U0002: ICD-10-PCS | Mod: QW,CR,S$GLB, | Performed by: NURSE PRACTITIONER

## 2022-07-20 PROCEDURE — 99214 PR OFFICE/OUTPT VISIT, EST, LEVL IV, 30-39 MIN: ICD-10-PCS | Mod: CR,S$GLB,, | Performed by: NURSE PRACTITIONER

## 2022-07-20 PROCEDURE — U0002 COVID-19 LAB TEST NON-CDC: HCPCS | Mod: QW,CR,S$GLB, | Performed by: NURSE PRACTITIONER

## 2022-07-20 NOTE — PROGRESS NOTES
"Subjective:       Patient ID: Nilo Hoover is a 67 y.o. male.    Vitals:  height is 5' 8" (1.727 m) and weight is 78 kg (172 lb). His oral temperature is 97.2 °F (36.2 °C). His blood pressure is 162/91 (abnormal) and his pulse is 71. His respiration is 18 and oxygen saturation is 97%.     Chief Complaint: Cough    Cough  This is a new problem. The current episode started in the past 7 days (2 days). The problem has been gradually worsening. The cough is non-productive. Associated symptoms include postnasal drip. Pertinent negatives include no chest pain, chills, ear congestion, ear pain, fever, headaches, heartburn, hemoptysis, myalgias, nasal congestion, rash, rhinorrhea, sore throat, shortness of breath, sweats, weight loss or wheezing. The symptoms are aggravated by lying down. Treatments tried: Aspirin. The treatment provided mild relief. There is no history of asthma, bronchiectasis, bronchitis, COPD, emphysema, environmental allergies or pneumonia.       Constitution: Positive for fatigue. Negative for chills and fever.   HENT: Positive for postnasal drip. Negative for ear pain and sore throat.    Cardiovascular: Negative for chest pain.   Respiratory: Positive for cough. Negative for bloody sputum, shortness of breath and wheezing.    Gastrointestinal: Negative for heartburn.   Musculoskeletal: Negative for muscle ache.   Skin: Negative for rash.   Allergic/Immunologic: Negative for environmental allergies.   Neurological: Negative for headaches.       Objective:      Physical Exam   Constitutional: No distress.   HENT:   Head: Normocephalic.   Pulmonary/Chest: Effort normal. No respiratory distress.   Abdominal: Normal appearance.   Neurological: He is alert.   Nursing note and vitals reviewed.        Results for orders placed or performed in visit on 07/20/22   POCT COVID-19 Rapid Screening   Result Value Ref Range    POC Rapid COVID Positive (A) Negative     Acceptable Yes  "     Assessment:       1. COVID-19 virus detected    2. Cough    3. Lab test positive for detection of COVID-19 virus          Plan:     Covid risk score 4   Discussed risks and benefits of Paxlovid  Pt will take Rx and read over information sheet and decide if its something he wishes to take.   Pt verified medications.Pt denies taking Flomax at this time  Drug to drug interaction checked with Paxlovid. Pt can proceed per drug to drug interaction.    COVID-19 virus detected  -     nirmatrelvir-ritonavir 300 mg (150 mg x 2)-100 mg copackaged tablets (EUA); Take 3 tablets by mouth 2 (two) times daily for 5 days. Each dose contains 2 nirmatrelvir (pink tablets) and 1 ritonavir (white tablet). Take all 3 tablets together  Dispense: 30 tablet; Refill: 0    Cough  -     POCT COVID-19 Rapid Screening    Lab test positive for detection of COVID-19 virus                 Patient Instructions     PLEASE READ YOUR DISCHARGE INSTRUCTIONS ENTIRELY AS IT CONTAINS IMPORTANT INFORMATION.    Patient had covid testing done today.  Discussed corona virus precautions and reviewed CDC FAC; printed a copy for patient.  I discussed to continue to monitor their symptoms. Discussed that if their symptoms persist or worsen to seek re-evaluation. Clinic vs. ER precautions were given.  Patient verbalized understanding and agreed with the entire plan of care.    If Negative and no direct exposure: symptom free without fever reducing meds in 24 hours - can go back to work in 24 hours with surgical mask for 10-14 days.    If Positive and significantly symptomatic: improved symptoms, no fever without fever reducing meds for 24 hours- have to be out for a minimum of 10 days passed from + test  with improvements in symptoms. MAY COME OUT OF QUARANTINE ON DAY 11.      If you tested positive and have symptoms, you must isolate for 5 days starting on the day of your first symptoms  OR day of the positive test if you are asymptomatic. After 5 days (ON DAY  #6), if your symptoms have improved and you have not had fever on day 5, you can return to the community on day #6- NO TESTING REQUIRED to return to community! If no fever without fever reducing meds for 24 hours, before coming out of quarantine. After your 5 days of isolation are completed, the CDC recommends strict mask use for the first 5 days (day #6-10) that you come out of isolation.  MAY COME OUT OF QUARANTINE ON DAY#6 DATE** BUT CONTINUE STRICT MASKS FOR ANOTHER 5 DAYS. QUARANTINE START DATE**    This is the most important part, both the CDC and the [facility] emphasize that you do not test out of isolation. In fact, we do not retest if you were positive in the last 90 days.           - Reviewed radiographs and all diagnostic testing with patient/family.    - Rest.  Drink plenty of fluids.    - Tylenol OR anti-inflammatory (NSAIDs, ibuprofen, aleve, motrin) as directed as needed for fever/pain.  For Tylenol, do not exceed 3000 mg/ day. If no contraindication or allergies.    - continue albuterol inhaler as needed for shortness of breath/wheezing  - do not operate machinery when taking cough syrup or pain meds as they may cause drowsiness.  - take Tessalon as needed for cough suppression. Take cough syrup as needed for cough during at night.     - If you were prescribed antibiotics, please take them to completion. Please supplement with OTC probiotics and yogurt.  Contact clinic if develop profuse diarrhea and weakness.  - If you are female and on birth control pills - please use additional methods of contraception to prevent pregnancy while on antibiotics and for one cycle after.     -Below are suggestions for symptomatic relief:              -Salt water gargles to soothe throat pain.              -Chloroseptic spray also helps to numb throat pain.              -Nasal saline spray reduces inflammation and dryness.              -Warm face compresses to help with facial sinus pain/pressure.              -Vicks vapor  rub at night.              -Astelin NASAL SPRAY twice day for nasal/sinus congestion   **may also supplement with 2nd OTC nasal spray to help with inflammation and congestion. Wean to off when you nose becomes to dry or bleed. Also use nasal saline twice a day to help with dryness.               -Flonase OTC or Nasacort OTC  once a day for nasal/sinus congestion. DON'T USE IF YOU HAVE GLAUCOMA. CHECK WITH YOUR PHARMACIST/PHYSICIAN.              -Simple foods like chicken noodle soup.              -Mucinex DM (ANY COUGH EXPECTORANT) for cough or chest congestion with mucus during the day time. Delsym or robitussin (ANY COUGH SUPPRESSANT) helps with coughing at night. Mucinex-D if you have chest congestion or sputum (caution if history of high blood pressure or palpitations).              -Zyrtec/Claritin/xyzal during the day time  & Benadryl at night (only if severe runny nose) may help with allergies and runny nose. Add decongestant if you have nasal/sinus congestion/sinus pressure/ear fullness sensation. (see below)              -may take OTC meclizine as needed for dizziness or nausea.     Caution with use of Decongestant meds:  -If you DO NOT have Hypertension or any history of palpitations, it is ok to take over the counter Sudafed or Mucinex D or Allegra-D or Claritin-D or Zyrtec-D.  -If you do take one of the above, it is ok to combine that with plain over the counter Mucinex or Allegra or Claritin or Zyrtec. If, for example, you are taking Zyrtec -D, you can combine that with Mucinex, but not Mucinex-D.  If you are taking Mucinex-D, you can combine that with plain Allegra or Claritin or Zyrtec.     -Do not combine pseudophed or phenylephrine with any other brand allergy-D for DECONGESTANT.   -Or vice versa, you can you take plain allergy medications (allegra/claritin/zyrtec with NO Decongestant) and ADD OTC pseudophed or phenelyphrine 2-3 times a day. Avoid taking decongestant late at night or with caffeine  as it can keep you up or cause jittery feeling.    -If you DO have Hypertension , anxiety, or palpitations, it is safe to take Coricidin HBP for relief of sinus symptoms.      For your GI symptoms:  -Use gatorade/pedialyte or rehydration packets to help stay hydrated. Vitamin water and plain water do not contain rehydrating electrolytes.  -Increase clear liquids (water, gatorade, pedialyte, broths, jello, etc) Hold off on solids for 12-18 hours. Then advance to BRAT diet (banana, rice, applesauce, tea, toast/crackers), then advance further as tolerated. Avoid spicy or fatty foods.   -May take Emitrol OTC as needed for nausea.   -Use Peptobismol or Immodium to help alleviate your diarrhea symptoms.   -Take mylanta or simethicone for bloating or gas pain.   -Take pepcid or omeprazole if you have heartburn or reflux sensation.  -Avoid imodium unless you have more than 6 loose stools in 24 hours. Take 1 dose and monitor to see if you can repeat AS IT WILL CAUSE CONSTIPATION.  -Wash hands frequently while sick. Avoid ibuprofen or other NSAIDS until you are well.   -Please go to the ER if you experience worsening abdominal pain, blood in your vomit or stool, high fever, dizziness, fainting, swelling of your abdomen, inability to pass gas or stool, or inability to urinate.         -You must understand that you've received an Urgent Care treatment only and that you may be released before all your medical problems are known or treated. You, the patient, will arrange for follow up care as instructed. Please arrange follow up with your primary medical clinic within 2-5 days if your signs and symptoms have not resolved or worsen.     - Follow up with your PCP or specialty clinic as directed.  You can call [phone] or [phone] to schedule an appointment with the appropriate provider.  Schedule [facility] is open Mon-Friday 8-5pm (excluded holidays).    - If your condition worsens or fails to improve we recommend that you  receive another evaluation at the emergency room immediately or contact your primary medical clinic to discuss your concerns.            Prevention steps for patients with confirmed or suspected COVID-19   Stay home and stay away from family members and friends. The CDC says, you can leave home after these three things have happened: 1) You have had no fever for at least 24 hours (that is one full day of no fever without the use of medicine that reduces fevers) 2) AND other symptoms have improved (for example, when your cough or shortness of breath have improved) 3) AND at least 10 days have passed since your symptoms first appeared OR after 7-10 days passed from first positive test.   Separate yourself from other people and animals in your home.   Call ahead before visiting your doctor.   Wear a facemask.   Cover your coughs and sneezes.   Wash your hands often with soap and water; hand  can be used, too.   Avoid sharing personal household items.   Wipe down surfaces used daily.   Monitor your symptoms. Seek prompt medical attention if your illness is worsening (e.g., difficulty breathing).    Before seeking care, call your healthcare provider.   If you have a medical emergency and need to call 911, notify the dispatch personnel that you have, or are being evaluated for COVID-19. If possible, put on a facemask before emergency medical services arrive.        Recommended precautions for household members, intimate partners, and caregivers in a home setting of a patient with symptomatic laboratory-confirmed COVID-19 or a patient under investigation.  Household members, intimate partners, and caregivers in the home setting awaiting tests results have close contact with a person with symptomatic, laboratory-confirmed COVID-19 or a person under investigation. Close contacts should monitor their health; they should call their provider right away if they develop symptoms suggestive of COVID-19 (e.g.,  fever, cough, shortness of breath).    Close contacts should also follow these recommendations:   Make sure that you understand and can help the patient follow their provider's instructions for medication(s) and care. You should help the patient with basic needs in the home and provide support for getting groceries, prescriptions, and other personal needs.   Monitor the patient's symptoms. If the patient is getting sicker, call his or her healthcare provider and tell them that the patient has laboratory-confirmed COVID-19. If the patient has a medical emergency and you need to call 911, notify the dispatch personnel that the patient has, or is being evaluated for COVID-19.   Household members should stay in another room or be  from the patient. Household members should use a separate bedroom and bathroom, if available.   Prohibit visitors.   Household members should care for any pets in the home.   Make sure that shared spaces in the home have good air flow, such as by an air conditioner or an opened window, weather permitting.   Perform hand hygiene frequently. Wash your hands often with soap and water for at least 20 seconds or use an alcohol-based hand  (that contains > 60% alcohol) covering all surfaces of your hands and rubbing them together until they feel dry. Soap and water should be used preferentially.   Avoid touching your eyes, nose, and mouth.   The patient should wear a facemask. If the patient is not able to wear a facemask (for example, because it causes trouble breathing), caregivers should wear a mask when they are in the same room as the patient.   Wear a disposable facemask and gloves when you touch or have contact with the patient's blood, stool, or body fluids, such as saliva, sputum, nasal mucus, vomit, urine.  o Throw out disposable facemasks and gloves after using them. Do not reuse.  o When removing personal protective equipment, first remove and dispose of  gloves. Then, immediately clean your hands with soap and water or alcohol-based hand . Next, remove and dispose of facemask, and immediately clean your hands again with soap and water or alcohol-based hand .   You should not share dishes, drinking glasses, cups, eating utensils, towels, bedding, or other items with the patient. After the patient uses these items, you should wash them thoroughly (see below Wash laundry thoroughly).   Clean all high-touch surfaces, such as counters, tabletops, doorknobs, bathroom fixtures, toilets, phones, keyboards, tablets, and bedside tables, every day. Also, clean any surfaces that may have blood, stool, or body fluids on them.   Use a household cleaning spray or wipe, according to the label instructions. Labels contain instructions for safe and effective use of the cleaning product including precautions you should take when applying the product, such as wearing gloves and making sure you have good ventilation during use of the product.   Wash laundry thoroughly.  o Immediately remove and wash clothes or bedding that have blood, stool, or body fluids on them.  o Wear disposable gloves while handling soiled items and keep soiled items away from your body. Clean your hands (with soap and water or an alcohol-based hand ) immediately after removing your gloves.  o Read and follow directions on labels of laundry or clothing items and detergent. In general, using a normal laundry detergent according to washing machine instructions and dry thoroughly using the warmest temperatures recommended on the clothing label.   Place all used disposable gloves, facemasks, and other contaminated items in a lined container before disposing of them with other household waste. Clean your hands (with soap and water or an alcohol-based hand ) immediately after handling these items. Soap and water should be used preferentially if hands are visibly  dirty.   Discuss any additional questions with your state or local health department or healthcare provider. Check available hours when contacting your local health department.    For more information see CDC link below.      https://www.cdc.gov/coronavirus/2019-ncov/hcp/guidance-prevent-spread.html#precautions        Sources:  [facility], [place] [facility]          Instructions for Home Care of Patients and Caretakers with Coronavirus Disease 2019   Limit visitors to the home.  Older persons and those that have chronic medical conditions such as diabetes, lung and heart disease are at increased risk for illness.    If possible, patients should use a separate bedroom while recovering. Caregivers and household members should avoid prolonged contact with the patient which means to stay 6 feet away and avoid contact with cough droplets.  When close contact is necessary, wash your hands before and immediately after contact.    Perform hand hygiene frequently. Wash your hands often with soap and water for at least 20 seconds or use an alcohol-based hand , covering all surfaces of your hands and rubbing them together until they feel dry.    Avoid touching your eyes, nose, and mouth with unwashed hands.   Avoid sharing household items with the patient. You should not share dishes, drinking glasses, cups, eating utensils, towels, bedding, or other items. After the patient uses these items, you should wash them thoroughly.   Wash laundry thoroughly.   o Immediately remove and wash clothes or bedding that have blood, stool, or body fluids on them.   Clean all high-touch surfaces, such as counters, tabletops, doorknobs, bathroom fixtures, toilets, phones, keyboards, tablets, and bedside tables, every day.   o Use a household cleaning spray or wipe, according to the label instructions. Labels contain instructions for safe and effective use of the cleaning product including precautions you  should take when applying the product, such as wearing gloves and making sure you have good ventilation during use of the product.    For more information see CDC link below.      https://www.cdc.gov/coronavirus/2019-ncov/hcp/guidance-prevent-spread.html#precautions               If your symptoms worsen or if you have any other concerns, please contact [facility] at [phone].

## 2022-07-20 NOTE — PATIENT INSTRUCTIONS
PLEASE READ YOUR DISCHARGE INSTRUCTIONS ENTIRELY AS IT CONTAINS IMPORTANT INFORMATION.    Patient had covid testing done today.  Discussed corona virus precautions and reviewed CDC FAC; printed a copy for patient.  I discussed to continue to monitor their symptoms. Discussed that if their symptoms persist or worsen to seek re-evaluation. Clinic vs. ER precautions were given.  Patient verbalized understanding and agreed with the entire plan of care.    If Negative and no direct exposure: symptom free without fever reducing meds in 24 hours - can go back to work in 24 hours with surgical mask for 10-14 days.    If Positive and significantly symptomatic: improved symptoms, no fever without fever reducing meds for 24 hours- have to be out for a minimum of 10 days passed from + test  with improvements in symptoms. MAY COME OUT OF QUARANTINE ON DAY 11.      If you tested positive and have symptoms, you must isolate for 5 days starting on the day of your first symptoms  OR day of the positive test if you are asymptomatic. After 5 days (ON DAY #6), if your symptoms have improved and you have not had fever on day 5, you can return to the community on day #6- NO TESTING REQUIRED to return to community! If no fever without fever reducing meds for 24 hours, before coming out of quarantine. After your 5 days of isolation are completed, the CDC recommends strict mask use for the first 5 days (day #6-10) that you come out of isolation.  MAY COME OUT OF QUARANTINE ON DAY#6 DATE** BUT CONTINUE STRICT MASKS FOR ANOTHER 5 DAYS. QUARANTINE START DATE**    This is the most important part, both the CDC and the [facility] emphasize that you do not test out of isolation. In fact, we do not retest if you were positive in the last 90 days.           - Reviewed radiographs and all diagnostic testing with patient/family.    - Rest.  Drink plenty of fluids.    - Tylenol OR anti-inflammatory (NSAIDs, ibuprofen, aleve, motrin) as directed as needed  for fever/pain.  For Tylenol, do not exceed 3000 mg/ day. If no contraindication or allergies.    - continue albuterol inhaler as needed for shortness of breath/wheezing  - do not operate machinery when taking cough syrup or pain meds as they may cause drowsiness.  - take Tessalon as needed for cough suppression. Take cough syrup as needed for cough during at night.     - If you were prescribed antibiotics, please take them to completion. Please supplement with OTC probiotics and yogurt.  Contact clinic if develop profuse diarrhea and weakness.  - If you are female and on birth control pills - please use additional methods of contraception to prevent pregnancy while on antibiotics and for one cycle after.     -Below are suggestions for symptomatic relief:              -Salt water gargles to soothe throat pain.              -Chloroseptic spray also helps to numb throat pain.              -Nasal saline spray reduces inflammation and dryness.              -Warm face compresses to help with facial sinus pain/pressure.              -Vicks vapor rub at night.              -Astelin NASAL SPRAY twice day for nasal/sinus congestion   **may also supplement with 2nd OTC nasal spray to help with inflammation and congestion. Wean to off when you nose becomes to dry or bleed. Also use nasal saline twice a day to help with dryness.               -Flonase OTC or Nasacort OTC  once a day for nasal/sinus congestion. DON'T USE IF YOU HAVE GLAUCOMA. CHECK WITH YOUR PHARMACIST/PHYSICIAN.              -Simple foods like chicken noodle soup.              -Mucinex DM (ANY COUGH EXPECTORANT) for cough or chest congestion with mucus during the day time. Delsym or robitussin (ANY COUGH SUPPRESSANT) helps with coughing at night. Mucinex-D if you have chest congestion or sputum (caution if history of high blood pressure or palpitations).              -Zyrtec/Claritin/xyzal during the day time  & Benadryl at night (only if severe runny nose) may  help with allergies and runny nose. Add decongestant if you have nasal/sinus congestion/sinus pressure/ear fullness sensation. (see below)              -may take OTC meclizine as needed for dizziness or nausea.     Caution with use of Decongestant meds:  -If you DO NOT have Hypertension or any history of palpitations, it is ok to take over the counter Sudafed or Mucinex D or Allegra-D or Claritin-D or Zyrtec-D.  -If you do take one of the above, it is ok to combine that with plain over the counter Mucinex or Allegra or Claritin or Zyrtec. If, for example, you are taking Zyrtec -D, you can combine that with Mucinex, but not Mucinex-D.  If you are taking Mucinex-D, you can combine that with plain Allegra or Claritin or Zyrtec.     -Do not combine pseudophed or phenylephrine with any other brand allergy-D for DECONGESTANT.   -Or vice versa, you can you take plain allergy medications (allegra/claritin/zyrtec with NO Decongestant) and ADD OTC pseudophed or phenelyphrine 2-3 times a day. Avoid taking decongestant late at night or with caffeine as it can keep you up or cause jittery feeling.    -If you DO have Hypertension , anxiety, or palpitations, it is safe to take Coricidin HBP for relief of sinus symptoms.      For your GI symptoms:  -Use gatorade/pedialyte or rehydration packets to help stay hydrated. Vitamin water and plain water do not contain rehydrating electrolytes.  -Increase clear liquids (water, gatorade, pedialyte, broths, jello, etc) Hold off on solids for 12-18 hours. Then advance to BRAT diet (banana, rice, applesauce, tea, toast/crackers), then advance further as tolerated. Avoid spicy or fatty foods.   -May take Emitrol OTC as needed for nausea.   -Use Peptobismol or Immodium to help alleviate your diarrhea symptoms.   -Take mylanta or simethicone for bloating or gas pain.   -Take pepcid or omeprazole if you have heartburn or reflux sensation.  -Avoid imodium unless you have more than 6 loose stools in  24 hours. Take 1 dose and monitor to see if you can repeat AS IT WILL CAUSE CONSTIPATION.  -Wash hands frequently while sick. Avoid ibuprofen or other NSAIDS until you are well.   -Please go to the ER if you experience worsening abdominal pain, blood in your vomit or stool, high fever, dizziness, fainting, swelling of your abdomen, inability to pass gas or stool, or inability to urinate.         -You must understand that you've received an Urgent Care treatment only and that you may be released before all your medical problems are known or treated. You, the patient, will arrange for follow up care as instructed. Please arrange follow up with your primary medical clinic within 2-5 days if your signs and symptoms have not resolved or worsen.     - Follow up with your PCP or specialty clinic as directed.  You can call [phone] or [phone] to schedule an appointment with the appropriate provider.  [facility] is open Mon-Friday 8-5pm (excluded holidays).    - If your condition worsens or fails to improve we recommend that you receive another evaluation at the emergency room immediately or contact your primary medical clinic to discuss your concerns.            Prevention steps for patients with confirmed or suspected COVID-19  Stay home and stay away from family members and friends. The CDC says, you can leave home after these three things have happened: 1) You have had no fever for at least 24 hours (that is one full day of no fever without the use of medicine that reduces fevers) 2) AND other symptoms have improved (for example, when your cough or shortness of breath have improved) 3) AND at least 10 days have passed since your symptoms first appeared OR after 7-10 days passed from first positive test.  Separate yourself from other people and animals in your home.  Call ahead before visiting your doctor.  Wear a facemask.  Cover your coughs and sneezes.  Wash your hands often with soap and water; hand   can be used, too.  Avoid sharing personal household items.  Wipe down surfaces used daily.  Monitor your symptoms. Seek prompt medical attention if your illness is worsening (e.g., difficulty breathing).   Before seeking care, call your healthcare provider.  If you have a medical emergency and need to call 911, notify the dispatch personnel that you have, or are being evaluated for COVID-19. If possible, put on a facemask before emergency medical services arrive.        Recommended precautions for household members, intimate partners, and caregivers in a home setting of a patient with symptomatic laboratory-confirmed COVID-19 or a patient under investigation.  Household members, intimate partners, and caregivers in the home setting awaiting tests results have close contact with a person with symptomatic, laboratory-confirmed COVID-19 or a person under investigation. Close contacts should monitor their health; they should call their provider right away if they develop symptoms suggestive of COVID-19 (e.g., fever, cough, shortness of breath).    Close contacts should also follow these recommendations:  Make sure that you understand and can help the patient follow their provider's instructions for medication(s) and care. You should help the patient with basic needs in the home and provide support for getting groceries, prescriptions, and other personal needs.  Monitor the patient's symptoms. If the patient is getting sicker, call his or her healthcare provider and tell them that the patient has laboratory-confirmed COVID-19. If the patient has a medical emergency and you need to call 911, notify the dispatch personnel that the patient has, or is being evaluated for COVID-19.  Household members should stay in another room or be  from the patient. Household members should use a separate bedroom and bathroom, if available.  Prohibit visitors.  Household members should care for any pets in the  home.  Make sure that shared spaces in the home have good air flow, such as by an air conditioner or an opened window, weather permitting.  Perform hand hygiene frequently. Wash your hands often with soap and water for at least 20 seconds or use an alcohol-based hand  (that contains > 60% alcohol) covering all surfaces of your hands and rubbing them together until they feel dry. Soap and water should be used preferentially.  Avoid touching your eyes, nose, and mouth.  The patient should wear a facemask. If the patient is not able to wear a facemask (for example, because it causes trouble breathing), caregivers should wear a mask when they are in the same room as the patient.  Wear a disposable facemask and gloves when you touch or have contact with the patient's blood, stool, or body fluids, such as saliva, sputum, nasal mucus, vomit, urine.  Throw out disposable facemasks and gloves after using them. Do not reuse.  When removing personal protective equipment, first remove and dispose of gloves. Then, immediately clean your hands with soap and water or alcohol-based hand . Next, remove and dispose of facemask, and immediately clean your hands again with soap and water or alcohol-based hand .  You should not share dishes, drinking glasses, cups, eating utensils, towels, bedding, or other items with the patient. After the patient uses these items, you should wash them thoroughly (see below Wash laundry thoroughly).  Clean all high-touch surfaces, such as counters, tabletops, doorknobs, bathroom fixtures, toilets, phones, keyboards, tablets, and bedside tables, every day. Also, clean any surfaces that may have blood, stool, or body fluids on them.  Use a household cleaning spray or wipe, according to the label instructions. Labels contain instructions for safe and effective use of the cleaning product including precautions you should take when applying the product, such as wearing gloves  and making sure you have good ventilation during use of the product.  Wash laundry thoroughly.  Immediately remove and wash clothes or bedding that have blood, stool, or body fluids on them.  Wear disposable gloves while handling soiled items and keep soiled items away from your body. Clean your hands (with soap and water or an alcohol-based hand ) immediately after removing your gloves.  Read and follow directions on labels of laundry or clothing items and detergent. In general, using a normal laundry detergent according to washing machine instructions and dry thoroughly using the warmest temperatures recommended on the clothing label.  Place all used disposable gloves, facemasks, and other contaminated items in a lined container before disposing of them with other household waste. Clean your hands (with soap and water or an alcohol-based hand ) immediately after handling these items. Soap and water should be used preferentially if hands are visibly dirty.  Discuss any additional questions with your state or local health department or healthcare provider. Check available hours when contacting your local health department.    For more information see CDC link below.      https://www.cdc.gov/coronavirus/2019-ncov/hcp/guidance-prevent-spread.html#precautions        Sources:  [facility], [place] [facility]          Instructions for Home Care of Patients and Caretakers with Coronavirus Disease 2019  Limit visitors to the home.  Older persons and those that have chronic medical conditions such as diabetes, lung and heart disease are at increased risk for illness.   If possible, patients should use a separate bedroom while recovering. Caregivers and household members should avoid prolonged contact with the patient which means to stay 6 feet away and avoid contact with cough droplets.  When close contact is necessary, wash your hands before and immediately after contact.   Perform hand  hygiene frequently. Wash your hands often with soap and water for at least 20 seconds or use an alcohol-based hand , covering all surfaces of your hands and rubbing them together until they feel dry.   Avoid touching your eyes, nose, and mouth with unwashed hands.  Avoid sharing household items with the patient. You should not share dishes, drinking glasses, cups, eating utensils, towels, bedding, or other items. After the patient uses these items, you should wash them thoroughly.  Wash laundry thoroughly.   Immediately remove and wash clothes or bedding that have blood, stool, or body fluids on them.  Clean all high-touch surfaces, such as counters, tabletops, doorknobs, bathroom fixtures, toilets, phones, keyboards, tablets, and bedside tables, every day.   Use a household cleaning spray or wipe, according to the label instructions. Labels contain instructions for safe and effective use of the cleaning product including precautions you should take when applying the product, such as wearing gloves and making sure you have good ventilation during use of the product.    For more information see CDC link below.      https://www.cdc.gov/coronavirus/2019-ncov/hcp/guidance-prevent-spread.html#precautions               If your symptoms worsen or if you have any other concerns, please contact [facility] at [phone].

## 2022-08-11 ENCOUNTER — OFFICE VISIT (OUTPATIENT)
Dept: URGENT CARE | Facility: CLINIC | Age: 67
End: 2022-08-11
Payer: MEDICARE

## 2022-08-11 VITALS
HEIGHT: 68 IN | TEMPERATURE: 98 F | OXYGEN SATURATION: 99 % | WEIGHT: 172 LBS | DIASTOLIC BLOOD PRESSURE: 85 MMHG | RESPIRATION RATE: 16 BRPM | BODY MASS INDEX: 26.07 KG/M2 | SYSTOLIC BLOOD PRESSURE: 145 MMHG | HEART RATE: 82 BPM

## 2022-08-11 DIAGNOSIS — J44.1 COPD WITH EXACERBATION: Primary | ICD-10-CM

## 2022-08-11 DIAGNOSIS — Z86.16 PERSONAL HISTORY OF COVID-19: ICD-10-CM

## 2022-08-11 DIAGNOSIS — R09.82 POST-NASAL DRIP: ICD-10-CM

## 2022-08-11 LAB
CTP QC/QA: YES
SARS-COV-2 RDRP RESP QL NAA+PROBE: NEGATIVE

## 2022-08-11 PROCEDURE — 99214 OFFICE O/P EST MOD 30 MIN: CPT | Mod: S$GLB,,, | Performed by: STUDENT IN AN ORGANIZED HEALTH CARE EDUCATION/TRAINING PROGRAM

## 2022-08-11 PROCEDURE — U0002: ICD-10-PCS | Mod: QW,CR,S$GLB, | Performed by: STUDENT IN AN ORGANIZED HEALTH CARE EDUCATION/TRAINING PROGRAM

## 2022-08-11 PROCEDURE — U0002 COVID-19 LAB TEST NON-CDC: HCPCS | Mod: QW,CR,S$GLB, | Performed by: STUDENT IN AN ORGANIZED HEALTH CARE EDUCATION/TRAINING PROGRAM

## 2022-08-11 PROCEDURE — 99214 PR OFFICE/OUTPT VISIT, EST, LEVL IV, 30-39 MIN: ICD-10-PCS | Mod: S$GLB,,, | Performed by: STUDENT IN AN ORGANIZED HEALTH CARE EDUCATION/TRAINING PROGRAM

## 2022-08-11 RX ORDER — IPRATROPIUM BROMIDE 42 UG/1
2 SPRAY, METERED NASAL 3 TIMES DAILY
Qty: 30 ML | Refills: 0 | Status: SHIPPED | OUTPATIENT
Start: 2022-08-11

## 2022-08-11 RX ORDER — BENZONATATE 100 MG/1
200 CAPSULE ORAL 3 TIMES DAILY PRN
Qty: 30 CAPSULE | Refills: 0 | Status: SHIPPED | OUTPATIENT
Start: 2022-08-11

## 2022-08-11 RX ORDER — PREDNISONE 20 MG/1
20 TABLET ORAL 2 TIMES DAILY
Qty: 10 TABLET | Refills: 0 | Status: SHIPPED | OUTPATIENT
Start: 2022-08-11

## 2022-08-11 NOTE — LETTER
[facility]  [address] [place] [address]  Phone: [phone]  Fax: [phone] August 11, 2022    Nilo Hoover  [address] [place] [id number]      To Whom It May Concern:    Nilo Hoover is unable to participate in jury duty due to recent COVID-19 diagnosis and COPD with acute exacerbation. The patient has been advised to follow up with his pulmonologist for monitoring until resolution of this problem.      Sincerely,      Eileen Goodwin MD

## 2022-08-11 NOTE — PROGRESS NOTES
"Subjective:       Patient ID: Nilo Hoover is a 67 y.o. male.    Vitals:  height is 5' 8" (1.727 m) and weight is 78 kg (172 lb). His temperature is 97.7 °F (36.5 °C). His blood pressure is 145/85 (abnormal) and his pulse is 82. His respiration is 16 and oxygen saturation is 99%.     Chief Complaint: Nasal Congestion    Pt is 66 yo male that is currently experiencing symptoms for a recent covid-19 diagnosis.   Pt states that the cough and congestion improved some after taking Paxlovid but never completely resolved. His fever, chills, and bodyaches all resolved.   Pt also states that he has COPD and is having mild shortness of breath. Using his inhalers as prescribed, including prn rescue inhaler. Notices more SOB with exertional activities like cutting the grass. No chest pain. Mild tightness when SOB.   He is also having diarrhea, post nasal drip that he coughs up.       Shortness of Breath  This is a recurrent problem. The current episode started 1 to 4 weeks ago. The problem occurs constantly. Pertinent negatives include no chest pain, fever, hemoptysis, vomiting or wheezing. Nothing aggravates the symptoms. Associated symptoms comments: Coughing with mucus, diarrhea. Risk factors include smoking. Treatments tried: paxlovid. The treatment provided mild relief. His past medical history is significant for COPD.       Constitution: Negative for chills and fever.   HENT: Positive for postnasal drip. Negative for nosebleeds.    Cardiovascular: Negative for chest pain.   Respiratory: Positive for chest tightness (intermittent, chronic), cough and shortness of breath. Negative for bloody sputum and wheezing.    Gastrointestinal: Positive for diarrhea. Negative for nausea and vomiting.       Objective:      Physical Exam   Constitutional: He is oriented to person, place, and time. He does not appear ill. No distress.   HENT:   Head: Normocephalic.   Pulmonary/Chest: Effort normal and breath sounds normal. No " respiratory distress. He has no wheezes. He has no rhonchi.   Neurological: He is alert and oriented to person, place, and time. Coordination normal.   Skin: Skin is warm and dry.   Psychiatric: His behavior is normal. Mood normal.   Nursing note and vitals reviewed.        Assessment:       1. COPD with exacerbation    2. Personal history of COVID-19    3. Post-nasal drip          Plan:         COPD with exacerbation  -     POCT COVID-19 Rapid Screening  -     predniSONE (DELTASONE) 20 MG tablet; Take 1 tablet (20 mg total) by mouth 2 (two) times daily.  Dispense: 10 tablet; Refill: 0  -     benzonatate (TESSALON) 100 MG capsule; Take 2 capsules (200 mg total) by mouth 3 (three) times daily as needed for Cough.  Dispense: 30 capsule; Refill: 0    Personal history of COVID-19    Post-nasal drip  -     ipratropium (ATROVENT) 42 mcg (0.06 %) nasal spray; 2 sprays by Nasal route 3 (three) times daily.  Dispense: 30 mL; Refill: 0      Discussed post-COVID COPD exacerbation. Mild symptoms, treating with oral steroid course. Will defer antibiotic treatment for now. Continue with inhalers as directed. Ok to take pepto bismol for diarrhea. F/u with PCP. Excuse for jury duty provided.

## 2022-10-20 ENCOUNTER — LAB VISIT (OUTPATIENT)
Dept: LAB | Facility: HOSPITAL | Age: 67
End: 2022-10-20
Payer: MEDICARE

## 2022-10-20 DIAGNOSIS — K74.00 LIVER FIBROSIS: ICD-10-CM

## 2022-10-20 DIAGNOSIS — K76.0 FATTY LIVER: ICD-10-CM

## 2022-10-20 LAB
AFP SERPL-MCNC: 3.9 NG/ML (ref 0–8.4)
ALBUMIN SERPL BCP-MCNC: 3.7 G/DL (ref 3.5–5.2)
ALP SERPL-CCNC: 50 U/L (ref 55–135)
ALT SERPL W/O P-5'-P-CCNC: 16 U/L (ref 10–44)
ANION GAP SERPL CALC-SCNC: 13 MMOL/L (ref 8–16)
AST SERPL-CCNC: 26 U/L (ref 10–40)
BILIRUB SERPL-MCNC: 0.6 MG/DL (ref 0.1–1)
BUN SERPL-MCNC: 13 MG/DL (ref 8–23)
CALCIUM SERPL-MCNC: 9.1 MG/DL (ref 8.7–10.5)
CHLORIDE SERPL-SCNC: 100 MMOL/L (ref 95–110)
CO2 SERPL-SCNC: 22 MMOL/L (ref 23–29)
CREAT SERPL-MCNC: 1 MG/DL (ref 0.5–1.4)
ERYTHROCYTE [DISTWIDTH] IN BLOOD BY AUTOMATED COUNT: 12.5 % (ref 11.5–14.5)
EST. GFR  (NO RACE VARIABLE): >60 ML/MIN/1.73 M^2
FERRITIN SERPL-MCNC: 349 NG/ML (ref 20–300)
GLUCOSE SERPL-MCNC: 88 MG/DL (ref 70–110)
HCT VFR BLD AUTO: 39.2 % (ref 40–54)
HGB BLD-MCNC: 13.6 G/DL (ref 14–18)
INR PPP: 1 (ref 0.8–1.2)
IRON SERPL-MCNC: 119 UG/DL (ref 45–160)
MCH RBC QN AUTO: 35.8 PG (ref 27–31)
MCHC RBC AUTO-ENTMCNC: 34.7 G/DL (ref 32–36)
MCV RBC AUTO: 103 FL (ref 82–98)
PLATELET # BLD AUTO: 249 K/UL (ref 150–450)
PMV BLD AUTO: 9.7 FL (ref 9.2–12.9)
POTASSIUM SERPL-SCNC: 4 MMOL/L (ref 3.5–5.1)
PROT SERPL-MCNC: 7 G/DL (ref 6–8.4)
PROTHROMBIN TIME: 10.4 SEC (ref 9–12.5)
RBC # BLD AUTO: 3.8 M/UL (ref 4.6–6.2)
SATURATED IRON: 30 % (ref 20–50)
SODIUM SERPL-SCNC: 135 MMOL/L (ref 136–145)
TOTAL IRON BINDING CAPACITY: 403 UG/DL (ref 250–450)
TRANSFERRIN SERPL-MCNC: 272 MG/DL (ref 200–375)
WBC # BLD AUTO: 5.88 K/UL (ref 3.9–12.7)

## 2022-10-20 PROCEDURE — 80321 ALCOHOLS BIOMARKERS 1OR 2: CPT | Performed by: NURSE PRACTITIONER

## 2022-10-20 PROCEDURE — 82105 ALPHA-FETOPROTEIN SERUM: CPT | Performed by: NURSE PRACTITIONER

## 2022-10-20 PROCEDURE — 85027 COMPLETE CBC AUTOMATED: CPT | Performed by: NURSE PRACTITIONER

## 2022-10-20 PROCEDURE — 80053 COMPREHEN METABOLIC PANEL: CPT | Performed by: NURSE PRACTITIONER

## 2022-10-20 PROCEDURE — 36415 COLL VENOUS BLD VENIPUNCTURE: CPT | Performed by: NURSE PRACTITIONER

## 2022-10-20 PROCEDURE — 84466 ASSAY OF TRANSFERRIN: CPT | Performed by: NURSE PRACTITIONER

## 2022-10-20 PROCEDURE — 82728 ASSAY OF FERRITIN: CPT | Performed by: NURSE PRACTITIONER

## 2022-10-20 PROCEDURE — 85610 PROTHROMBIN TIME: CPT | Performed by: NURSE PRACTITIONER

## 2022-10-24 ENCOUNTER — PATIENT MESSAGE (OUTPATIENT)
Dept: HEPATOLOGY | Facility: CLINIC | Age: 67
End: 2022-10-24
Payer: COMMERCIAL

## 2022-10-26 ENCOUNTER — PATIENT MESSAGE (OUTPATIENT)
Dept: RESEARCH | Facility: HOSPITAL | Age: 67
End: 2022-10-26
Payer: COMMERCIAL

## 2022-10-27 LAB
CLINICAL BIOCHEMIST REVIEW: NORMAL
PETH 16:0/18.1 (POPETH): 347 NG/ML
PETH 16:0/18.2 (PLPETH): 249 NG/ML

## 2023-04-07 NOTE — TELEPHONE ENCOUNTER
Called pt's pharmacy and refilled his symbicort with 6 refills. I called the pt and informed him of that.   shortness of breath clear

## 2023-04-26 ENCOUNTER — PROCEDURE VISIT (OUTPATIENT)
Dept: HEPATOLOGY | Facility: CLINIC | Age: 68
End: 2023-04-26
Payer: MEDICARE

## 2023-04-26 ENCOUNTER — OFFICE VISIT (OUTPATIENT)
Dept: HEPATOLOGY | Facility: CLINIC | Age: 68
End: 2023-04-26
Payer: MEDICARE

## 2023-04-26 VITALS — BODY MASS INDEX: 25.16 KG/M2 | HEIGHT: 68 IN | WEIGHT: 166 LBS

## 2023-04-26 DIAGNOSIS — Z14.8 CARRIER OF HEMOCHROMATOSIS HFE GENE MUTATION: ICD-10-CM

## 2023-04-26 DIAGNOSIS — R79.89 ELEVATED FERRITIN: ICD-10-CM

## 2023-04-26 DIAGNOSIS — K74.01 HEPATIC FIBROSIS, EARLY FIBROSIS: ICD-10-CM

## 2023-04-26 DIAGNOSIS — I10 PRIMARY HYPERTENSION: ICD-10-CM

## 2023-04-26 DIAGNOSIS — K74.00 LIVER FIBROSIS: ICD-10-CM

## 2023-04-26 DIAGNOSIS — K76.0 FATTY LIVER: Primary | ICD-10-CM

## 2023-04-26 DIAGNOSIS — K76.0 FATTY LIVER: ICD-10-CM

## 2023-04-26 DIAGNOSIS — F10.11 HISTORY OF ALCOHOL ABUSE: ICD-10-CM

## 2023-04-26 PROCEDURE — 99214 OFFICE O/P EST MOD 30 MIN: CPT | Mod: S$PBB,,, | Performed by: NURSE PRACTITIONER

## 2023-04-26 PROCEDURE — 76981 FIBROSCAN (VIBRATION CONTROLLED TRANSIENT ELASTOGRAPHY): ICD-10-PCS | Mod: 26,S$PBB,, | Performed by: NURSE PRACTITIONER

## 2023-04-26 PROCEDURE — 99999 PR PBB SHADOW E&M-EST. PATIENT-LVL IV: ICD-10-PCS | Mod: PBBFAC,,, | Performed by: NURSE PRACTITIONER

## 2023-04-26 PROCEDURE — 99214 PR OFFICE/OUTPT VISIT, EST, LEVL IV, 30-39 MIN: ICD-10-PCS | Mod: S$PBB,,, | Performed by: NURSE PRACTITIONER

## 2023-04-26 PROCEDURE — 76981 USE PARENCHYMA: CPT | Mod: 26,S$PBB,, | Performed by: NURSE PRACTITIONER

## 2023-04-26 PROCEDURE — 91200 LIVER ELASTOGRAPHY: CPT | Mod: PBBFAC | Performed by: NURSE PRACTITIONER

## 2023-04-26 PROCEDURE — 99999 PR PBB SHADOW E&M-EST. PATIENT-LVL IV: CPT | Mod: PBBFAC,,, | Performed by: NURSE PRACTITIONER

## 2023-04-26 PROCEDURE — 99214 OFFICE O/P EST MOD 30 MIN: CPT | Mod: PBBFAC | Performed by: NURSE PRACTITIONER

## 2023-04-26 NOTE — PATIENT INSTRUCTIONS
Try Melatonin (either 5 mg or 10 mg) OR Sleep Aid (doxylamine)- don't drink on this  2. Fibroscan to look for fat or scar tissue in the liver showed >67% fat in the liver, no scar tissue this time   3. Follow up in 6 months with US and labs a few days before    There is no FDA approved therapy for fatty liver disease. Therefore, these things are important:  Limit alcohol consumption, no more than 2 serving(s) of alcohol in any day (1 serving is 5 ounces of wine, 12 ounces of beer, or 1.5 ounces of liquor) and do NOT recommend any daily alcohol use    2 maintain normal weight   3. Low carb/sugar and high protein diet (~1 g/kg/day).Try to limit your carb intake to LESS than 30-45 grams of carbs with a meal or LESS than 5-10 grams with any snack (total of any snack foods eaten during that time). Use MyFitness Pal or Lose It ignacio to add up your carbs through the day. Do NOT drink any beverages with calories or carbs (this can lead to high blood sugar and weight gain). Also, some of our patients have been very successful with weight loss using the pre made/planned meal planning services that limit calories and portion size ( The main thing to look for is low calorie, high protein, low carb)  4. Exercise, 5 days per week, 30 minutes per day, as tolerated  5. Recommend good cholesterol, blood pressure, blood sugar levels     In some people, fatty liver can progress to steatohepatitis (inflamatory fatty liver) and possibly to cirrhosis, increasing the risk for liver cancer, liver failure, and death. Therefore, the lifestyle changes are very important to decrease this risk.     Website with information about fatty liver and inflammation related to fatty liver (MORALES) = www.[email or web address]  AND [email or web address]

## 2023-04-26 NOTE — PROGRESS NOTES
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    Prior serologic workup:   Lab Results   Component Value Date    SMOOTHMUSCAB Negative 1:40 08/16/2018    AMAIFA Negative 1:40 08/16/2018    ANASCREEN Negative <1:160 08/16/2018    FERRITIN 349 (H) 10/20/2022    FESATURATED 30 10/20/2022    PETH Negative 07/30/2021    [id number] MS 08/16/2018    [id number] 117 08/16/2018    CERULOPLSM 29.0 08/16/2018    HEPBSAG Negative 08/16/2018    HEPCAB Negative 04/29/2016     Risk factors for fatty liver include heavy alcohol use     Liver fibrosis staging:  -- fibroscan in 2018 was F0, S2 (kPA 6.3, )  -- fibroscan 8/2021 noted F2, S2 (kPA 7.9, )  --fibroascan 4/2022 noted F2, S3 (kPA 9.5, )  -- fibroscan 4/2023 noted F0, S3 (kPA 4.6, )    Interval HPI: Presents today alone. Stopped alcohol completely ~ May 2021, but resumed in 2021 and now drinking heavily/daily since but then Stopped alcohol 2 weeks ago but unsure if he will remain abstinent   Did not complete labs and US before visit as scheduled     Labs done 10/2022 show normal transaminase levels (previously fluctuating labs, AST>ALT, elevated intermittently since 2016)  Platelets wnl, alk phos WNL  Synthetic liver functioning WNL    Lab Results   Component Value Date    ALT 16 10/20/2022    AST 26 10/20/2022    ALKPHOS 50 (L) 10/20/2022    BILITOT 0.6 10/20/2022    ALBUMIN 3.7 10/20/2022    INR 1.0 10/20/2022     10/20/2022     Abd U/S done 5/2022 noted no lesions, no splenomegaly     Denies family history  "of liver disease . + previous heavy Alcohol consumption, see below  Social History     Substance and Sexual Activity   Alcohol Use Yes    Comment: 1/5 of whiskey weekly, stopped completed May 2021 but resumed in 2021, drinking nightly 3-5 servings nightly, stopped mid April 2023       + Immunity to Hep A and B          Allergy and medication list reviewed and updated     PMHX:  has a past medical history of Bronchitis chronic, COPD (chronic obstructive pulmonary disease), Degenerative disc disease, Erectile dysfunction, GERD (gastroesophageal reflux disease), H/O: GI bleed, HTN (hypertension) (2/26/2013), Personal history of colonic polyps (8/9), and Seizures.    PSHX:  has a past surgical history that includes Tonsillectomy; Colonoscopy w/ polypectomy; Esophagogastroduodenoscopy; and Colonoscopy (N/A, 9/21/2018).    FAMILY HISTORY: Updated and reviewed in [facility]    SOCIAL HISTORY:   Social History     Substance and Sexual Activity   Alcohol Use Yes    Comment: 1/5 of whiskey weekly, stopped completed May 2021 but resumed in 2021, drinking nightly 3-5 servings nightly, stopped mid April 2023       Social History     Substance and Sexual Activity   Drug Use No       ROS:   GENERAL: Denies fatigue  CARDIOVASCULAR: Denies edema  GI: Denies abdominal pain  SKIN: Denies rash, itching   NEURO: Denies confusion, memory loss, or mood changes    PHYSICAL EXAM:   Friendly White male, in no acute distress; alert and oriented to person, place and time  VITALS: Ht 5' 8" (1.727 m)   Wt 75.3 kg (166 lb 0.1 oz)   BMI 25.24 kg/m²   EYES: Sclerae anicteric  GI: Soft, non-tender, non-distended. No ascites.  EXTREMITIES:  No edema.  SKIN: Warm and dry. No jaundice. No telangectasias noted. No palmar erythema.  NEURO:  No asterixis.  PSYCH:  Thought and speech pattern appropriate. Behavior normal      EDUCATION:  See instructions discussed with patient in Instructions section of the After Visit Summary     ASSESSMENT & PLAN:  67 y.o. " White male with:  1.  Fatty liver, likely related to alcohol   - Abd U/S done 5/2022 noted no lesions, no splenomegaly - will repeat with RTC  - transaminases currently WNL but intermittent elevation since 2016  - Synthetic liver function WNL  -- Previous serologic w/u in HPI  - risk factors for fatty liver disease include previously heavy alcohol use    -- Fibroscan 4/2023 noted F0, S3 - repeat yearly   -- Recommendations discussed with patient:  Limit alcohol consumption, none given scar tissue   2  Maintain normal weight  3. Low carb/sugar, high fiber and protein diet  4. Exercise, 5 days per week, 30 minutes per day, as tolerated  5. Recommend good cholesterol, blood pressure, blood sugar levels    2. Liver fibrosis previously  -- none on fibroscan today, will continue to repeat, Reinforced need to remain abstinent of alcohol completely given liver fibrosis     3. Alcohol abuse   - recommend stopping completely, pt aware, not interested in rehab referral  Social History     Substance and Sexual Activity   Alcohol Use Yes    Comment: 1/5 of whiskey weekly, stopped completed May 2021 but resumed in 2021, drinking nightly 3-5 servings nightly     4. 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 Low suspicion for clinical significant iron overload given carrier status but will continue to monitor with labs and refer to hematology as needed           Follow up in about 6 months (around 10/26/2023). with US and labs 1 week before    Orders Placed This Encounter   Procedures    US Abdomen Limited    AFP Tumor Marker    CBC Without Differential    Comprehensive Metabolic Panel    Phosphatidylethanol (PETH)    Protime-INR        Thank you for allowing me to participate in the care of KASSANDRA Urrutia    I spent a total of 30 minutes on the day of the visit.This includes face to face time and non-face to face time preparing to see the patient  (eg, review of tests), obtaining and/or reviewing separately obtained history, documenting clinical information in the electronic or other health record, independently interpreting results and communicating results to the patient/family/caregiver, and coordinating care.         CC'ed note to:

## 2023-04-26 NOTE — PROCEDURES
FibroScan (Vibration Controlled Transient Elastography)    Date/Time: 4/26/2023 9:15 AM  Performed by: Marcie Ignacio NP  Authorized by: Marcie Ignacio NP     Diagnosis:  NAFLD and Alcohol    Probe:  M    Universal Protocol: Patient's identity, procedure and site were verified, confirmatory pause was performed.  Discussed procedure including risks and potential complications.  Questions answered.  Patient verbalizes understanding and wishes to proceed with VCTE.     Procedure: After providing explanations of the procedure, patient was placed in the supine position with right arm in maximum abduction to allow optimal exposure of right lateral abdomen.  Patient was briefly assessed, Testing was performed in the mid-axillary location, 50Hz Shear Wave pulses were applied and the resulting Shear Wave and Propagation Speed detected with a 3.5 MHz ultrasonic signal, using the FibroScan probe, Skin to liver capsule distance and liver parenchyma were accessed during the entire examination with the FibroScan probe, Patient was instructed to breathe normally and to abstain from sudden movements during the procedure, allowing for random measurements of liver stiffness. At least 10 Shear Waves were produced, Individual measurements of each Shear Wave were calculated.  Patient tolerated the procedure well with no complications.  Meets discharge criteria as was dismissed.  Rates pain 0 out of 10.  Patient will follow up with ordering provider to review results.    Findings  Median liver stiffness score:  4.6  CAP Reading: dB/m:  308    IQR/med %:  13  Interpretation  Fibrosis interpretation is based on medial liver stiffness - Kilopascal (kPa).    Fibrosis Stage:  F 0-1  Steatosis interpretation is based on controlled attenuation parameter - (dB/m).    Steatosis Grade:  S3

## 2023-07-18 ENCOUNTER — PATIENT OUTREACH (OUTPATIENT)
Dept: ADMINISTRATIVE | Facility: HOSPITAL | Age: 68
End: 2023-07-18
Payer: MEDICARE

## 2023-07-18 NOTE — PROGRESS NOTES
Health Maintenance Due   Topic Date Due    TETANUS VACCINE  Never done    Shingles Vaccine (2 of 3) 12/20/2016    Colorectal Cancer Screening  09/21/2021    COVID-19 Vaccine (3 - Booster for Samira series) 03/08/2022    PROSTATE-SPECIFIC ANTIGEN  06/14/2022    LDCT Lung Screen  07/06/2023

## 2023-07-19 DIAGNOSIS — I10 ESSENTIAL HYPERTENSION: ICD-10-CM

## 2023-07-19 RX ORDER — LISINOPRIL 20 MG/1
TABLET ORAL
Qty: 90 TABLET | Refills: 0 | Status: SHIPPED | OUTPATIENT
Start: 2023-07-19 | End: 2023-10-10

## 2023-07-19 NOTE — TELEPHONE ENCOUNTER
Care Due:                  Date            Visit Type   Department     Provider  --------------------------------------------------------------------------------    Last Visit: None Found      None         None Found  Next Visit: None Scheduled  None         None Found                                                            Last  Test          Frequency    Reason                     Performed    Due Date  --------------------------------------------------------------------------------    Office Visit  12 months..  lisinopriL, tiotropium...  Not Found    Overdue    CMP.........  12 months..  lisinopriL...............  10-   10-    Health [facility] Embedded Care Due Messages. Reference number: [id number].   7/19/2023 5:28:25 AM CDT

## 2023-07-31 ENCOUNTER — TELEPHONE (OUTPATIENT)
Dept: UROLOGY | Facility: CLINIC | Age: 68
End: 2023-07-31
Payer: MEDICARE

## 2023-07-31 ENCOUNTER — PES CALL (OUTPATIENT)
Dept: ADMINISTRATIVE | Facility: CLINIC | Age: 68
End: 2023-07-31
Payer: MEDICARE

## 2023-07-31 NOTE — TELEPHONE ENCOUNTER
----- Message from Kizzy Elliott sent at 7/29/2023 12:48 PM CDT -----  Type:  RX Refill Request    Who Called: JARED VERA [[id number]]  Refill or New Rx:refill  RX Name and Strength:tamsulosin (FLOMAX) 0.4 mg Cap  How is the patient currently taking it? (ex. 1XDay):TAKE 1 CAPSULE(0.4 MG) BY MOUTH EVERY DAY  Is this a 30 day or 90 day RX:90  Preferred Pharmacy with phone number:[id number] - [place] - [address] [place] [address] [facility] [address] [place] [facility]   Phone: [phone]  Fax:  [phone]  Local or Mail Order:local  Ordering Provider:tez  Would the patient rather a call back or a response via [facility]? Call back   Best Call Back Number:[phone]  Additional Information: Patient indicates he needs to get his medication refill authorized. Patient indicates he called in his refill at the pharmacy but was told he needs to get it authorized by the provider. Patient indicates he would like this to be sent in as soon as possible. Please call patient back with further assistance dn more information if possible.

## 2023-07-31 NOTE — TELEPHONE ENCOUNTER
Pt advised that he would need an appt for medication refill. Ap[pt made and 1 month of medication called in.

## 2023-08-03 ENCOUNTER — OFFICE VISIT (OUTPATIENT)
Dept: UROLOGY | Facility: CLINIC | Age: 68
End: 2023-08-03
Payer: MEDICARE

## 2023-08-03 VITALS — WEIGHT: 166 LBS | HEIGHT: 68 IN | BODY MASS INDEX: 25.16 KG/M2

## 2023-08-03 DIAGNOSIS — N40.1 BPH WITH URINARY OBSTRUCTION: ICD-10-CM

## 2023-08-03 DIAGNOSIS — N13.8 BPH WITH URINARY OBSTRUCTION: ICD-10-CM

## 2023-08-03 DIAGNOSIS — R35.1 NOCTURIA: Primary | ICD-10-CM

## 2023-08-03 PROCEDURE — 99213 OFFICE O/P EST LOW 20 MIN: CPT | Mod: PBBFAC | Performed by: UROLOGY

## 2023-08-03 PROCEDURE — 99999 PR PBB SHADOW E&M-EST. PATIENT-LVL III: ICD-10-PCS | Mod: PBBFAC,,, | Performed by: UROLOGY

## 2023-08-03 PROCEDURE — 99213 PR OFFICE/OUTPT VISIT, EST, LEVL III, 20-29 MIN: ICD-10-PCS | Mod: S$PBB,,, | Performed by: UROLOGY

## 2023-08-03 PROCEDURE — 99999 PR PBB SHADOW E&M-EST. PATIENT-LVL III: CPT | Mod: PBBFAC,,, | Performed by: UROLOGY

## 2023-08-03 PROCEDURE — 99213 OFFICE O/P EST LOW 20 MIN: CPT | Mod: S$PBB,,, | Performed by: UROLOGY

## 2023-08-03 RX ORDER — TAMSULOSIN HYDROCHLORIDE 0.4 MG/1
0.4 CAPSULE ORAL DAILY
Qty: 30 CAPSULE | Refills: 12 | Status: SHIPPED | OUTPATIENT
Start: 2023-08-03

## 2023-08-03 NOTE — PROGRESS NOTES
Subjective:       Patient ID: Nilo Hoover is a 68 y.o. male.      HPI    68M last seen in clinic in  for nocturia.  PVR at that time was 140 mL.  He was started on flomax.      Past Surgical History:   Procedure Laterality Date    COLONOSCOPY N/A 2018    Procedure: COLONOSCOPY;  Surgeon: Minesh Garzon MD;  Location: [address]);  Service: Endoscopy;  Laterality: N/A;  3 year f/u-past due- history of colon polyps    COLONOSCOPY W/ POLYPECTOMY      ESOPHAGOGASTRODUODENOSCOPY      TONSILLECTOMY         Past Medical History:   Diagnosis Date    Bronchitis chronic     COPD (chronic obstructive pulmonary disease)     Degenerative disc disease     lumar spine     Erectile dysfunction     GERD (gastroesophageal reflux disease)     hx gastritis    H/O: GI bleed     HTN (hypertension) 2013    Personal history of colonic polyps     Seizures        Social History     Socioeconomic History    Marital status: Single   Occupational History     Employer: [employer]   Tobacco Use    Smoking status: Former     Current packs/day: 0.00     Average packs/day: 1.5 packs/day for 35.0 years (52.5 ttl pk-yrs)     Types: Cigarettes     Start date: 1974     Quit date: 2009     Years since quittin.5    Smokeless tobacco: Never   Substance and Sexual Activity    Alcohol use: Yes     Comment: 1/5 of whiskey weekly, stopped completed May 2021 but resumed in , drinking nightly 3-5 servings nightly, stopped mid 2023    Drug use: No       Family History   Problem Relation Age of Onset    Heart disease Mother     Cancer Mother         colon     COPD Father     Emphysema Father     Seizures Son     Seizures Brother     Hypertension Brother        Current Outpatient Medications   Medication Sig Dispense Refill    albuterol (PROVENTIL/VENTOLIN HFA) 90 mcg/actuation inhaler INHALE 2 PUFFS INTO THE LUNGS EVERY 6 HOURS AS NEEDED FOR WHEEZING OR SHORTNESS OF BREATH 8.5 g 6     benzonatate (TESSALON) 100 MG capsule Take 2 capsules (200 mg total) by mouth 3 (three) times daily as needed for Cough. 30 capsule 0    budesonide-formoterol 160-4.5 mcg (SYMBICORT) 160-4.5 mcg/actuation HFAA Inhale 2 puffs into the lungs every 12 hours. 10.2 g 0    INCRUSE ELLIPTA 62.5 mcg/actuation inhalation capsule INHALE 1 PUFF INTO THE LUNGS ONCE DAILY AS CONTROLLER 30 each 11    ipratropium (ATROVENT) 42 mcg (0.06 %) nasal spray 2 sprays by Nasal route 3 (three) times daily. 30 mL 0    lisinopriL (PRINIVIL,ZESTRIL) 20 MG tablet TAKE 1 TABLET(20 MG) BY MOUTH EVERY DAY 90 tablet 0    omeprazole 20 mg TbEC Take by mouth. 1 Tablet, Delayed Release (E.C.) Oral Every day      predniSONE (DELTASONE) 20 MG tablet Take 1 tablet (20 mg total) by mouth 2 (two) times daily. 10 tablet 0    SPIRIVA WITH HANDIHALER 18 mcg inhalation capsule INHALE THE CONTENTS OF 1 CAPSULE(18 MCG) INTO THE LUNGS EVERY DAY 30 capsule 11    SYMBICORT 160-4.5 mcg/actuation HFAA INHALE 2 PUFFS INTO THE LUNGS EVERY 12 HOURS 10.2 g 12    tamsulosin (FLOMAX) 0.4 mg Cap TAKE 1 CAPSULE(0.4 MG) BY MOUTH EVERY DAY 30 capsule 12    tiotropium (SPIRIVA) 18 mcg inhalation capsule Inhale 18 mcg into the lungs once daily. Controller      tiotropium (SPIRIVA) 18 mcg inhalation capsule Inhale 1 capsule (18mcg) into the lungs once daily. (Patient not taking: Reported on 7/20/2022) 30 capsule 0     No current facility-administered medications for this visit.       Review of patient's allergies indicates:  No Known Allergies     Lab Results   Component Value Date    PSA 0.65 06/14/2021    PSA 0.50 08/20/2018    PSA 0.22 04/29/2016    PSA 0.61 08/26/2014    PSA 0.61 03/07/2012    PSA 0.59 03/04/2011    PSA 0.69 10/01/2009       BMP  Lab Results   Component Value Date     (L) 10/20/2022    K 4.0 10/20/2022     10/20/2022    CO2 22 (L) 10/20/2022    BUN 13 10/20/2022    CREATININE 1.0 10/20/2022    CALCIUM 9.1 10/20/2022    ANIONGAP 13 10/20/2022     ESTGFRAFRICA >60.0 05/02/2022    EGFRNONAA >60.0 05/02/2022         Review of Systems  Objective:      Physical Exam    Assessment:       1. Nocturia        Plan:   Diagnoses and all orders for this visit:    Nocturia      - refilled flomax today   Rtc 1 yr w ignacio for jeremy

## 2023-08-17 ENCOUNTER — PATIENT MESSAGE (OUTPATIENT)
Dept: ADMINISTRATIVE | Facility: OTHER | Age: 68
End: 2023-08-17
Payer: MEDICARE

## 2023-08-22 ENCOUNTER — TELEPHONE (OUTPATIENT)
Dept: ADMINISTRATIVE | Facility: CLINIC | Age: 68
End: 2023-08-22
Payer: MEDICARE

## 2023-08-23 ENCOUNTER — OFFICE VISIT (OUTPATIENT)
Dept: INTERNAL MEDICINE | Facility: CLINIC | Age: 68
End: 2023-08-23
Payer: MEDICARE

## 2023-08-23 VITALS
HEART RATE: 75 BPM | OXYGEN SATURATION: 96 % | WEIGHT: 168.88 LBS | BODY MASS INDEX: 25.68 KG/M2 | SYSTOLIC BLOOD PRESSURE: 132 MMHG | DIASTOLIC BLOOD PRESSURE: 72 MMHG

## 2023-08-23 DIAGNOSIS — K76.0 FATTY LIVER: ICD-10-CM

## 2023-08-23 DIAGNOSIS — Z13.89 ENCOUNTER FOR SCREENING FOR OTHER DISORDER: ICD-10-CM

## 2023-08-23 DIAGNOSIS — Z87.891 PERSONAL HISTORY OF NICOTINE DEPENDENCE: ICD-10-CM

## 2023-08-23 DIAGNOSIS — R56.9 SEIZURES: ICD-10-CM

## 2023-08-23 DIAGNOSIS — J44.0 CHRONIC OBSTRUCTIVE PULMONARY DISEASE WITH ACUTE LOWER RESPIRATORY INFECTION: ICD-10-CM

## 2023-08-23 DIAGNOSIS — J43.9 PULMONARY EMPHYSEMA, UNSPECIFIED EMPHYSEMA TYPE: ICD-10-CM

## 2023-08-23 DIAGNOSIS — D69.2 PURPURA: ICD-10-CM

## 2023-08-23 DIAGNOSIS — I70.0 AORTIC ATHEROSCLEROSIS: ICD-10-CM

## 2023-08-23 DIAGNOSIS — Z00.00 ENCOUNTER FOR PREVENTIVE HEALTH EXAMINATION: Primary | ICD-10-CM

## 2023-08-23 DIAGNOSIS — F10.920: ICD-10-CM

## 2023-08-23 DIAGNOSIS — H91.90 DECREASED HEARING, UNSPECIFIED LATERALITY: ICD-10-CM

## 2023-08-23 DIAGNOSIS — I10 HYPERTENSION, UNSPECIFIED TYPE: ICD-10-CM

## 2023-08-23 PROCEDURE — G0439 PR MEDICARE ANNUAL WELLNESS SUBSEQUENT VISIT: ICD-10-PCS | Mod: ,,, | Performed by: NURSE PRACTITIONER

## 2023-08-23 PROCEDURE — G0439 PPPS, SUBSEQ VISIT: HCPCS | Mod: ,,, | Performed by: NURSE PRACTITIONER

## 2023-08-23 PROCEDURE — 99999 PR PBB SHADOW E&M-EST. PATIENT-LVL V: ICD-10-PCS | Mod: PBBFAC,,, | Performed by: NURSE PRACTITIONER

## 2023-08-23 PROCEDURE — 99215 OFFICE O/P EST HI 40 MIN: CPT | Mod: PBBFAC | Performed by: NURSE PRACTITIONER

## 2023-08-23 PROCEDURE — 99999 PR PBB SHADOW E&M-EST. PATIENT-LVL V: CPT | Mod: PBBFAC,,, | Performed by: NURSE PRACTITIONER

## 2023-08-23 NOTE — PATIENT INSTRUCTIONS
Counseling and Referral of Other Preventative  (Italic type indicates deductible and co-insurance are waived)    Patient Name: Nilo Hoover  Today's Date: 8/23/2023    Health Maintenance       Date Due Completion Date    TETANUS VACCINE Never done ---    Colorectal Cancer Screening 09/21/2021 9/21/2018    COVID-19 Vaccine (3 - Booster for Samira series) 03/08/2022 1/11/2022    PROSTATE-SPECIFIC ANTIGEN 06/14/2022 6/14/2021    LDCT Lung Screen 07/06/2023 7/6/2022    Influenza Vaccine (1) 09/01/2023 9/30/2022    Shingles Vaccine (3 of 3) 10/18/2023 8/23/2023    Hemoglobin A1c (Diabetic Prevention Screening) 06/14/2024 6/14/2021    Lipid Panel 06/14/2026 6/14/2021        Orders Placed This Encounter   Procedures    Ambulatory referral/consult to Pulmonology    Ambulatory referral/consult to Internal Medicine       The following information is provided to all patients.  This information is to help you find resources for any of the problems found today that may be affecting your health:                Living healthy guide: www.[facility].[place].gov      Understanding Diabetes: www.diabetes.org      Eating healthy: www.cdc.gov/healthyweight      CDC home safety checklist: www.cdc.gov/steadi/patient.html      Agency on Aging: www.goea.[place].[facility]      Alcoholics anonymous (AA): www.aa.org      Physical Activity: www.patrick.nih.gov/tn8cqvj      Tobacco use: www.quitwithusla.org

## 2023-08-23 NOTE — PROGRESS NOTES
Nilo Hoover presented for a  Medicare AWV and comprehensive Health Risk Assessment today. The following components were reviewed and updated:    Medical history  Family History  Social history  Allergies and Current Medications  Health Risk Assessment  Health Maintenance  Care Team         ** See Completed Assessments for Annual Wellness Visit within the encounter summary.**         The following assessments were completed:  Living Situation  CAGE  Depression Screening  Timed Get Up and Go  Whisper Test  Cognitive Function Screening  Nutrition Screening  ADL Screening  PAQ Screening          Vitals:    08/23/23 1110   BP: 132/72   Pulse: 75   SpO2: 96%   Weight: 76.6 kg (168 lb 14 oz)     Body mass index is 25.68 kg/m².  Physical Exam  Vitals and nursing note reviewed.   Constitutional:       Appearance: He is well-developed.   HENT:      Head: Normocephalic and atraumatic.      Right Ear: External ear normal. Decreased hearing noted. There is impacted cerumen.      Left Ear: External ear normal. Decreased hearing noted. There is impacted cerumen.   Eyes:      Conjunctiva/sclera: Conjunctivae normal.      Pupils: Pupils are equal, round, and reactive to light.   Cardiovascular:      Rate and Rhythm: Normal rate and regular rhythm.      Heart sounds: Murmur heard.   Pulmonary:      Effort: Pulmonary effort is normal.      Breath sounds: Normal breath sounds.   Abdominal:      General: Bowel sounds are normal.      Palpations: Abdomen is soft.   Musculoskeletal:         General: Normal range of motion.      Cervical back: Normal range of motion and neck supple.   Skin:     General: Skin is warm and dry.      Comments: Multiple bruising noted to BUE, erythema to face   Neurological:      Mental Status: He is alert and oriented to person, place, and time.   Psychiatric:         Mood and Affect: Mood is anxious.               Diagnoses and health risks identified today and associated recommendations/orders:    1.  Encounter for preventive health examination  Health Maintenance updated   Records reviewed   Exam done     2. Chronic obstructive pulmonary disease with acute lower respiratory infection  - Ambulatory referral/consult to Pulmonology; Future  - CT Chest Lung Screening Low Dose; Future    3. Hypertension, unspecified type  Stable, followed by PCP   Take medications as prescribed.   Monitor BP at home, goal BP < or = 140/80, call office if consistently above this range.   Follow low salt DASH diet and exercise.   BMI reviewed.   - Ambulatory referral/consult to Internal Medicine; Future    4. Acute alcohol abuse, uncomplicated  Discussed AA and reducing alcohol intake. Referral to therapy declined.    5. Fatty liver  Stable and chronic. Followed by hepatology.    6. Aortic atherosclerosis  Noted on CT scan on 6/2022. Stable and chronic.     7. Seizures  [place] None since. Declines referral to neurology.    8. Purpura  Bruising noted to KETTY. Stable and chronic.     9. Personal history of nicotine dependence  - CT Chest Lung Screening Low Dose; Future    10. Pulmonary emphysema, unspecified emphysema type  Referral placed to pulmonology.     11. Decreased hearing, unspecified laterality  Patient declines referral to audiology.     12. Encounter for screening for other disorder  Abnormal clock drawing test. Patient attributes abnormal testing to anxiety. Declines referral at this time.   Counseling and Referral of Other Preventative  (Italic type indicates deductible and co-insurance are waived)    Patient Name: Nilo Hoover  Today's Date: 8/24/2023    Health Maintenance         Date Due Completion Date    TETANUS VACCINE Never done ---    High Dose Statin Never done ---    Colorectal Cancer Screening 09/21/2021 9/21/2018    COVID-19 Vaccine (3 - Booster for Samira series) 03/08/2022 1/11/2022    PROSTATE-SPECIFIC ANTIGEN 06/14/2022 6/14/2021    LDCT Lung Screen 07/06/2023 7/6/2022     Influenza Vaccine (1) 09/01/2023 9/30/2022    Shingles Vaccine (3 of 3) 10/18/2023 8/23/2023    Hemoglobin A1c (Diabetic Prevention Screening) 06/14/2024 6/14/2021    Lipid Panel 06/14/2026 6/14/2021          Orders Placed This Encounter   Procedures    CT Chest Lung Screening Low Dose    Ambulatory referral/consult to Pulmonology    Ambulatory referral/consult to Internal Medicine         Provided Nilo with a 5-10 year written screening schedule and personal prevention plan. Recommendations were developed using the USPSTF age appropriate recommendations. Education, counseling, and referrals were provided as needed. After Visit Summary printed and given to patient which includes a list of additional screenings\tests needed.    Follow up in about 1 year (around 8/23/2024) for medicare annual wellness .    Zoya Jules, NP      I offered to discuss advanced care planning, including how to pick a person who would make decisions for you if you were unable to make them for yourself, called a health care power of , and what kind of decisions you might make such as use of life sustaining treatments such as ventilators and tube feeding when faced with a life limiting illness recorded on a living will that they will need to know. (How you want to be cared for as you near the end of your natural life)     X Patient is interested in learning more about how to make advanced directives.  I provided them paperwork and offered to discuss this with them.  Review for Opioid Screening: Pt does not have Rx for Opioids     Review for Substance Use Disorders: Patient does not use substance

## 2023-08-25 DIAGNOSIS — J43.2 CENTRILOBULAR EMPHYSEMA: ICD-10-CM

## 2023-08-29 ENCOUNTER — PATIENT OUTREACH (OUTPATIENT)
Dept: ADMINISTRATIVE | Facility: HOSPITAL | Age: 68
End: 2023-08-29
Payer: MEDICARE

## 2023-08-29 NOTE — PROGRESS NOTES
Patient outreach ( Patient outreached for 3 yr almost off panel)       Patient mailbox full reaching out to patient to see if patient is interested in scheduling an annual exam with pcp.

## 2023-08-30 RX ORDER — ALBUTEROL SULFATE 90 UG/1
AEROSOL, METERED RESPIRATORY (INHALATION)
Qty: 8.5 G | Refills: 6 | OUTPATIENT
Start: 2023-08-30

## 2023-09-22 ENCOUNTER — TELEPHONE (OUTPATIENT)
Dept: PULMONOLOGY | Facility: CLINIC | Age: 68
End: 2023-09-22
Payer: MEDICARE

## 2023-09-22 DIAGNOSIS — J44.9 CHRONIC OBSTRUCTIVE PULMONARY DISEASE, UNSPECIFIED COPD TYPE: Primary | ICD-10-CM

## 2023-09-29 ENCOUNTER — HOSPITAL ENCOUNTER (OUTPATIENT)
Dept: PULMONOLOGY | Facility: CLINIC | Age: 68
Discharge: HOME | End: 2023-09-29
Payer: MEDICARE

## 2023-09-29 ENCOUNTER — OFFICE VISIT (OUTPATIENT)
Dept: PULMONOLOGY | Facility: CLINIC | Age: 68
End: 2023-09-29
Payer: MEDICARE

## 2023-09-29 VITALS
SYSTOLIC BLOOD PRESSURE: 152 MMHG | OXYGEN SATURATION: 99 % | HEART RATE: 69 BPM | DIASTOLIC BLOOD PRESSURE: 90 MMHG | HEIGHT: 66 IN | WEIGHT: 177.69 LBS | BODY MASS INDEX: 28.56 KG/M2

## 2023-09-29 DIAGNOSIS — J44.9 CHRONIC OBSTRUCTIVE PULMONARY DISEASE, UNSPECIFIED COPD TYPE: ICD-10-CM

## 2023-09-29 DIAGNOSIS — J43.2 CENTRILOBULAR EMPHYSEMA: ICD-10-CM

## 2023-09-29 LAB
DLCO SINGLE BREATH LLN: 16.89
DLCO SINGLE BREATH PRE REF: 35.3 %
DLCO SINGLE BREATH REF: 23.81
DLCOC SBVA LLN: 2.47
DLCOC SBVA REF: 3.77
DLCOC SINGLE BREATH LLN: 16.89
DLCOC SINGLE BREATH REF: 23.81
DLCOCSBVAULN: 5.07
DLCOCSINGLEBREATHULN: 30.74
DLCOSINGLEBREATHULN: 30.74
DLCOVA LLN: 2.47
DLCOVA PRE REF: 57.5 %
DLCOVA PRE: 2.17 ML/(MIN*MMHG*L) (ref 2.47–5.07)
DLCOVA REF: 3.77
DLCOVAULN: 5.07
FEF 25 75 LLN: 0.96
FEF 25 75 PRE REF: 21.7 %
FEF 25 75 REF: 2.21
FET100 CHG: -10.4 %
FEV05 LLN: 1.13
FEV05 REF: 2.27
FEV1 CHG: -0.7 %
FEV1 FVC LLN: 63
FEV1 FVC PRE REF: 54.2 %
FEV1 FVC REF: 77
FEV1 LLN: 2.03
FEV1 PRE REF: 40.6 %
FEV1 REF: 2.82
FEV1 VOL CHG: -0.01
FVC CHG: -0.1 %
FVC LLN: 2.73
FVC PRE REF: 74.7 %
FVC REF: 3.68
FVC VOL CHG: -0
IVC PRE: 2.48 L (ref 2.73–4.64)
IVC SINGLE BREATH LLN: 2.73
IVC SINGLE BREATH PRE REF: 67.2 %
IVC SINGLE BREATH REF: 3.68
IVCSINGLEBREATHULN: 4.64
PEF LLN: 5.42
PEF PRE REF: 31.3 %
PEF REF: 7.48
PHYSICIAN COMMENT: ABNORMAL
POST FEF 25 75: 0.52 L/S (ref 0.96–3.97)
POST FET 100: 6.58 SEC
POST FEV1 FVC: 41.32 % (ref 63.13–88.66)
POST FEV1: 1.13 L (ref 2.03–3.55)
POST FEV5: 0.74 L (ref 1.13–3.4)
POST FVC: 2.75 L (ref 2.73–4.64)
POST PEF: 2.56 L/S (ref 5.42–9.54)
PRE DLCO: 8.4 ML/(MIN*MMHG) (ref 16.89–30.74)
PRE FEF 25 75: 0.48 L/S (ref 0.96–3.97)
PRE FET 100: 7.34 SEC
PRE FEV05 REF: 32.7 %
PRE FEV1 FVC: 41.57 % (ref 63.13–88.66)
PRE FEV1: 1.14 L (ref 2.03–3.55)
PRE FEV5: 0.74 L (ref 1.13–3.4)
PRE FVC: 2.75 L (ref 2.73–4.64)
PRE PEF: 2.34 L/S (ref 5.42–9.54)
VA PRE: 3.87 L (ref 6.16–6.16)
VA SINGLE BREATH LLN: 6.16
VA SINGLE BREATH PRE REF: 62.8 %
VA SINGLE BREATH REF: 6.16
VASINGLEBREATHULN: 6.16

## 2023-09-29 PROCEDURE — 94729 PR C02/MEMBANE DIFFUSE CAPACITY: ICD-10-PCS | Mod: 26,S$PBB,, | Performed by: INTERNAL MEDICINE

## 2023-09-29 PROCEDURE — 94727 GAS DIL/WSHOT DETER LNG VOL: CPT | Mod: 53,PBBFAC | Performed by: INTERNAL MEDICINE

## 2023-09-29 PROCEDURE — 94729 DIFFUSING CAPACITY: CPT | Mod: 26,S$PBB,, | Performed by: INTERNAL MEDICINE

## 2023-09-29 PROCEDURE — 99213 OFFICE O/P EST LOW 20 MIN: CPT | Mod: S$PBB,25,, | Performed by: INTERNAL MEDICINE

## 2023-09-29 PROCEDURE — 99999 PR PBB SHADOW E&M-EST. PATIENT-LVL IV: ICD-10-PCS | Mod: PBBFAC,,, | Performed by: INTERNAL MEDICINE

## 2023-09-29 PROCEDURE — 99999 PR PBB SHADOW E&M-EST. PATIENT-LVL IV: CPT | Mod: PBBFAC,,, | Performed by: INTERNAL MEDICINE

## 2023-09-29 PROCEDURE — 94060 EVALUATION OF WHEEZING: CPT | Mod: PBBFAC | Performed by: INTERNAL MEDICINE

## 2023-09-29 PROCEDURE — 94060 PR EVAL OF BRONCHOSPASM: ICD-10-PCS | Mod: 26,S$PBB,, | Performed by: INTERNAL MEDICINE

## 2023-09-29 PROCEDURE — 94729 DIFFUSING CAPACITY: CPT | Mod: PBBFAC | Performed by: INTERNAL MEDICINE

## 2023-09-29 PROCEDURE — 94060 EVALUATION OF WHEEZING: CPT | Mod: 26,S$PBB,, | Performed by: INTERNAL MEDICINE

## 2023-09-29 PROCEDURE — 99214 OFFICE O/P EST MOD 30 MIN: CPT | Mod: PBBFAC,25 | Performed by: INTERNAL MEDICINE

## 2023-09-29 PROCEDURE — 99213 PR OFFICE/OUTPT VISIT, EST, LEVL III, 20-29 MIN: ICD-10-PCS | Mod: S$PBB,25,, | Performed by: INTERNAL MEDICINE

## 2023-09-29 NOTE — ASSESSMENT & PLAN NOTE
Feels like he has been worse, however does note that the heat has been tough. Was started on Incruse and has been using it nightly in addition to to spiriva in AM and Symbicort BID. Educated to use Incruse and stop Spiriva as opposed to using two LABAs. Continue Symbicort. Discussed appropriate inhaler technique and ensured rinsing mouth out after use.

## 2023-09-29 NOTE — PROGRESS NOTES
Subjective:      Patient ID: Nilo Hoover is a 68 y.o. male.    Chief Complaint: No chief complaint on file.    Mr. Hoover is a 65 yo with HTN, fatty liver disease/cirrhosis, and GERD that presents for follow up COPD. Last seen by Dr. Escalona. Incruse added 5 months ago in addition to Spiriva. Wheezing more recently. Has good days and bad days. Has tougher time with the heat.    Smoking hx: quit 11 years ago, 30+ py smoking hx  Work hx: retired   Exposure hx: None  Inhaler use: Symbicort, Incruse (nightly), spiriva (AM)   PRN inhaler use: albuterol- 10x/week  Hx of lung dz: COPD  Family hx of lung dz: dad- bronchitis/emphysema    Review of Systems   Constitutional:  Positive for activity change. Negative for weight loss, fatigue and weakness.   HENT:  Negative for postnasal drip and congestion.    Respiratory:  Positive for cough (intermitent), shortness of breath, dyspnea on extertion and use of rescue inhaler. Negative for hemoptysis, sputum production, chest tightness and wheezing.    Cardiovascular:  Negative for chest pain, palpitations and leg swelling.   Gastrointestinal:  Negative for nausea, vomiting and acid reflux.   Neurological:  Negative for syncope and light-headedness.   Psychiatric/Behavioral:  Negative for confusion and sleep disturbance. The patient is not nervous/anxious.        Objective:     Physical Exam   Constitutional: He is oriented to person, place, and time. He appears well-developed and well-nourished. No distress. He is not obese.   HENT:   Head: Normocephalic.   Cardiovascular: Normal rate, regular rhythm and normal heart sounds. Exam reveals no gallop and no friction rub.   No murmur heard.  Pulmonary/Chest: Hyperinflation, symmetric chest wall expansion and effort normal. No respiratory distress. He has decreased breath sounds. He has no wheezes. He has no rhonchi. He has no rales.   Musculoskeletal:         General: No edema.   Neurological: He is alert and  "oriented to person, place, and time. Gait normal.   Skin: He is not diaphoretic. No cyanosis. Nails show no clubbing.   Psychiatric: He has a normal mood and affect. His behavior is normal. Judgment and thought content normal.   Vitals reviewed.      Personal Diagnostic Review    CT of chest performed on 7/6/23 without contrast revealed RUL pulmonary nodule, decreased in size since 2013 CT chest. New KONRAD 0.6 cm pulmonary nodule. Severe bilateral panlobular emphysema.    Echocardiogram: 7/6/22  The left ventricle is normal in size with normal systolic function.  The estimated ejection fraction is 65%.  Normal left ventricular diastolic function.  Normal right ventricular size with normal right ventricular systolic function.  Normal central venous pressure (3 mmHg).  The estimated PA systolic pressure is 23 mmHg.    Pulmonary function tests:   FEV1: 1.14L  (40.6 % predicted),   FVC:  2.75L (74.7 % predicted),   FEV1/FVC:  41,   TLC: unable to perform  DLCO: 8.40 (35.5 % predicted)        8/23/2023    11:10 AM 8/3/2023     2:51 PM 4/26/2023     9:16 AM 8/11/2022     9:27 AM 7/20/2022     4:43 PM 7/6/2022     1:51 PM 6/27/2022     8:16 AM   Pulmonary Function Tests   SpO2 96 %   99 % 97 %  97 %   Height  5' 8" (1.727 m) 5' 8" (1.727 m) 5' 8" (1.727 m) 5' 8" (1.727 m) 5' 8" (1.727 m) 5' 8" (1.727 m)   Weight 76.6 kg (168 lb 14 oz) 75.3 kg (166 lb) 75.3 kg (166 lb 0.1 oz) 78 kg (172 lb) 78 kg (172 lb) 79.4 kg (175 lb) 79.7 kg (175 lb 11.3 oz)   BMI (Calculated)  25.2 25.2 26.2 26.2 26.6 26.7        Assessment:     1. Centrilobular emphysema      Outpatient Encounter Medications as of 9/29/2023   Medication Sig Dispense Refill    albuterol (PROVENTIL/VENTOLIN HFA) 90 mcg/actuation inhaler INHALE 2 PUFFS INTO THE LUNGS EVERY 6 HOURS AS NEEDED FOR WHEEZING OR SHORTNESS OF BREATH 8.5 g 6    benzonatate (TESSALON) 100 MG capsule Take 2 capsules (200 mg total) by mouth 3 (three) times daily as needed for Cough. 30 capsule 0    " budesonide-formoterol 160-4.5 mcg (SYMBICORT) 160-4.5 mcg/actuation HFAA Inhale 2 puffs into the lungs every 12 hours. 10.2 g 0    INCRUSE ELLIPTA 62.5 mcg/actuation inhalation capsule INHALE 1 PUFF INTO THE LUNGS ONCE DAILY AS CONTROLLER 30 each 11    ipratropium (ATROVENT) 42 mcg (0.06 %) nasal spray 2 sprays by Nasal route 3 (three) times daily. (Patient not taking: Reported on 8/3/2023) 30 mL 0    lisinopriL (PRINIVIL,ZESTRIL) 20 MG tablet TAKE 1 TABLET(20 MG) BY MOUTH EVERY DAY 90 tablet 0    omeprazole 20 mg TbEC Take by mouth. 1 Tablet, Delayed Release (E.C.) Oral Every day      predniSONE (DELTASONE) 20 MG tablet Take 1 tablet (20 mg total) by mouth 2 (two) times daily. (Patient not taking: Reported on 8/3/2023) 10 tablet 0    SPIRIVA WITH HANDIHALER 18 mcg inhalation capsule INHALE THE CONTENTS OF 1 CAPSULE(18 MCG) INTO THE LUNGS EVERY DAY 30 capsule 11    SYMBICORT 160-4.5 mcg/actuation HFAA INHALE 2 PUFFS INTO THE LUNGS EVERY 12 HOURS 10.2 g 12    tamsulosin (FLOMAX) 0.4 mg Cap TAKE 1 CAPSULE(0.4 MG) BY MOUTH EVERY DAY 30 capsule 12    tamsulosin (FLOMAX) 0.4 mg Cap Take 1 capsule (0.4 mg total) by mouth once daily. 30 capsule 12    tiotropium (SPIRIVA) 18 mcg inhalation capsule Inhale 18 mcg into the lungs once daily. Controller      tiotropium (SPIRIVA) 18 mcg inhalation capsule Inhale 1 capsule (18mcg) into the lungs once daily. (Patient not taking: Reported on 7/20/2022) 30 capsule 0     No facility-administered encounter medications on file as of 9/29/2023.     No orders of the defined types were placed in this encounter.      Plan:     COPD (chronic obstructive pulmonary disease)  Feels like he has been worse, however does note that the heat has been tough. Was started on Incruse and has been using it nightly in addition to to spiriva in AM and Symbicort BID. Educated to use Incruse and stop Spiriva as opposed to using two LABAs. Continue Symbicort. Discussed appropriate inhaler technique and  ensured rinsing mouth out after use.     Follow up in 3 months.     Jacques Perdomo MD  [facility]

## 2023-10-10 DIAGNOSIS — I10 ESSENTIAL HYPERTENSION: ICD-10-CM

## 2023-10-10 RX ORDER — LISINOPRIL 20 MG/1
TABLET ORAL
Qty: 90 TABLET | Refills: 0 | Status: SHIPPED | OUTPATIENT
Start: 2023-10-10 | End: 2024-01-05

## 2023-10-10 NOTE — TELEPHONE ENCOUNTER
No care due was identified.  [facility] Embedded Care Due Messages. Reference number: [id number].   10/10/2023 5:26:48 AM CDT

## 2023-10-12 ENCOUNTER — HOSPITAL ENCOUNTER (OUTPATIENT)
Dept: RADIOLOGY | Facility: HOSPITAL | Age: 68
Discharge: HOME | End: 2023-10-12
Attending: NURSE PRACTITIONER
Payer: MEDICARE

## 2023-10-12 DIAGNOSIS — Z87.891 PERSONAL HISTORY OF NICOTINE DEPENDENCE: ICD-10-CM

## 2023-10-12 DIAGNOSIS — J44.0 CHRONIC OBSTRUCTIVE PULMONARY DISEASE WITH ACUTE LOWER RESPIRATORY INFECTION: ICD-10-CM

## 2023-10-12 PROCEDURE — 71271 CT CHEST LUNG SCREENING LOW DOSE: ICD-10-PCS | Mod: 26,,, | Performed by: RADIOLOGY

## 2023-10-12 PROCEDURE — 71271 CT THORAX LUNG CANCER SCR C-: CPT | Mod: 26,,, | Performed by: RADIOLOGY

## 2023-10-12 PROCEDURE — 71271 CT THORAX LUNG CANCER SCR C-: CPT | Mod: TC

## 2023-10-16 ENCOUNTER — TELEPHONE (OUTPATIENT)
Dept: PULMONOLOGY | Facility: CLINIC | Age: 68
End: 2023-10-16
Payer: MEDICARE

## 2023-10-16 ENCOUNTER — PATIENT MESSAGE (OUTPATIENT)
Dept: CRITICAL CARE MEDICINE | Facility: HOSPITAL | Age: 68
End: 2023-10-16
Payer: MEDICARE

## 2023-10-16 DIAGNOSIS — R91.8 MULTIPLE PULMONARY NODULES DETERMINED BY COMPUTED TOMOGRAPHY OF LUNG: Primary | ICD-10-CM

## 2023-10-16 NOTE — TELEPHONE ENCOUNTER
I spoke with Mr Hoover in regards to scheduling his PET CT. Patient scheduled on 10/20/23 at 2 PM. Instructions were given. Appointment mailed. Patient confirmed and verbalized understanding.

## 2023-10-20 ENCOUNTER — HOSPITAL ENCOUNTER (OUTPATIENT)
Dept: RADIOLOGY | Facility: HOSPITAL | Age: 68
Discharge: HOME | End: 2023-10-20
Attending: INTERNAL MEDICINE
Payer: MEDICARE

## 2023-10-20 DIAGNOSIS — R91.8 MULTIPLE PULMONARY NODULES DETERMINED BY COMPUTED TOMOGRAPHY OF LUNG: ICD-10-CM

## 2023-10-20 LAB — POCT GLUCOSE: 65 MG/DL (ref 70–110)

## 2023-10-20 PROCEDURE — 78815 NM PET CT ROUTINE: ICD-10-PCS | Mod: 26,PI,, | Performed by: STUDENT IN AN ORGANIZED HEALTH CARE EDUCATION/TRAINING PROGRAM

## 2023-10-20 PROCEDURE — 78815 PET IMAGE W/CT SKULL-THIGH: CPT | Mod: 26,PI,, | Performed by: STUDENT IN AN ORGANIZED HEALTH CARE EDUCATION/TRAINING PROGRAM

## 2023-10-20 PROCEDURE — A9552 F18 FDG: HCPCS

## 2023-10-25 DIAGNOSIS — K74.00 LIVER FIBROSIS: ICD-10-CM

## 2023-10-25 DIAGNOSIS — K76.0 FATTY LIVER: Primary | ICD-10-CM

## 2024-01-02 ENCOUNTER — HOSPITAL ENCOUNTER (OUTPATIENT)
Dept: RADIOLOGY | Facility: HOSPITAL | Age: 69
Discharge: HOME | End: 2024-01-02
Attending: NURSE PRACTITIONER
Payer: MEDICARE

## 2024-01-02 ENCOUNTER — PATIENT MESSAGE (OUTPATIENT)
Dept: PULMONOLOGY | Facility: CLINIC | Age: 69
End: 2024-01-02
Payer: MEDICARE

## 2024-01-02 DIAGNOSIS — K76.0 FATTY LIVER: ICD-10-CM

## 2024-01-02 DIAGNOSIS — K74.00 LIVER FIBROSIS: ICD-10-CM

## 2024-01-02 DIAGNOSIS — J43.2 CENTRILOBULAR EMPHYSEMA: Primary | ICD-10-CM

## 2024-01-02 PROCEDURE — 76705 ECHO EXAM OF ABDOMEN: CPT | Mod: 26,,, | Performed by: RADIOLOGY

## 2024-01-02 PROCEDURE — 76705 ECHO EXAM OF ABDOMEN: CPT | Mod: TC

## 2024-01-02 RX ORDER — FLUTICASONE FUROATE AND VILANTEROL 200; 25 UG/1; UG/1
1 POWDER RESPIRATORY (INHALATION) DAILY
Qty: 1 EACH | Refills: 5 | Status: SHIPPED | OUTPATIENT
Start: 2024-01-02

## 2024-01-04 ENCOUNTER — OFFICE VISIT (OUTPATIENT)
Dept: URGENT CARE | Facility: CLINIC | Age: 69
End: 2024-01-04
Payer: MEDICARE

## 2024-01-04 VITALS
BODY MASS INDEX: 28.45 KG/M2 | DIASTOLIC BLOOD PRESSURE: 82 MMHG | HEART RATE: 69 BPM | OXYGEN SATURATION: 95 % | SYSTOLIC BLOOD PRESSURE: 161 MMHG | HEIGHT: 66 IN | TEMPERATURE: 98 F | WEIGHT: 177 LBS | RESPIRATION RATE: 18 BRPM

## 2024-01-04 DIAGNOSIS — H61.23 BILATERAL IMPACTED CERUMEN: ICD-10-CM

## 2024-01-04 PROCEDURE — 99213 OFFICE O/P EST LOW 20 MIN: CPT | Mod: 25,S$GLB,,

## 2024-01-04 PROCEDURE — 69209 REMOVE IMPACTED EAR WAX UNI: CPT | Mod: 50,S$GLB,,

## 2024-01-04 NOTE — PROGRESS NOTES
Subjective:      Patient ID: Nilo Hoover is a 68 y.o. male.    Vitals:  vitals were not taken for this visit.     Chief Complaint: No chief complaint on file.    Pt is a 67 yo male presenting with left ear fullness.  Onset of symptoms was 1 month ago.  Associated symptoms include trouble hearing. Pt reports using no OTC medication.         Ear Fullness   There is pain in the left ear. This is a new problem. The current episode started more than 1 month ago. The problem occurs constantly. The problem has been unchanged. There has been no fever. The pain is at a severity of 0/10. The patient is experiencing no pain. Associated symptoms include hearing loss. Pertinent negatives include no abdominal pain, coughing, diarrhea, ear discharge, headaches, neck pain, rash, rhinorrhea, sore throat or vomiting. He has tried nothing for the symptoms. There is no history of a chronic ear infection, hearing loss or a tympanostomy tube.     Constitution: Negative for activity change, appetite change, chills, fatigue and fever.   HENT:  Positive for hearing loss. Negative for ear pain, ear discharge, congestion, postnasal drip, sinus pain, sinus pressure and sore throat.    Neck: Negative for neck pain and neck swelling.   Cardiovascular:  Negative for chest pain and sob on exertion.   Eyes:  Negative for eye trauma, eye discharge and eye redness.   Respiratory:  Negative for cough, shortness of breath and wheezing.    Gastrointestinal:  Negative for abdominal pain, nausea, vomiting, constipation and diarrhea.   Genitourinary:  Negative for dysuria, frequency, urgency and urine decreased.   Musculoskeletal:  Negative for pain, joint pain, joint swelling, abnormal ROM of joint and muscle ache.   Skin:  Negative for color change, rash, laceration and erythema.   Neurological:  Negative for dizziness, light-headedness, headaches, altered mental status and numbness.   Psychiatric/Behavioral:  Negative for altered mental status and  confusion.     Past Medical History:   Diagnosis Date    Bronchitis chronic     COPD (chronic obstructive pulmonary disease)     Degenerative disc disease     lumar spine     Erectile dysfunction     GERD (gastroesophageal reflux disease)     hx gastritis    H/O: GI bleed     HTN (hypertension) 2013    Personal history of colonic polyps     Seizures        Past Surgical History:   Procedure Laterality Date    COLONOSCOPY N/A 2018    Procedure: COLONOSCOPY;  Surgeon: Minesh Garzon MD;  Location: [address]);  Service: Endoscopy;  Laterality: N/A;  3 year f/u-past due- history of colon polyps    COLONOSCOPY W/ POLYPECTOMY      ESOPHAGOGASTRODUODENOSCOPY      TONSILLECTOMY         Family History   Problem Relation Age of Onset    Heart disease Mother     Cancer Mother         colon     COPD Father     Emphysema Father     Seizures Son     Seizures Brother     Hypertension Brother        Social History     Socioeconomic History    Marital status: Single   Occupational History     Employer: [employer]   Tobacco Use    Smoking status: Former     Current packs/day: 0.00     Average packs/day: 1.5 packs/day for 35.0 years (52.5 ttl pk-yrs)     Types: Cigarettes     Start date: 1974     Quit date: 2009     Years since quittin.9    Smokeless tobacco: Never   Substance and Sexual Activity    Alcohol use: Yes     Comment: 1/5 of whiskey weekly, stopped completed May 2021 but resumed in , drinking nightly 3-5 servings nightly, stopped mid 2023    Drug use: No     Social Determinants of Health     Financial Resource Strain: Low Risk  (2023)    Overall Financial Resource Strain (CARDIA)     Difficulty of Paying Living Expenses: Not hard at all   Food Insecurity: No Food Insecurity (2023)    Hunger Vital Sign     Worried About Running Out of Food in the Last Year: Never true     Ran Out of Food in the Last Year: Never true    Transportation Needs: No Transportation Needs (8/23/2023)    PRAPARE - Transportation     Lack of Transportation (Medical): No     Lack of Transportation (Non-Medical): No   Physical Activity: Insufficiently Active (8/23/2023)    Exercise Vital Sign     Days of Exercise per Week: 2 days     Minutes of Exercise per Session: 20 min   Housing Stability: Low Risk  (8/23/2023)    Housing Stability Vital Sign     Unable to Pay for Housing in the Last Year: No     Number of Places Lived in the Last Year: 1     Unstable Housing in the Last Year: No       Current Outpatient Medications   Medication Sig Dispense Refill    albuterol (PROVENTIL/VENTOLIN HFA) 90 mcg/actuation inhaler INHALE 2 PUFFS INTO THE LUNGS EVERY 6 HOURS AS NEEDED FOR WHEEZING OR SHORTNESS OF BREATH 8.5 g 6    benzonatate (TESSALON) 100 MG capsule Take 2 capsules (200 mg total) by mouth 3 (three) times daily as needed for Cough. 30 capsule 0    budesonide-formoterol 160-4.5 mcg (SYMBICORT) 160-4.5 mcg/actuation HFAA Inhale 2 puffs into the lungs every 12 hours. 10.2 g 0    fluticasone furoate-vilanteroL (BREO ELLIPTA) 200-25 mcg/dose DsDv diskus inhaler Inhale 1 puff into the lungs once daily. Controller 1 each 5    INCRUSE ELLIPTA 62.5 mcg/actuation inhalation capsule INHALE 1 PUFF INTO THE LUNGS ONCE DAILY AS CONTROLLER 30 each 11    ipratropium (ATROVENT) 42 mcg (0.06 %) nasal spray 2 sprays by Nasal route 3 (three) times daily. 30 mL 0    lisinopriL (PRINIVIL,ZESTRIL) 20 MG tablet TAKE 1 TABLET(20 MG) BY MOUTH EVERY DAY 90 tablet 0    omeprazole 20 mg TbEC Take by mouth. 1 Tablet, Delayed Release (E.C.) Oral Every day      predniSONE (DELTASONE) 20 MG tablet Take 1 tablet (20 mg total) by mouth 2 (two) times daily. 10 tablet 0    SPIRIVA WITH HANDIHALER 18 mcg inhalation capsule INHALE THE CONTENTS OF 1 CAPSULE(18 MCG) INTO THE LUNGS EVERY DAY 30 capsule 11    SYMBICORT 160-4.5 mcg/actuation HFAA INHALE 2 PUFFS INTO THE LUNGS EVERY  12 HOURS 10.2 g 12    tamsulosin (FLOMAX) 0.4 mg Cap TAKE 1 CAPSULE(0.4 MG) BY MOUTH EVERY DAY 30 capsule 12    tamsulosin (FLOMAX) 0.4 mg Cap Take 1 capsule (0.4 mg total) by mouth once daily. 30 capsule 12    tiotropium (SPIRIVA) 18 mcg inhalation capsule Inhale 18 mcg into the lungs once daily. Controller      tiotropium (SPIRIVA) 18 mcg inhalation capsule Inhale 1 capsule (18mcg) into the lungs once daily. 30 capsule 0     No current facility-administered medications for this visit.       Review of patient's allergies indicates:  No Known Allergies    Objective:     Physical Exam   Constitutional: He is oriented to person, place, and time. He appears well-developed. He is cooperative.  Non-toxic appearance. He does not appear ill. No distress.      Comments:Pt sitting erect on examination table. No acute respiratory distress, no use of accessory muscles, no notice of nasal flaring.        HENT:   Head: Normocephalic and atraumatic.   Ears:   Right Ear: Hearing, tympanic membrane, external ear and ear canal normal. There is cerumen present. impacted cerumen  Left Ear: Hearing, tympanic membrane, external ear and ear canal normal. There is cerumen present. impacted cerumen  Nose: Nose normal. No mucosal edema, rhinorrhea, nasal deformity or congestion. No epistaxis. Right sinus exhibits no maxillary sinus tenderness and no frontal sinus tenderness. Left sinus exhibits no maxillary sinus tenderness and no frontal sinus tenderness.   Mouth/Throat: Uvula is midline, oropharynx is clear and moist and mucous membranes are normal. Mucous membranes are moist. No trismus in the jaw. Normal dentition. No uvula swelling. No oropharyngeal exudate, posterior oropharyngeal edema or posterior oropharyngeal erythema. Oropharynx is clear.   Eyes: Conjunctivae and lids are normal. Pupils are equal, round, and reactive to light. No scleral icterus. Extraocular movement intact   Neck: Trachea normal and phonation normal. Neck  supple. No edema present. No erythema present. No neck rigidity present.   Cardiovascular: Normal rate, regular rhythm, normal heart sounds and normal pulses.   Pulmonary/Chest: Effort normal and breath sounds normal. No accessory muscle usage. No apnea, no tachypnea and no bradypnea. No respiratory distress. He has no decreased breath sounds. He has no wheezes. He has no rhonchi.   Lungs clear to auscultation B/L           Comments: Lungs clear to auscultation B/L      Abdominal: Normal appearance.   Musculoskeletal: Normal range of motion.         General: No deformity. Normal range of motion.      Left hand: He exhibits laceration.   Neurological: He is alert and oriented to person, place, and time. He exhibits normal muscle tone. Coordination normal.   Skin: Skin is warm, dry, intact, not diaphoretic and not pale. Lacerations - upper ext.:  left handNo erythema   Psychiatric: His speech is normal and behavior is normal. Judgment and thought content normal.   Nursing note and vitals reviewed.  Assessment:     No diagnosis found.    Plan:   I have reviewed the patient chart and pertinent past imaging/labs.     There are no diagnoses linked to this encounter.

## 2024-01-04 NOTE — PATIENT INSTRUCTIONS
Follow up with ENT: [phone]  AVOID USING QTIPS OR ANYTHING IN YOUR EAR  Please return here or go to the Emergency Department for any concerns or worsening of condition.  If you were prescribed antibiotics, please take them to completion.  If you were prescribed a narcotic medication, do not drive or operate heavy equipment or machinery while taking these medications.  Please follow up with your primary care doctor or specialist as needed.    If you  smoke, please stop smoking.

## 2024-01-04 NOTE — PROCEDURES
"Ear Cerumen Removal    Date/Time: 1/4/2024 2:30 PM    Performed by: Doe Laguerre PA-C  Authorized by: Doe Laguerre PA-C    Time out: Immediately prior to procedure a "time out" was called to verify the correct patient, procedure, equipment, support staff and site/side marked as required.    Consent Done?:  Yes (Verbal)    Local anesthetic:  None  Medication Used:  Cerumenex  Location details:  Both ears  Procedure type: irrigation      " 1.  Patient is to continue his current diet, medication and activity. 2.  Patient has received his second COVID booster vaccine. 3.  Patient is to follow-up with Dr. Anne Arellano concerning his heart murmurs. 4.  I will see the patient back in 3 months with blood tests which will include a CBC, BMP, hemoglobin A1c, lipid panel, AST and ALT. 5.  I will see the patient back sooner as necessary.

## 2024-01-10 ENCOUNTER — LAB VISIT (OUTPATIENT)
Dept: LAB | Facility: HOSPITAL | Age: 69
End: 2024-01-10
Payer: MEDICARE

## 2024-01-10 ENCOUNTER — OFFICE VISIT (OUTPATIENT)
Dept: HEPATOLOGY | Facility: CLINIC | Age: 69
End: 2024-01-10
Payer: MEDICARE

## 2024-01-10 VITALS — WEIGHT: 176.81 LBS | HEIGHT: 66 IN | BODY MASS INDEX: 28.42 KG/M2

## 2024-01-10 DIAGNOSIS — Z14.8 CARRIER OF HEMOCHROMATOSIS HFE GENE MUTATION: ICD-10-CM

## 2024-01-10 DIAGNOSIS — K74.01 HEPATIC FIBROSIS, EARLY FIBROSIS: ICD-10-CM

## 2024-01-10 DIAGNOSIS — R79.89 ELEVATED FERRITIN: ICD-10-CM

## 2024-01-10 DIAGNOSIS — K76.9 LIVER DISEASE, UNSPECIFIED: ICD-10-CM

## 2024-01-10 DIAGNOSIS — R79.89 OTHER SPECIFIED ABNORMAL FINDINGS OF BLOOD CHEMISTRY: ICD-10-CM

## 2024-01-10 DIAGNOSIS — D37.6 NEOPLASM OF UNCERTAIN BEHAVIOR OF LIVER, GALLBLADDER AND BILE DUCTS: ICD-10-CM

## 2024-01-10 DIAGNOSIS — I10 PRIMARY HYPERTENSION: ICD-10-CM

## 2024-01-10 DIAGNOSIS — K76.0 FATTY LIVER: ICD-10-CM

## 2024-01-10 DIAGNOSIS — K76.9 LIVER LESION: Primary | ICD-10-CM

## 2024-01-10 DIAGNOSIS — K76.9 LIVER LESION: ICD-10-CM

## 2024-01-10 PROBLEM — R35.1 NOCTURIA: Status: RESOLVED | Noted: 2023-08-03 | Resolved: 2024-01-10

## 2024-01-10 PROBLEM — K74.00 LIVER FIBROSIS: Status: RESOLVED | Noted: 2022-04-26 | Resolved: 2024-01-10

## 2024-01-10 LAB
AFP SERPL-MCNC: 3.6 NG/ML (ref 0–8.4)
ALBUMIN SERPL BCP-MCNC: 3.9 G/DL (ref 3.5–5.2)
ALP SERPL-CCNC: 58 U/L (ref 55–135)
ALT SERPL W/O P-5'-P-CCNC: 17 U/L (ref 10–44)
ANION GAP SERPL CALC-SCNC: 13 MMOL/L (ref 8–16)
AST SERPL-CCNC: 28 U/L (ref 10–40)
BILIRUB SERPL-MCNC: 0.7 MG/DL (ref 0.1–1)
BUN SERPL-MCNC: 10 MG/DL (ref 8–23)
CALCIUM SERPL-MCNC: 9.6 MG/DL (ref 8.7–10.5)
CANCER AG19-9 SERPL-ACNC: 8.2 U/ML (ref 0–40)
CEA SERPL-MCNC: 3.7 NG/ML (ref 0–5)
CHLORIDE SERPL-SCNC: 99 MMOL/L (ref 95–110)
CO2 SERPL-SCNC: 28 MMOL/L (ref 23–29)
CREAT SERPL-MCNC: 1.1 MG/DL (ref 0.5–1.4)
CREAT SERPL-MCNC: 1.1 MG/DL (ref 0.5–1.4)
ERYTHROCYTE [DISTWIDTH] IN BLOOD BY AUTOMATED COUNT: 12 % (ref 11.5–14.5)
EST. GFR  (NO RACE VARIABLE): >60 ML/MIN/1.73 M^2
EST. GFR  (NO RACE VARIABLE): >60 ML/MIN/1.73 M^2
GLUCOSE SERPL-MCNC: 103 MG/DL (ref 70–110)
HCT VFR BLD AUTO: 44.1 % (ref 40–54)
HGB BLD-MCNC: 15.3 G/DL (ref 14–18)
INR PPP: 0.9 (ref 0.8–1.2)
MCH RBC QN AUTO: 35.3 PG (ref 27–31)
MCHC RBC AUTO-ENTMCNC: 34.7 G/DL (ref 32–36)
MCV RBC AUTO: 102 FL (ref 82–98)
PLATELET # BLD AUTO: 253 K/UL (ref 150–450)
PMV BLD AUTO: 10.1 FL (ref 9.2–12.9)
POTASSIUM SERPL-SCNC: 4.3 MMOL/L (ref 3.5–5.1)
PROT SERPL-MCNC: 7.5 G/DL (ref 6–8.4)
PROTHROMBIN TIME: 10.2 SEC (ref 9–12.5)
RBC # BLD AUTO: 4.33 M/UL (ref 4.6–6.2)
SODIUM SERPL-SCNC: 140 MMOL/L (ref 136–145)
WBC # BLD AUTO: 6.5 K/UL (ref 3.9–12.7)

## 2024-01-10 PROCEDURE — 80321 ALCOHOLS BIOMARKERS 1OR 2: CPT | Performed by: NURSE PRACTITIONER

## 2024-01-10 PROCEDURE — 85027 COMPLETE CBC AUTOMATED: CPT | Performed by: NURSE PRACTITIONER

## 2024-01-10 PROCEDURE — 82378 CARCINOEMBRYONIC ANTIGEN: CPT | Performed by: NURSE PRACTITIONER

## 2024-01-10 PROCEDURE — 82105 ALPHA-FETOPROTEIN SERUM: CPT | Performed by: NURSE PRACTITIONER

## 2024-01-10 PROCEDURE — 85610 PROTHROMBIN TIME: CPT | Performed by: NURSE PRACTITIONER

## 2024-01-10 PROCEDURE — 99999 PR PBB SHADOW E&M-EST. PATIENT-LVL IV: CPT | Mod: PBBFAC,,, | Performed by: NURSE PRACTITIONER

## 2024-01-10 PROCEDURE — 36415 COLL VENOUS BLD VENIPUNCTURE: CPT | Performed by: NURSE PRACTITIONER

## 2024-01-10 PROCEDURE — 99214 OFFICE O/P EST MOD 30 MIN: CPT | Mod: PBBFAC | Performed by: NURSE PRACTITIONER

## 2024-01-10 PROCEDURE — 86301 IMMUNOASSAY TUMOR CA 19-9: CPT | Performed by: NURSE PRACTITIONER

## 2024-01-10 PROCEDURE — 80053 COMPREHEN METABOLIC PANEL: CPT | Performed by: NURSE PRACTITIONER

## 2024-01-10 PROCEDURE — 99214 OFFICE O/P EST MOD 30 MIN: CPT | Mod: S$PBB,,, | Performed by: NURSE PRACTITIONER

## 2024-01-10 NOTE — PATIENT INSTRUCTIONS
MRI to investigate liver lesion   2. Last Fibroscan to look for fat or scar tissue in the liver showed >67% fat in the liver, no scar tissue this time  - repeat in  6 months   3. Follow up in 6 months with  labs and fibroscan before    There is no FDA approved therapy for fatty liver disease. Therefore, these things are important:  Limit alcohol consumption, no more than 2 serving(s) of alcohol in any day (1 serving is 5 ounces of wine, 12 ounces of beer, or 1.5 ounces of liquor) and do NOT recommend any daily alcohol use    2 maintain normal weight   3. Low carb/sugar and high protein diet (~1 g/kg/day).Try to limit your carb intake to LESS than 30-45 grams of carbs with a meal or LESS than 5-10 grams with any snack (total of any snack foods eaten during that time). Use MyFitness Pal or Lose It ignacio to add up your carbs through the day. Do NOT drink any beverages with calories or carbs (this can lead to high blood sugar and weight gain). Also, some of our patients have been very successful with weight loss using the pre made/planned meal planning services that limit calories and portion size ( The main thing to look for is low calorie, high protein, low carb)  4. Exercise, 5 days per week, 30 minutes per day, as tolerated  5. Recommend good cholesterol, blood pressure, blood sugar levels     In some people, fatty liver can progress to steatohepatitis (inflamatory fatty liver) and possibly to cirrhosis, increasing the risk for liver cancer, liver failure, and death. Therefore, the lifestyle changes are very important to decrease this risk.

## 2024-01-10 NOTE — PROGRESS NOTES
[facility] VISIT FOLLOW UP NOTE    PCP: Yasmin Prather MD     CHIEF COMPLAINT: fatty liver, h/o liver fibrosis, HH DNA carrier    HPI: This is a 68 y.o. 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    Prior serologic workup:   Lab Results   Component Value Date    SMOOTHMUSCAB Negative 1:40 08/16/2018    AMAIFA Negative 1:40 08/16/2018    ANASCREEN Negative <1:160 08/16/2018    FERRITIN 349 (H) 10/20/2022    FESATURATED 30 10/20/2022    PETH Negative 07/30/2021    [id number] MS 08/16/2018    EFGDT4SWSWTZ 117 08/16/2018    CERULOPLSM 29.0 08/16/2018    HEPBSAG Negative 08/16/2018    HEPCAB Negative 04/29/2016     Risk factors for fatty liver include heavy alcohol use     Liver fibrosis staging:  -- fibroscan in 2018 was F0, S2 (kPA 6.3, )  -- fibroscan 8/2021 noted F2, S2 (kPA 7.9, )  --fibroascan 4/2022 noted F2, S3 (kPA 9.5, )  -- fibroscan 4/2023 noted F0, S3 (kPA 4.6, )    Interval HPI: Presents today alone. Previous history of heavy/daily alcohol use on and off for many years, currently drinking 2-3 servings most nights of the week with some periods of abstinence  Did not complete labs before visit as scheduled   US with liver lesion, needs mRI    Labs done 10/2022 show normal transaminase levels (previously fluctuating labs, AST>ALT, elevated intermittently since 2016)  Platelets wnl, alk phos WNL  Synthetic liver functioning WNL    Lab Results   Component Value Date    ALT 16 10/20/2022    AST 26 10/20/2022    ALKPHOS 50 (L) 10/20/2022    BILITOT 0.6 10/20/2022    ALBUMIN 3.7 10/20/2022    INR 1.0 10/20/2022     10/20/2022     Abd U/S done 1/2024 noted + liver lesion, no splenomegaly  - needs MRI    Denies family history of liver disease . + previous heavy  "Alcohol consumption, see below  Social History     Substance and Sexual Activity   Alcohol Use Yes    Comment: revious history of heavy/daily alcohol use on and off for many years, currently drinking 2-3 servings most nights of the week with some periods of abstinence       + Immunity to Hep A and B          Allergy and medication list reviewed and updated     PMHX:  has a past medical history of Bronchitis chronic, COPD (chronic obstructive pulmonary disease), Degenerative disc disease, Erectile dysfunction, GERD (gastroesophageal reflux disease), H/O: GI bleed, HTN (hypertension) (2/26/2013), Personal history of colonic polyps (8/9), and Seizures.    PSHX:  has a past surgical history that includes Tonsillectomy; Colonoscopy w/ polypectomy; Esophagogastroduodenoscopy; and Colonoscopy (N/A, 9/21/2018).    FAMILY HISTORY: Updated and reviewed in [facility]    SOCIAL HISTORY:   Social History     Substance and Sexual Activity   Alcohol Use Yes    Comment: 1/5 of whiskey weekly, stopped completed May 2021 but resumed in 2021, drinking nightly 3-5 servings nightly, stopped mid April 2023       Social History     Substance and Sexual Activity   Drug Use No       ROS:   GENERAL: Denies fatigue  CARDIOVASCULAR: Denies edema  GI: Denies abdominal pain  SKIN: Denies rash, itching   NEURO: Denies confusion, memory loss, or mood changes    PHYSICAL EXAM:   Friendly White male, in no acute distress; alert and oriented to person, place and time  VITALS: Ht 5' 6" (1.676 m)   Wt 80.2 kg (176 lb 12.9 oz)   BMI 28.54 kg/m²   EYES: Sclerae anicteric  GI: Soft, non-tender, non-distended. No ascites.  EXTREMITIES:  No edema.  SKIN: Warm and dry. No jaundice. No telangectasias noted. No palmar erythema.  NEURO:  No asterixis.  PSYCH:  Thought and speech pattern appropriate. Behavior normal      EDUCATION:  See instructions discussed with patient in Instructions section of the After Visit Summary     ASSESSMENT & PLAN:  68 y.o. White male " with:  1.  Fatty liver, likely related to alcohol   - transaminases to be repeated today   - Synthetic liver function WNL  -- Previous serologic w/u in HPI  - risk factors for fatty liver disease include alcohol use    -- Fibroscan 4/2023 noted F0, S3 - repeat with RTC in 6 months   -- Recommendations discussed with patient:  Limit alcohol consumption, no more than 1-2 servings of alcohol in any day (1 serving is 5 ounces of wine, 12 ounces of beer, or 1.5 ounces of liquor) and do NOT recommend any daily alcohol use - may be beneficial to abstain completely given alcohol use history in the past  2  Maintain normal weight  3. Low carb/sugar, high fiber and protein diet  4. Exercise, 5 days per week, 30 minutes per day, as tolerated  5. Recommend good cholesterol, blood pressure, blood sugar levels    2. Liver lesion  New on US, needs tumor marker labs and MRI     3. 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 Low suspicion for clinical significant iron overload given carrier status but will continue to monitor with labs and refer to hematology if needed       Labs today,   MRI soon then   Follow up in about 6 months (around 7/10/2024). with labs and fibroscan before  Orders Placed This Encounter   Procedures    FibroScan [place] (Vibration Controlled Transient Elastography)    MRI Abdomen W WO Contrast    Cancer Antigen 19-9    CEA    Creatinine, serum        Thank you for allowing me to participate in the care of KASSANDRA Urrutia    I spent a total of 30 minutes on the day of the visit.This includes face to face time and non-face to face time preparing to see the patient (eg, review of tests), obtaining and/or reviewing separately obtained history, documenting clinical information in the electronic or other health record, independently interpreting results and communicating results to the patient/family/caregiver, and coordinating  care.         CC'ed note to:

## 2024-01-13 LAB
CLINICAL BIOCHEMIST REVIEW: NORMAL
PLPETH BLD-MCNC: 207 NG/ML
POPETH BLD-MCNC: 210 NG/ML

## 2024-01-22 ENCOUNTER — OFFICE VISIT (OUTPATIENT)
Dept: OTOLARYNGOLOGY | Facility: CLINIC | Age: 69
End: 2024-01-22
Payer: MEDICARE

## 2024-01-22 DIAGNOSIS — H61.23 BILATERAL IMPACTED CERUMEN: Primary | ICD-10-CM

## 2024-01-22 PROCEDURE — 69210 REMOVE IMPACTED EAR WAX UNI: CPT | Mod: PBBFAC | Performed by: OTOLARYNGOLOGY

## 2024-01-22 PROCEDURE — 99999 PR PBB SHADOW E&M-EST. PATIENT-LVL II: CPT | Mod: PBBFAC,,, | Performed by: OTOLARYNGOLOGY

## 2024-01-22 PROCEDURE — 99212 OFFICE O/P EST SF 10 MIN: CPT | Mod: 25,PBBFAC | Performed by: OTOLARYNGOLOGY

## 2024-01-22 PROCEDURE — 69210 REMOVE IMPACTED EAR WAX UNI: CPT | Mod: S$PBB,,, | Performed by: OTOLARYNGOLOGY

## 2024-01-22 PROCEDURE — 99499 UNLISTED E&M SERVICE: CPT | Mod: S$PBB,,, | Performed by: OTOLARYNGOLOGY

## 2024-01-22 NOTE — PROGRESS NOTES
Has Jeremy CI.    AS>AD.    Procedure Note:    Patient was brought to the minor procedure room and using the operating microscope the right ear canal  was cleaned of ceruminous debris. There was a significant cerumen impaction.  The forceps and suction were both used to perform this. Tympanic membrane intact. Pt tolerated well. There were no complications.    Procedure Note:    The patient was brought to the minor procedure room and placed under the operating microscope. Using a combination of suction, curettes and cup forceps the patient's cerumen impaction was removed. The tympanic membrane was evaluated and was unremarkable. The patient tolerated the procedure well. There were no complications.    P: Baby oil q wk.    F/U prn.

## 2024-01-27 ENCOUNTER — HOSPITAL ENCOUNTER (OUTPATIENT)
Dept: RADIOLOGY | Facility: HOSPITAL | Age: 69
Discharge: HOME | End: 2024-01-27
Attending: NURSE PRACTITIONER
Payer: MEDICARE

## 2024-01-27 DIAGNOSIS — K76.9 LIVER LESION: ICD-10-CM

## 2024-01-27 DIAGNOSIS — K76.9 LIVER DISEASE, UNSPECIFIED: ICD-10-CM

## 2024-01-27 PROCEDURE — A9585 GADOBUTROL INJECTION: HCPCS | Performed by: NURSE PRACTITIONER

## 2024-01-27 PROCEDURE — 25500020 PHARM REV CODE 255: Performed by: NURSE PRACTITIONER

## 2024-01-27 PROCEDURE — 74183 MRI ABD W/O CNTR FLWD CNTR: CPT | Mod: TC

## 2024-01-27 PROCEDURE — 74183 MRI ABD W/O CNTR FLWD CNTR: CPT | Mod: 26,,, | Performed by: RADIOLOGY

## 2024-01-27 RX ORDER — GADOBUTROL 604.72 MG/ML
10 INJECTION INTRAVENOUS
Status: COMPLETED | OUTPATIENT
Start: 2024-01-27 | End: 2024-01-27

## 2024-01-27 RX ADMIN — GADOBUTROL 10 ML: 604.72 INJECTION INTRAVENOUS at 02:01

## 2024-01-31 ENCOUNTER — TELEPHONE (OUTPATIENT)
Dept: HEPATOLOGY | Facility: CLINIC | Age: 69
End: 2024-01-31
Payer: MEDICARE

## 2024-01-31 NOTE — TELEPHONE ENCOUNTER
Called pt, MRI notes hemangioma, CEA and Ca 19-9 WNL, no concerning findings. F/u visit and fibroscan in July as scheduled. Pt verbalized understanding.

## 2024-03-20 ENCOUNTER — HOSPITAL ENCOUNTER (EMERGENCY)
Facility: HOSPITAL | Age: 69
Discharge: HOME | End: 2024-03-20
Attending: EMERGENCY MEDICINE
Payer: MEDICARE

## 2024-03-20 ENCOUNTER — NURSE TRIAGE (OUTPATIENT)
Dept: ADMINISTRATIVE | Facility: CLINIC | Age: 69
End: 2024-03-20
Payer: MEDICARE

## 2024-03-20 ENCOUNTER — TELEPHONE (OUTPATIENT)
Dept: PULMONOLOGY | Facility: CLINIC | Age: 69
End: 2024-03-20
Payer: MEDICARE

## 2024-03-20 VITALS
TEMPERATURE: 98 F | BODY MASS INDEX: 25.18 KG/M2 | DIASTOLIC BLOOD PRESSURE: 113 MMHG | WEIGHT: 170 LBS | RESPIRATION RATE: 16 BRPM | HEART RATE: 69 BPM | SYSTOLIC BLOOD PRESSURE: 189 MMHG | OXYGEN SATURATION: 99 % | HEIGHT: 69 IN

## 2024-03-20 DIAGNOSIS — J43.2 CENTRILOBULAR EMPHYSEMA: ICD-10-CM

## 2024-03-20 DIAGNOSIS — S69.91XA HAND INJURY, RIGHT, INITIAL ENCOUNTER: ICD-10-CM

## 2024-03-20 DIAGNOSIS — S92.415B OPEN NONDISPLACED FRACTURE OF PROXIMAL PHALANX OF LEFT GREAT TOE, INITIAL ENCOUNTER: Primary | ICD-10-CM

## 2024-03-20 DIAGNOSIS — S99.922A INJURY OF LEFT FOOT, INITIAL ENCOUNTER: ICD-10-CM

## 2024-03-20 PROCEDURE — 63600175 PHARM REV CODE 636 W HCPCS: Performed by: PHYSICIAN ASSISTANT

## 2024-03-20 PROCEDURE — 99284 EMERGENCY DEPT VISIT MOD MDM: CPT | Mod: 25

## 2024-03-20 PROCEDURE — 96365 THER/PROPH/DIAG IV INF INIT: CPT

## 2024-03-20 PROCEDURE — 25000003 PHARM REV CODE 250: Performed by: PHYSICIAN ASSISTANT

## 2024-03-20 RX ORDER — SULFAMETHOXAZOLE AND TRIMETHOPRIM 800; 160 MG/1; MG/1
1 TABLET ORAL 2 TIMES DAILY
Qty: 14 TABLET | Refills: 0 | Status: SHIPPED | OUTPATIENT
Start: 2024-03-20 | End: 2024-03-27

## 2024-03-20 RX ORDER — LISINOPRIL 20 MG/1
20 TABLET ORAL
Status: COMPLETED | OUTPATIENT
Start: 2024-03-20 | End: 2024-03-20

## 2024-03-20 RX ORDER — TIOTROPIUM BROMIDE 18 UG/1
CAPSULE ORAL; RESPIRATORY (INHALATION)
Qty: 30 CAPSULE | Refills: 1 | Status: SHIPPED | OUTPATIENT
Start: 2024-03-20 | End: 2024-06-13

## 2024-03-20 RX ADMIN — LISINOPRIL 20 MG: 20 TABLET ORAL at 08:03

## 2024-03-20 RX ADMIN — CEFAZOLIN 2 G: 2 INJECTION, POWDER, FOR SOLUTION INTRAMUSCULAR; INTRAVENOUS at 08:03

## 2024-03-20 NOTE — TELEPHONE ENCOUNTER
Care Due:                  Date            Visit Type   Department     Provider  --------------------------------------------------------------------------------    Last Visit: None Found      None         None Found  Next Visit: None Scheduled  None         None Found                                                            Last  Test          Frequency    Reason                     Performed    Due Date  --------------------------------------------------------------------------------    Office Visit  15 months..  lisinopriL...............  Not Found    Overdue    [employer] Embedded Care Due Messages. Reference number: [id number].   3/20/2024 4:27:58 PM CDT

## 2024-03-20 NOTE — TELEPHONE ENCOUNTER
[place]    PCP:  Dr. Yasmin Prather    Pt [facility] for S/P toe injury yesterday, was bleeding, and he bandaged it.  [facility] offered an appt but he didn't want the appt and wants to speak to NOC.  NT spoke with pt.  C/O laceration/swelling to Rt great toe ~ 1.5 in that bled a lot yesterday (bleeding stopped at this time but he's concerned that when he removes the dressing it's going to start bleeding again d/t wound being deep), little finger on Rt hand was also lacerated/swollen (bled but bleeding has stopped - he's also concerned that removing the dressing will cause it to bleed again).  He reports that he fell on stepping stone yesterday and that was how he injured his toe and finger.  Denies crooked/deformed, currently bleeding, drainage, and looks infected.  Per protocol, care advised is go to ED now.  Pt VU.  Advised to call for worsening/questions/concerns.  VU.    Reason for Disposition   Skin is split open or gaping (or length > 1/2 inch or 12 mm)    Additional Information   Negative: [1] Major bleeding (e.g., spurting blood) AND [2] can't be stopped   Negative: Amputated toe    Protocols used: Toe Injury-A-AH

## 2024-03-20 NOTE — TELEPHONE ENCOUNTER
----- Message from Ruma Denton sent at 3/20/2024  4:18 PM CDT -----  Contact: [phone]  Requesting an RX refill or new RX.  Is this a refill or new RX: Refill 1  RX name and strength (copy/paste from chart):  tiotropium (SPIRIVA) 18 mcg inhalation capsule  Is this a 30 day or 90 day RX:   Pharmacy name and phone # (copy/paste from chart):  [id number] - [place], [place] - [facility] [address] AT [facility] [address] [place] [address]   Phone: [phone]  Fax: [phone]        The doctors have asked that we provide their patients with the following 2 reminders -- prescription refills can take up to 72 hours, and a friendly reminder that in the future you can use your [facility] account to request refills:

## 2024-03-20 NOTE — ED PROVIDER NOTES
"Encounter Date: 3/20/2024       History     Chief Complaint   Patient presents with    Fall     Toe injury left great toe and it was "bleeding so bad". Not on a blood thinner    Toe Injury     The history is provided by the patient and medical records. No  was used.     Nilo Hoover is a 68 y.o. male with medical history of COPD, HTN presenting to the ED with the chief complaint of fall.     Experienced a mechanical ground-level fall last night while walking outside to his shed to do laundry. Flip-flop caught on a stepping stone causing him to fall forward. Denies head trauma or LOC. Reports injuring his L great toe and R 4th and 5th fingers. Denies blood thinner use. Reports having persistent bleeding to his L great toe which prompted his ED visit today. He does not recall his last tetanus. He drove himself to the ED without difficulty. Denies headache, neck pain, chest pain, SOB, abdominal pain, vomiting, urinary or bowel movement changes.     Review of patient's allergies indicates:  No Known Allergies  Past Medical History:   Diagnosis Date    Bronchitis chronic     COPD (chronic obstructive pulmonary disease)     Degenerative disc disease     lumar spine     Erectile dysfunction     GERD (gastroesophageal reflux disease)     hx gastritis    H/O: GI bleed     HTN (hypertension) 2/26/2013    Personal history of colonic polyps 8/9    Seizures      Past Surgical History:   Procedure Laterality Date    COLONOSCOPY N/A 9/21/2018    Procedure: COLONOSCOPY;  Surgeon: Minesh Garzon MD;  Location: [address];  Service: Endoscopy;  Laterality: N/A;  3 year f/u-past due- history of colon polyps    COLONOSCOPY W/ POLYPECTOMY      ESOPHAGOGASTRODUODENOSCOPY      TONSILLECTOMY       Family History   Problem Relation Age of Onset    Heart disease Mother     Cancer Mother         colon     COPD Father     Emphysema Father     Seizures Son     Seizures Brother     Hypertension Brother  "     Social History     Tobacco Use    Smoking status: Former     Current packs/day: 0.00     Average packs/day: 1.5 packs/day for 35.0 years (52.5 ttl pk-yrs)     Types: Cigarettes     Start date: 2/4/1974     Quit date: 2/4/2009     Years since quitting: 15.1    Smokeless tobacco: Never   Substance Use Topics    Alcohol use: Yes     Comment: revious history of heavy/daily alcohol use on and off for many years, currently drinking 2-3 servings most nights of the week with some periods of abstinence    Drug use: No     Review of Systems   Musculoskeletal:  Positive for arthralgias.   Skin:  Positive for wound.       Physical Exam     Initial Vitals [03/20/24 1708]   BP Pulse Resp Temp SpO2   (!) 209/98 97 20 97.6 °F (36.4 °C) 96 %      MAP       --         Physical Exam    Constitutional: He appears well-developed and well-nourished. He is not diaphoretic. No distress.   HENT:   Head: Normocephalic and atraumatic.   Mouth/Throat: Oropharynx is clear and moist.   Eyes: EOM are normal. Pupils are equal, round, and reactive to light.   Neck:   Normal range of motion.  Cardiovascular:  Normal rate and regular rhythm.           Pulmonary/Chest: No respiratory distress.   Musculoskeletal:         General: Normal range of motion.      Cervical back: Normal range of motion.      Comments: Small 0.5 cm laceration to L medial great toe with surrounding mascerated tissue. Ecchymosis around nail edge. Bleeding control.  Eschar to tip of R 5th finger. Masceration around nail. Limited ROM of R 5th finger.  No subungual hematoma.      Neurological: He is alert and oriented to person, place, and time.   Skin: Skin is warm and dry. No rash noted.                             ED Course   Procedures  Labs Reviewed   HIV 1 / 2 ANTIBODY   HEPATITIS C ANTIBODY          Imaging Results               XR Weight Bearing Foot Complete 3+V Left (Final result)  Result time 03/20/24 22:19:44      Final result by Onel Artis MD (03/20/24  22:19:44)                   Impression:      Small acute fracture of the proximal medial margin of the proximal phalanx of the 1st digit.  See above comments.  Follow-up recommended.    This report was flagged in Epic as abnormal.      Electronically signed by: Onel Bach  Date:    03/20/2024  Time:    22:19               Narrative:    EXAMINATION:  XR WEIGHT BEARING FOOT COMPLETE 3+ VIEW LEFT    CLINICAL HISTORY:  L foot injury;    TECHNIQUE:  Three views of the left foot, weight-bearing    COMPARISON:  None    FINDINGS:  Osseous demineralization.    Small acute corner fracture of the proximal medial margin of the proximal phalanx of the 1st digit.  There is articular extension and minimal displacement.  Recommend follow-up.    No acute abnormality elsewhere.                                       X-Ray Hand 2 View Right (Final result)  Result time 03/20/24 19:32:11      Final result by Steven Dubois DO (03/20/24 19:32:11)                   Impression:      No acute osseous abnormality of the hand.      Electronically signed by: Steven Dubois  Date:    03/20/2024  Time:    19:32               Narrative:    EXAMINATION:  XR HAND 2 VIEW RIGHT    CLINICAL HISTORY:  Unspecified injury of right wrist, hand and finger(s), initial encounter    TECHNIQUE:  PA, oblique, and lateral radiographs of the right hand.    COMPARISON:  09/05/2007.    FINDINGS:  There is osteopenia.  There is no acute fracture or dislocation.  Alignment is normal.  There is joint space narrowing of the 1st through 3rd metacarpophalangeal joints.                                       X-Ray Foot Complete Left (Final result)  Result time 03/20/24 19:40:29   Procedure changed from X-Ray Foot Complete Right     Final result by Steven Dubois DO (03/20/24 19:40:29)                   Impression:      1. Questionable malalignment of the Lisfranc articulation.  Recommend further evaluation with weight-bearing views.  2. Suspected nondisplaced  fracture of the medial aspect of the great toe proximal phalangeal base.  Correlate with point tenderness.      Electronically signed by: Steven Dubois  Date:    03/20/2024  Time:    19:40               Narrative:    EXAMINATION:  XR FOOT COMPLETE 3 VIEW LEFT    CLINICAL HISTORY:  left foot injury;.  Unspecified injury of right foot, initial encounter    TECHNIQUE:  AP, lateral and oblique views of the left foot were performed.    COMPARISON:  None    FINDINGS:  There is questionable malalignment of the Lisfranc articulation.  There is a suspected nondisplaced fracture of the medial aspect of the great toe proximal phalangeal base.  Recommend correlation with point tenderness.  No additional fractures are seen.  Joint spaces are preserved.  There is soft tissue edema of the forefoot.                                       Medications   Tdap (BOOSTRIX) vaccine injection 0.5 mL (has no administration in time range)   ceFAZolin 2 g in dextrose 5 % in water (D5W) 50 mL IVPB (MB+) (0 g Intravenous Stopped 3/20/24 2054)   lisinopriL tablet 20 mg (20 mg Oral Given 3/20/24 2021)     Medical Decision Making  68 y.o. male with medical history of COPD, HTN presenting to the ED c/o R 5th finger and L great toe injury occurring last night after a fall.     DDx includes but not limited to fracture, dislocation, contusion, muscular strain, ligament injury. Small laceration noted to L great toe on exam. Do not feel suture repair warranted given length of time since injury and size. Bleeding is controled. No evidence of subungual hematoma. Will check XR of musculoskeletal injuries. Update Tetanus ordered. Denies head trauma and no evidence of calvarium trauma on exam. No headache, neck pain, or blood thinner use. Deferring head and neck imaging at this time.     Amount and/or Complexity of Data Reviewed  Radiology: ordered and independent interpretation performed.    Risk  Prescription drug management.         APC / Resident Notes:    Toe and finger wounds extensively irrigated and cleaned at bedside. Dressings placed. XR showing proximal phalanx fracture of 1st digit. Will treat as open fracture as he has a small wound above this area. Ancef given in the ED. Tetanus update ordered. XR additionally noted questionable malalignment of lisfranc articulation. Discussed this with Orthopedics, weight bearing films obtained that do not show significant malalignment. He additionally does not have tenderness over this area and do not suspect Lisfranc fracture at this time. Okay for outpatient follow-up with podiatry regarding great toe injury. Ambulatory referral placed. RX for Bactrim provided for empirical coverage. Discussed wound care at home. CAM boot provided for ambulation assistance.                                   Clinical Impression:  Final diagnoses:  [S69.91XA] Hand injury, right, initial encounter  [S69.91XA] Hand injury, right, initial encounter - R 5th and 4th finger  [S99.922A] Injury of left foot, initial encounter  [S92.415B] Open nondisplaced fracture of proximal phalanx of left great toe, initial encounter (Primary)          ED Disposition Condition    Discharge Stable          ED Prescriptions       Medication Sig Dispense Start Date End Date Auth. Provider    sulfamethoxazole-trimethoprim 800-160mg (BACTRIM DS) 800-160 mg Tab Take 1 tablet by mouth 2 (two) times daily. for 7 days 14 tablet 3/20/2024 3/27/2024 Jon Fortune PA-C          Follow-up Information       Follow up With Specialties Details Why Contact Info Additional Information    [id number]   [address] [facility] [id number] [phone] [facility] - Main Building, 5th Floor Please park in [facility] [address] and use Atrium elevator             Jon Fortune PA-C  03/21/24 0048

## 2024-03-20 NOTE — TELEPHONE ENCOUNTER
Call was returned to Mr Hoover in regards to his medications list. Patient stated he need a refill on his tiotropium (SPIRIVA) 18 mcg inhalation capsule. I informed patient that the medication was prescribe by his PCP. Patient stated he will reach out to his PCP. I advised patient if he have any questions or concerns to contact our office. Office number was provided. Patient verbalized understanding.

## 2024-03-20 NOTE — TELEPHONE ENCOUNTER
----- Message from Viktoria Truong sent at 3/20/2024  3:41 PM CDT -----  Regarding: Rx Approval  Contact: [phone]  Nilo Hoover calling regarding Refills  (message) for # pt is calling to get a PA or a provider to authorize medication for     tiotropium (SPIRIVA) 18 mcg inhalation capsule    Pt is completely out of medication please call in script once approved       [id number] - [place] - [facility] [place] [address] AT [facility] & [place] [address] [place] [address]  Phone: [phone] Fax: [phone]

## 2024-03-21 ENCOUNTER — PATIENT OUTREACH (OUTPATIENT)
Dept: EMERGENCY MEDICINE | Facility: HOSPITAL | Age: 69
End: 2024-03-21
Payer: MEDICARE

## 2024-03-21 NOTE — PROGRESS NOTES
Call placed to Pt to f/u from recent ED visit. No answer x 2 attempts. Encounter to be closed at this time.

## 2024-03-21 NOTE — DISCHARGE INSTRUCTIONS
Follow-up with podiatry for further evaluation of your toe wound. Use the below contact information to obtain an appointment.  Use the provided walking boot for ambulation assistance  Take the prescribed Bactrim to prevent an infection  Change your dressings twice daily or if they become soiled  You may take Tylenol as needed for pain    Follow-up with your primary care provider for further evaluation. Return to the emergency room for new, worsening, or concerning symptoms.     Future Appointments   Date Time Provider Department Center   7/10/2024  1:00 PM [facility] HEPATOLOGY, FIBROSCAN [facility] HEPAT Cristhian fabiola   7/10/2024  1:30 PM Marcie Ignacio NP [facility] HEPAT [facility] Leyva

## 2024-03-21 NOTE — TELEPHONE ENCOUNTER
I spoke with Mr Hoover in regards to scheduling an follow up visit. Patient is scheduled on 6/13/24 at 11:30 AM with Dr Perdomo. Patient is added to the waiting list. Appointment mailed. Patient confirmed and verbalized understanding.

## 2024-03-21 NOTE — TELEPHONE ENCOUNTER
----- Message from Jacques Perdomo MD sent at 3/21/2024 11:57 AM CDT -----  Regarding: follow up  Shesuzi ,       Can we get Mr. Hoover a follow up appointment whenever we are able to fit him in?     Thanks,   Jacques

## 2024-03-30 DIAGNOSIS — I10 ESSENTIAL HYPERTENSION: ICD-10-CM

## 2024-03-30 NOTE — TELEPHONE ENCOUNTER
No care due was identified.  [facility] Embedded Care Due Messages. Reference number: [id number].   3/30/2024 5:27:40 AM CDT

## 2024-04-01 ENCOUNTER — OFFICE VISIT (OUTPATIENT)
Dept: PODIATRY | Facility: CLINIC | Age: 69
End: 2024-04-01
Payer: MEDICARE

## 2024-04-01 VITALS
HEART RATE: 80 BPM | SYSTOLIC BLOOD PRESSURE: 176 MMHG | HEIGHT: 69 IN | RESPIRATION RATE: 19 BRPM | BODY MASS INDEX: 25.77 KG/M2 | WEIGHT: 174 LBS | DIASTOLIC BLOOD PRESSURE: 92 MMHG

## 2024-04-01 DIAGNOSIS — S90.212A SUBUNGUAL HEMATOMA OF GREAT TOE OF LEFT FOOT, INITIAL ENCOUNTER: Primary | ICD-10-CM

## 2024-04-01 DIAGNOSIS — S92.415B OPEN NONDISPLACED FRACTURE OF PROXIMAL PHALANX OF LEFT GREAT TOE, INITIAL ENCOUNTER: ICD-10-CM

## 2024-04-01 PROCEDURE — 99204 OFFICE O/P NEW MOD 45 MIN: CPT | Mod: S$PBB,,, | Performed by: PODIATRIST

## 2024-04-01 PROCEDURE — 99215 OFFICE O/P EST HI 40 MIN: CPT | Mod: PBBFAC | Performed by: PODIATRIST

## 2024-04-01 PROCEDURE — 99999 PR PBB SHADOW E&M-EST. PATIENT-LVL V: CPT | Mod: PBBFAC,,, | Performed by: PODIATRIST

## 2024-04-01 RX ORDER — LISINOPRIL 20 MG/1
TABLET ORAL
Qty: 90 TABLET | Refills: 0 | Status: SHIPPED | OUTPATIENT
Start: 2024-04-01

## 2024-04-01 NOTE — PATIENT INSTRUCTIONS
Wound care Instructions:   Beginning in 24 hours::   Clean the toe separate from your body with warm running water and antibacterial soap such as dial.  Clean any remaining crust away with soap and water using a cotton-tipped applicator.  DRY COMPLETELY  Apply betadine to the toe twice a day.  Cover with a breathable bandage until there is no more drainage or open flesh.  Change the dressing daily, or whenever it becomes wet or dirty.  If you were prescribed antibiotics, take them as directed until they are all gone.  Wear darco shoe  You may use acetaminophen or ibuprofen to control pain, unless another medicine was prescribed. If you have chronic liver or kidney disease or ever had a stomach ulcer or GI bleeding, talk with your doctor before using these medicines.      When to seek medical advice  Call your health care provider right away if any of the following occur:  Increasing redness, pain or swelling of the toe  Red streaks in the skin leading away from the wound  Continued pus or fluid drainage for more than 24 hours  Fever of 100.4º F (38º C) or higher, or as directed by your health care provider

## 2024-04-03 RX ORDER — UMECLIDINIUM 62.5 UG/1
AEROSOL, POWDER ORAL
Qty: 30 EACH | Refills: 11 | Status: SHIPPED | OUTPATIENT
Start: 2024-04-03

## 2024-04-15 ENCOUNTER — OFFICE VISIT (OUTPATIENT)
Dept: PODIATRY | Facility: CLINIC | Age: 69
End: 2024-04-15
Payer: MEDICARE

## 2024-04-15 VITALS — BODY MASS INDEX: 26.96 KG/M2 | WEIGHT: 182.56 LBS

## 2024-04-15 DIAGNOSIS — S90.212D SUBUNGUAL HEMATOMA OF GREAT TOE OF LEFT FOOT, SUBSEQUENT ENCOUNTER: ICD-10-CM

## 2024-04-15 DIAGNOSIS — S92.415D: Primary | ICD-10-CM

## 2024-04-15 PROCEDURE — 99213 OFFICE O/P EST LOW 20 MIN: CPT | Mod: PBBFAC | Performed by: PODIATRIST

## 2024-04-15 PROCEDURE — 99999 PR PBB SHADOW E&M-EST. PATIENT-LVL III: CPT | Mod: PBBFAC,,, | Performed by: PODIATRIST

## 2024-04-15 PROCEDURE — 99213 OFFICE O/P EST LOW 20 MIN: CPT | Mod: S$PBB,,, | Performed by: PODIATRIST

## 2024-04-15 NOTE — PATIENT INSTRUCTIONS
Over the counter pain creams: Voltaren Gel, Biofreeze, Bengay, tiger balm, two old goat, lidocaine gel,  Absorbine Veterinary Liniment Gel Topical Analgesic Sore Muscle and Joint Pain Relief    Recommend lotions: eucerin, eucerin for diabetics, aquaphor, A&D ointment, gold bond for diabetics, sween, Jerusalem's Bees all purpose baby ointment,  urea 40 with aloe or SkinIntegra rapid crack repair (found on amazon.com)    Shoe recommendations: (try 6pm.com, zappos.com , nordstromrack.com, or shoes.com for discounted prices) you can visit varsity shoes in [place], DSW shoes in [place]  or megan rack in the [facility] (there are also several shoe brand outlets in the [facility])    ONLY purchase stability style tennis shoes NO flex, foam, free, yoga mat style shoes    Shoe examples:    Asics (GT 4000 or gel foundations), new balance stability type shoes (such as the 940 series), saucony (stabil c3),  Nash (GTS or Beast or   transcend), propet, HokaOne (tennis shoe) Jarocho (tennis shoes and boots)    Sofft Brand (women) Mimi&Wilian (men), clarks, jason, aerosoles, naturalizers, SAS, ecco, born, dolores mendez, rockports (dress shoes)    Vionic, burkenstocks, fitflops, propet, taos, baretraps (sandals)    HokaOne sandals, crocs, propet (house shoes)      Step-by-Step:  Inspecting Your Feet     Date Last Reviewed: 10/1/2016  © 9473-2751 [employer]. [address] [place] [address], [place] [address]. All rights reserved. This information is not intended as a substitute for professional medical care. Always follow your healthcare professional's instructions.

## 2024-04-15 NOTE — PROGRESS NOTES
Subjective:      Patient ID: Nilo Hoover is a 68 y.o. male.    Chief Complaint: No chief complaint on file.    Nilo Hoover is a 68 y.o. male with  has a past medical history of Bronchitis chronic, COPD (chronic obstructive pulmonary disease), Degenerative disc disease, Erectile dysfunction, GERD (gastroesophageal reflux disease), H/O: GI bleed, HTN (hypertension), Personal history of colonic polyps, and Seizures. presents to the podiatry clinic for care of open fracture to left hallux.  Experienced a mechanical ground-level fall 03/19/24 while walking outside to his shed to do laundry. Flip-flop caught on a stepping sone causing him to fall forward. Denies head trauma or LOC. Reports injuring his L great toe, 2nd digit and R 4th and 5th fingers. Denies blood thinner use. Reports having persistent bleeding to his L great toe which prompted his ED visit. In the ED he  was found t have hallux fracture and placed on Bactrim.  Relates he was never given a boot or special shoe.  Has been doing home care with peroxide and bandages. Presents in tennis shoes    04/15/2024 patient returns to clinic for follow-up subungual hematoma and abrasions of the left hallux.  He has been caring for the toe as instructed with Betadine twice daily.  He relates to walking in the home with just socks and admits to raking the leaves and doing some household chores.  Overall he feels as if the toe has improved but does have some pain with ambulation.  He denies seeing any drainage.  He denies nausea, vomiting, fever, chills.  No new pedal complaints.    No chief complaint on file.      Patient Active Problem List   Diagnosis    Personal history of colonic polyps    COPD (chronic obstructive pulmonary disease)    Seizures    GERD (gastroesophageal reflux disease)    Primary hypertension    Fatty liver    History of alcohol abuse    History of colon polyps    Elevated ferritin    Carrier of hemochromatosis HFE gene mutation     Aortic atherosclerosis    Liver lesion       Current Outpatient Medications on File Prior to Visit   Medication Sig Dispense Refill    albuterol (PROVENTIL/VENTOLIN HFA) 90 mcg/actuation inhaler INHALE 2 PUFFS INTO THE LUNGS EVERY 6 HOURS AS NEEDED FOR WHEEZING OR SHORTNESS OF BREATH 8.5 g 6    benzonatate (TESSALON) 100 MG capsule Take 2 capsules (200 mg total) by mouth 3 (three) times daily as needed for Cough. 30 capsule 0    fluticasone furoate-vilanteroL (BREO ELLIPTA) 200-25 mcg/dose DsDv diskus inhaler Inhale 1 puff into the lungs once daily. Controller 1 each 5    INCRUSE ELLIPTA 62.5 mcg/actuation inhalation capsule INHALE 1 PUFF INTO THE LUNGS EVERY DAY 30 each 11    ipratropium (ATROVENT) 42 mcg (0.06 %) nasal spray 2 sprays by Nasal route 3 (three) times daily. 30 mL 0    lisinopriL (PRINIVIL,ZESTRIL) 20 MG tablet TAKE 1 TABLET BY MOUTH EVERY DAY NEED APPOINTMENT FOR REFILL 90 tablet 0    omeprazole 20 mg TbEC Take by mouth. 1 Tablet, Delayed Release (E.C.) Oral Every day      predniSONE (DELTASONE) 20 MG tablet Take 1 tablet (20 mg total) by mouth 2 (two) times daily. 10 tablet 0    SYMBICORT 160-4.5 mcg/actuation HFAA INHALE 2 PUFFS INTO THE LUNGS EVERY 12 HOURS 10.2 g 12    tamsulosin (FLOMAX) 0.4 mg Cap TAKE 1 CAPSULE(0.4 MG) BY MOUTH EVERY DAY 30 capsule 12    tamsulosin (FLOMAX) 0.4 mg Cap Take 1 capsule (0.4 mg total) by mouth once daily. 30 capsule 12    tiotropium (SPIRIVA WITH HANDIHALER) 18 mcg inhalation capsule INHALE THE CONTENTS OF 1 CAPSULE(18 MCG) INTO THE LUNGS EVERY DAY Appointment needed 30 capsule 1    tiotropium (SPIRIVA) 18 mcg inhalation capsule Inhale 18 mcg into the lungs once daily. Controller      tiotropium (SPIRIVA) 18 mcg inhalation capsule Inhale 1 capsule (18mcg) into the lungs once daily. 30 capsule 0     No current facility-administered medications on file prior to visit.       Review of patient's allergies indicates:  No Known Allergies    Past Surgical History:    Procedure Laterality Date    COLONOSCOPY N/A 9/21/2018    Procedure: COLONOSCOPY;  Surgeon: Minesh Garzon MD;  Location: [facility] ([address] [place] [address]);  Service: Endoscopy;  Laterality: N/A;  3 year f/u-past due- history of colon polyps    COLONOSCOPY W/ POLYPECTOMY      ESOPHAGOGASTRODUODENOSCOPY      TONSILLECTOMY         Family History   Problem Relation Name Age of Onset    Heart disease Mother      Cancer Mother          colon     COPD Father      Emphysema Father      Seizures Son      Seizures Brother      Hypertension Brother         Social History     Socioeconomic History    Marital status: Single   Occupational History     Employer: [employer]   Tobacco Use    Smoking status: Former     Current packs/day: 0.00     Average packs/day: 1.5 packs/day for 35.0 years (52.5 ttl pk-yrs)     Types: Cigarettes     Start date: 2/4/1974     Quit date: 2/4/2009     Years since quitting: 15.2    Smokeless tobacco: Never   Substance and Sexual Activity    Alcohol use: Yes     Comment: revious history of heavy/daily alcohol use on and off for many years, currently drinking 2-3 servings most nights of the week with some periods of abstinence    Drug use: No     Social Determinants of Health     Financial Resource Strain: Low Risk  (8/23/2023)    Overall Financial Resource Strain (CARDIA)     Difficulty of Paying Living Expenses: Not hard at all   Food Insecurity: No Food Insecurity (8/23/2023)    Hunger Vital Sign     Worried About Running Out of Food in the Last Year: Never true     Ran Out of Food in the Last Year: Never true   Transportation Needs: No Transportation Needs (8/23/2023)    PRAPARE - Transportation     Lack of Transportation (Medical): No     Lack of Transportation (Non-Medical): No   Physical Activity: Insufficiently Active (8/23/2023)    Exercise Vital Sign     Days of Exercise per Week: 2 days     Minutes of Exercise per Session: 20 min   Housing Stability: Low Risk  (8/23/2023)     Housing Stability Vital Sign     Unable to Pay for Housing in the Last Year: No     Number of Places Lived in the Last Year: 1     Unstable Housing in the Last Year: No       Review of Systems   Constitutional: Negative for chills and fever.   Cardiovascular:  Negative for claudication and leg swelling.   Respiratory:  Positive for cough and shortness of breath.         COPD   Endocrine: Positive for cold intolerance.   Skin:  Positive for dry skin. Negative for itching and rash.   Musculoskeletal:  Positive for falls, joint pain, myalgias and stiffness. Negative for joint swelling and muscle weakness.   Gastrointestinal:  Negative for diarrhea, nausea and vomiting.   Neurological:  Negative for numbness, paresthesias, tremors and weakness.   Psychiatric/Behavioral:  Negative for altered mental status and hallucinations.            Objective:      Vitals:    04/15/24 1047   Weight: 82.8 kg (182 lb 8.7 oz)         Physical Exam  Vitals and nursing note reviewed.   Constitutional:       General: He is not in acute distress.     Appearance: He is well-developed. He is not toxic-appearing or diaphoretic.      Comments: alert and oriented x 3.    Cardiovascular:      Pulses:           Dorsalis pedis pulses are 2+ on the right side and 2+ on the left side.        Posterior tibial pulses are 1+ on the right side and 1+ on the left side.      Comments:  Capillary refill time is within normal limits. Digital hair present.   Pulmonary:      Effort: No respiratory distress.   Musculoskeletal:         General: No deformity.      Right ankle: No tenderness. No lateral malleolus, medial malleolus, AITF ligament, CF ligament or posterior TF ligament tenderness.      Right Achilles Tendon: No defects. Dennis's test negative.      Left ankle: No tenderness. No lateral malleolus, medial malleolus, AITF ligament, CF ligament or posterior TF ligament tenderness.      Left Achilles Tendon: No defects. Dennis's test negative.       Right foot: No tenderness or bony tenderness.      Left foot: Swelling, tenderness (hallux) and bony tenderness (phalanx of hallux) present.      Comments: Muscle strength is 5/5 in all groups bilaterally.           Feet:      Right foot:      Skin integrity: Skin integrity normal.      Left foot:      Skin integrity: Skin integrity normal.   Lymphadenopathy:      Comments: No lymphatic streaking     Skin:     General: Skin is warm and dry.      Coloration: Skin is not pale.      Findings: Ecchymosis (resolved to left hallux) present. No rash.      Nails: There is no clubbing.   Neurological:      Sensory: No sensory deficit.      Motor: No atrophy.      Comments: Light touch present     Psychiatric:         Attention and Perception: He is attentive.         Mood and Affect: Mood is not anxious. Affect is not inappropriate.         Speech: He is communicative. Speech is not slurred.         Behavior: Behavior is not combative.         04/15/2024         04/01/24                  Assessment:       Encounter Diagnoses   Name Primary?    Open nondisplaced fracture of proximal phalanx of left great toe with routine healing, subsequent encounter Yes    Subungual hematoma of great toe of left foot, subsequent encounter            Plan:     Problem List Items Addressed This Visit    None  Visit Diagnoses       Open nondisplaced fracture of proximal phalanx of left great toe with routine healing, subsequent encounter    -  Primary    Relevant Orders    X-Ray Foot Complete Left    Subungual hematoma of great toe of left foot, subsequent encounter        Relevant Orders    X-Ray Foot Complete Left             I counseled the patient on his conditions, their implications and medical management.      Personally and independently visualized and interpreted imaging with patient. Discussed my interpretation in detail, answering patient inquiries. I see the proximal phalanx fracture noted by radiologist, but also the distal hallux  has changes consistent with fracture.    Encouraged the use of previously dispensed Darco shoe for immobilization. Encouraged patient to rest and keep foot elevated to reduce pain and swelling.. Patient instructed on adequate icing techniques. Patient should ice the affected area at least once per day x 10 minutes for 10 days . I advised the  patient that extra icing would also be beneficial to ensure adequate anti inflammatory effect.     Informed patient that the nail may separate and fall off in the next few weeks. In almost all cases, the nail will grow back from under the cuticle. This takes a few weeks to start and is complete in about 6-12 months for a toenail. If the nail bed was damaged, the nail may grow back with a rough or irregular shape. Sometimes the nail may not regrow at all.    Education about the prevention of limb loss with open fracture.    Discussed wound healing cycle, skin integrity, ways to care for skin. Counseled patient on the effects of biomechanical pressure healing. He verbalizes understanding that it can increase the chances of delayed healing and this prolonged exposure leads to infection or progression of infection which subsequently can result in loss of limb.    Adequate vitamin supplementation, protein intake, and hydration - discussed with patient    Continue home care as previously discussed     Patient is to obtain repeat x-rays to assess fracture line on prior to next encounter    Follow-up: 4 weeks but should call [facility] immediately if any signs of infection, such as fever, chills, sweats, increased redness or pain.    Short-term goals include maintaining good offloading and minimizing bioburden, promoting granulation and epithelialization to healing.  Long-term goals include keeping the wound healed by good offloading and medical management under the direction of internist.    I spent a total of 42 minutes on the day of the visit.This includes face to face time and  non-face to face time preparing to see the patient (eg, review of tests), obtaining and/or reviewing separately obtained history, documenting clinical information in the electronic or other health record, independently interpreting results and communicating results to the patient/family/caregiver, or care coordinator.     Procedures

## 2024-05-12 NOTE — PROGRESS NOTES
Subjective:      Patient ID: Nilo Hoover is a 68 y.o. male.    Chief Complaint: Foot Pain (F/u fracture big toe)    Nilo Hoover is a 68 y.o. male with  has a past medical history of Bronchitis chronic, COPD (chronic obstructive pulmonary disease), Degenerative disc disease, Erectile dysfunction, GERD (gastroesophageal reflux disease), H/O: GI bleed, HTN (hypertension), Personal history of colonic polyps, and Seizures. presents to the podiatry clinic for care of open fracture to left hallux.  Experienced a mechanical ground-level fall 03/19/24 while walking outside to his shed to do laundry. Flip-flop caught on a stepping sone causing him to fall forward. Denies head trauma or LOC. Reports injuring his L great toe, 2nd digit and R 4th and 5th fingers. Denies blood thinner use. Reports having persistent bleeding to his L great toe which prompted his ED visit. In the ED he  was found t have hallux fracture and placed on Bactrim.  Relates he was never given a boot or special shoe.  Has been doing home care with peroxide and bandages. Presents in tennis shoes    04/15/2024 patient returns to clinic for follow-up subungual hematoma and abrasions of the left hallux.  He has been caring for the toe as instructed with Betadine twice daily.  He relates to walking in the home with just socks and admits to raking the leaves and doing some household chores.  Overall he feels as if the toe has improved but does have some pain with ambulation.  He denies seeing any drainage.  He denies nausea, vomiting, fever, chills.  No new pedal complaints.    05/13/2024 patient returns to clinic for follow-up subungual hematoma and abrasions of the left hallux.  He has been caring for the toe as instructed with Betadine twice daily.  Overall he feels as if the toe has improved but does not like the appearance of the nail.  He denies seeing any drainage.  He denies nausea, vomiting, fever, chills.  No new pedal complaints. He has  returned to tennis shoes.        Chief Complaint   Patient presents with    Foot Pain     F/u fracture big toe       Patient Active Problem List   Diagnosis    Personal history of colonic polyps    COPD (chronic obstructive pulmonary disease)    Seizures    GERD (gastroesophageal reflux disease)    Primary hypertension    Fatty liver    History of alcohol abuse    History of colon polyps    Elevated ferritin    Carrier of hemochromatosis HFE gene mutation    Aortic atherosclerosis    Liver lesion       Current Outpatient Medications on File Prior to Visit   Medication Sig Dispense Refill    albuterol (PROVENTIL/VENTOLIN HFA) 90 mcg/actuation inhaler INHALE 2 PUFFS INTO THE LUNGS EVERY 6 HOURS AS NEEDED FOR WHEEZING OR SHORTNESS OF BREATH 8.5 g 6    benzonatate (TESSALON) 100 MG capsule Take 2 capsules (200 mg total) by mouth 3 (three) times daily as needed for Cough. 30 capsule 0    fluticasone furoate-vilanteroL (BREO ELLIPTA) 200-25 mcg/dose DsDv diskus inhaler Inhale 1 puff into the lungs once daily. Controller 1 each 5    INCRUSE ELLIPTA 62.5 mcg/actuation inhalation capsule INHALE 1 PUFF INTO THE LUNGS EVERY DAY 30 each 11    ipratropium (ATROVENT) 42 mcg (0.06 %) nasal spray 2 sprays by Nasal route 3 (three) times daily. 30 mL 0    lisinopriL (PRINIVIL,ZESTRIL) 20 MG tablet TAKE 1 TABLET BY MOUTH EVERY DAY NEED APPOINTMENT FOR REFILL 90 tablet 0    omeprazole 20 mg TbEC Take by mouth. 1 Tablet, Delayed Release (E.C.) Oral Every day      predniSONE (DELTASONE) 20 MG tablet Take 1 tablet (20 mg total) by mouth 2 (two) times daily. 10 tablet 0    SYMBICORT 160-4.5 mcg/actuation HFAA INHALE 2 PUFFS INTO THE LUNGS EVERY 12 HOURS 10.2 g 12    tamsulosin (FLOMAX) 0.4 mg Cap TAKE 1 CAPSULE(0.4 MG) BY MOUTH EVERY DAY 30 capsule 12    tamsulosin (FLOMAX) 0.4 mg Cap Take 1 capsule (0.4 mg total) by mouth once daily. 30 capsule 12    tiotropium (SPIRIVA WITH HANDIHALER) 18 mcg inhalation capsule INHALE THE CONTENTS OF 1  CAPSULE(18 MCG) INTO THE LUNGS EVERY DAY Appointment needed 30 capsule 1    tiotropium (SPIRIVA) 18 mcg inhalation capsule Inhale 18 mcg into the lungs once daily. Controller      tiotropium (SPIRIVA) 18 mcg inhalation capsule Inhale 1 capsule (18mcg) into the lungs once daily. 30 capsule 0     No current facility-administered medications on file prior to visit.       Review of patient's allergies indicates:  No Known Allergies    Past Surgical History:   Procedure Laterality Date    COLONOSCOPY N/A 9/21/2018    Procedure: COLONOSCOPY;  Surgeon: Mniesh Garzon MD;  Location: [address]);  Service: Endoscopy;  Laterality: N/A;  3 year f/u-past due- history of colon polyps    COLONOSCOPY W/ POLYPECTOMY      ESOPHAGOGASTRODUODENOSCOPY      TONSILLECTOMY         Family History   Problem Relation Name Age of Onset    Heart disease Mother      Cancer Mother          colon     COPD Father      Emphysema Father      Seizures Son      Seizures Brother      Hypertension Brother         Social History     Socioeconomic History    Marital status: Single   Occupational History     Employer: [employer]   Tobacco Use    Smoking status: Former     Current packs/day: 0.00     Average packs/day: 1.5 packs/day for 35.0 years (52.5 ttl pk-yrs)     Types: Cigarettes     Start date: 2/4/1974     Quit date: 2/4/2009     Years since quitting: 15.2    Smokeless tobacco: Never   Substance and Sexual Activity    Alcohol use: Yes     Comment: revious history of heavy/daily alcohol use on and off for many years, currently drinking 2-3 servings most nights of the week with some periods of abstinence    Drug use: No     Social Determinants of Health     Financial Resource Strain: Low Risk  (8/23/2023)    Overall Financial Resource Strain (CARDIA)     Difficulty of Paying Living Expenses: Not hard at all   Food Insecurity: No Food Insecurity (8/23/2023)    Hunger Vital Sign     Worried About Running Out of Food in  the Last Year: Never true     Ran Out of Food in the Last Year: Never true   Transportation Needs: No Transportation Needs (8/23/2023)    PRAPARE - Transportation     Lack of Transportation (Medical): No     Lack of Transportation (Non-Medical): No   Physical Activity: Insufficiently Active (8/23/2023)    Exercise Vital Sign     Days of Exercise per Week: 2 days     Minutes of Exercise per Session: 20 min   Housing Stability: Low Risk  (8/23/2023)    Housing Stability Vital Sign     Unable to Pay for Housing in the Last Year: No     Number of Places Lived in the Last Year: 1     Unstable Housing in the Last Year: No       Review of Systems   Constitutional: Negative for chills and fever.   Cardiovascular:  Negative for claudication and leg swelling.   Respiratory:  Positive for cough and shortness of breath.         COPD   Endocrine: Positive for cold intolerance.   Skin:  Positive for dry skin. Negative for itching and rash.   Musculoskeletal:  Positive for falls, joint pain, myalgias and stiffness. Negative for joint swelling and muscle weakness.   Gastrointestinal:  Negative for diarrhea, nausea and vomiting.   Neurological:  Negative for numbness, paresthesias, tremors and weakness.   Psychiatric/Behavioral:  Negative for altered mental status and hallucinations.            Objective:      Vitals:    05/13/24 1011   BP: (!) 192/100   Pulse: 90   Weight: 84.5 kg (186 lb 4.6 oz)   PainSc: 0-No pain           Physical Exam  Vitals and nursing note reviewed.   Constitutional:       General: He is not in acute distress.     Appearance: He is well-developed. He is not toxic-appearing or diaphoretic.      Comments: alert and oriented x 3.    Cardiovascular:      Pulses:           Dorsalis pedis pulses are 2+ on the right side and 2+ on the left side.        Posterior tibial pulses are 1+ on the right side and 1+ on the left side.      Comments:  Capillary refill time is within normal limits. Digital hair present.    Pulmonary:      Effort: No respiratory distress.   Musculoskeletal:         General: No deformity.      Right ankle: No tenderness. No lateral malleolus, medial malleolus, AITF ligament, CF ligament or posterior TF ligament tenderness.      Right Achilles Tendon: No defects. Dennis's test negative.      Left ankle: No tenderness. No lateral malleolus, medial malleolus, AITF ligament, CF ligament or posterior TF ligament tenderness.      Left Achilles Tendon: No defects. Dennis's test negative.      Right foot: No tenderness or bony tenderness.      Left foot: Swelling, tenderness (hallux) and bony tenderness (phalanx of hallux) present.      Comments: Muscle strength is 5/5 in all groups bilaterally.           Feet:      Right foot:      Skin integrity: Skin integrity normal.      Left foot:      Skin integrity: Skin integrity normal.   Lymphadenopathy:      Comments: No lymphatic streaking     Skin:     General: Skin is warm and dry.      Coloration: Skin is not pale.      Findings: Ecchymosis (resolved to left hallux) present. No rash.      Nails: There is no clubbing.   Neurological:      Sensory: No sensory deficit.      Motor: No atrophy.      Comments: Light touch present     Psychiatric:         Attention and Perception: He is attentive.         Mood and Affect: Mood is not anxious. Affect is not inappropriate.         Speech: He is communicative. Speech is not slurred.         Behavior: Behavior is not combative.       05/13/2024         04/15/2024         04/01/24                  Assessment:       Encounter Diagnoses   Name Primary?    Open nondisplaced fracture of proximal phalanx of left great toe with routine healing, subsequent encounter Yes    Subungual hematoma of great toe of left foot, subsequent encounter              Plan:     Problem List Items Addressed This Visit    None  Visit Diagnoses       Open nondisplaced fracture of proximal phalanx of left great toe with routine healing,  subsequent encounter    -  Primary    Subungual hematoma of great toe of left foot, subsequent encounter                   I counseled the patient on his conditions, their implications and medical management.      Personally and independently visualized and interpreted imaging with patient. Discussed my interpretation in detail, answering patient inquiries. I see the proximal phalanx fracture noted by radiologist, but also the distal hallux has changes consistent with fracture. Stagnant, no displacement or significant change from previous encounter.    Hallux nail bed wound has healed well with no signs of continued skin breakdown noted. Skin is still delicate therefore patient must be diligent in avoiding excessive pressure and making sure there is adequate support and padding in shoe gear.     Informed patient that the nail may separate and fall off in the next few weeks. In almost all cases, the nail will grow back from under the cuticle. This takes a few weeks to start and is complete in about 6-12 months for a toenail. If the nail bed was damaged, the nail may grow back with a rough or irregular shape. Sometimes the nail may not regrow at all.    Education about the prevention of limb loss with open fracture.    Discussed wound healing cycle, skin integrity, ways to care for skin. Counseled patient on the effects of biomechanical pressure healing. He verbalizes understanding that it can increase the chances of delayed healing and this prolonged exposure leads to infection or progression of infection which subsequently can result in loss of limb.    Adequate vitamin supplementation, protein intake, and hydration - discussed with patient    Continue home care as previously discussed     Follow-up: PRN but should call [facility] immediately if any signs of infection, such as fever, chills, sweats, increased redness or pain.    Short-term goals include maintaining good offloading and minimizing bioburden, promoting  granulation and epithelialization to healing.  Long-term goals include keeping the wound healed by good offloading and medical management under the direction of internist.    Procedures

## 2024-05-13 ENCOUNTER — OFFICE VISIT (OUTPATIENT)
Dept: PODIATRY | Facility: CLINIC | Age: 69
End: 2024-05-13
Payer: MEDICARE

## 2024-05-13 ENCOUNTER — HOSPITAL ENCOUNTER (OUTPATIENT)
Dept: RADIOLOGY | Facility: HOSPITAL | Age: 69
Discharge: HOME | End: 2024-05-13
Attending: PODIATRIST
Payer: MEDICARE

## 2024-05-13 VITALS
SYSTOLIC BLOOD PRESSURE: 192 MMHG | WEIGHT: 186.31 LBS | HEART RATE: 90 BPM | DIASTOLIC BLOOD PRESSURE: 100 MMHG | BODY MASS INDEX: 27.51 KG/M2

## 2024-05-13 DIAGNOSIS — S92.415D: ICD-10-CM

## 2024-05-13 DIAGNOSIS — S90.212D SUBUNGUAL HEMATOMA OF GREAT TOE OF LEFT FOOT, SUBSEQUENT ENCOUNTER: ICD-10-CM

## 2024-05-13 DIAGNOSIS — S92.415D: Primary | ICD-10-CM

## 2024-05-13 PROCEDURE — 99214 OFFICE O/P EST MOD 30 MIN: CPT | Mod: PBBFAC,25 | Performed by: PODIATRIST

## 2024-05-13 PROCEDURE — 99214 OFFICE O/P EST MOD 30 MIN: CPT | Mod: S$PBB,,, | Performed by: PODIATRIST

## 2024-05-13 PROCEDURE — 73630 X-RAY EXAM OF FOOT: CPT | Mod: 26,LT,, | Performed by: INTERNAL MEDICINE

## 2024-05-13 PROCEDURE — 73630 X-RAY EXAM OF FOOT: CPT | Mod: TC,LT

## 2024-05-13 PROCEDURE — 99999 PR PBB SHADOW E&M-EST. PATIENT-LVL IV: CPT | Mod: PBBFAC,,, | Performed by: PODIATRIST

## 2024-05-13 NOTE — PATIENT INSTRUCTIONS
Over the counter pain creams: Voltaren Gel, Biofreeze, Bengay, tiger balm, two old goat, lidocaine gel,  Absorbine Veterinary Liniment Gel Topical Analgesic Sore Muscle and Joint Pain Relief    Recommend lotions: eucerin, eucerin for diabetics, aquaphor, A&D ointment, gold bond for diabetics, sween, Burns's Bees all purpose baby ointment,  urea 40 with aloe or SkinIntegra rapid crack repair (found on amazon.com)    Shoe recommendations: (try 6pm.com, zappos.com , nordstromrack.PageLever, or shoes.PageLever for discounted prices) you can visit varsity shoes in [place], DSW shoes in [place]  or megan rack in the [facility] (there are also several shoe brand outlets in the [facility])    ONLY purchase stability style tennis shoes NO flex, foam, free, yoga mat style shoes    Shoe examples:    Asics (GT 4000 or gel foundations), new balance stability type shoes (such as the 940 series), saucony (stabil c3),  Nash (GTS or Beast or   transcend), propet, HokaOne (tennis shoe) Jarocho (tennis shoes and boots)    Sofft Juni (women) Mimi&Wilian (men), clarks, croallen, aerosoles, naturalizers, SAS, ecco, born, dolores mendez, rockports (dress shoes)    Vionic, burkenstocks, fitflops, propet, taos, baretraps (sandals)    HokaOne sandals, crocs, propet (house shoes)      Nail Home remedy:  Vicks Vapor rub or Emuaid to nails for easier manageability    Occasional soaks for 15-20 mins in luke warm water with 1/2 cup of listerine and 1 cup of apple cider vinegar are ok You may add several drops of oil of oregano or tea tree oil as well      What Is Arthritis in the Foot?  Degenerative arthritis is a condition that slowly wears away joints, the area where bones meet and move. In the beginning, you may notice that the affected joint seems stiff. It may even ache. As the joint lining (cartilage) breaks down, the bones rub against each other, causing pain and swelling. Over time, small pieces of rough or splintered bone (bone spurs) develop,  and the joints range of motion becomes limited. But movement doesnt have to cause pain. The effects of arthritis can be reduced.    The big-toe joint  When arthritis affects your big toe, your foot hurts when it pushes off the ground. Arthritis often appears in the big-toe joint along with a bunion (a bony bump at the side of the joint) or a bone spur on top of the joint.    Other joints  When arthritis affects the rear or midfoot joints, you feel pain when you put weight on your foot. Arthritis may affect the joint where the ankle and foot meet. It may also affect other joints nearby.  Date Last Reviewed: 7/1/2016 © 2000-2017 [employer]. [address] [place] [address], [place] [address]. All rights reserved. This information is not intended as a substitute for professional medical care. Always follow your healthcare professional's instructions.        Treating Arthritis in the Foot  If your symptoms are mild, medications may be enough to reduce pain and swelling. For more severe arthritis, surgery may be needed to improve the condition of the joint.    Medicine  Your doctor may prescribe medicine--pills or injections--to limit pain and swelling. Ice, aspirin, acetaminophen, or ibuprofen may help relieve mild symptoms that occur after activity.  Surgery and bone trimming  To ease movement and reduce pain, your doctor may trim damaged bone. If arthritis is severe, the joint may be fused or removed. If the bone is not damaged too badly, your doctor may simply shave away bone spurs. Any excess bone growth related to a bunion may also be trimmed.  Fusing joints  If damage is more severe, your doctor may fuse the joint to prevent the bones from rubbing. Afterward, staples, plates, or screws may hold the bones in place so they heal properly. In some cases, the joint may be removed and replaced with an implant.  After surgery  During the early stages of recovery, your foot is likely to be bandaged and immobilized  for a while. For best results, follow up with your doctor as scheduled. These visits help ensure that your foot heals properly.  As you heal  After surgery, youll be told how to care for your incision and how soon to begin walking on the foot. Until the foot can bear weight, you may need to walk with crutches or a cane.  For surgery on the big toe, your foot may be splinted to limit movement for several weeks. Despite this, you should be able to walk soon after surgery.  For surgery on rear or midfoot joints, you may need to wear a cast or surgical shoe. These joints are fairly large, so full recovery may take a few months. Once the bone has healed, any staples, plates, or screws may be removed.  Date Last Reviewed: 7/1/2016 © 2000-2017 [employer]. [address] [place] [address]. All rights reserved. This information is not intended as a substitute for professional medical care. Always follow your healthcare professional's instructions.        Foot Surgery: Degenerative Joint Disease    Degenerative joint disease (arthritis) often happens in the joint of a big toe. This bone growth may cause pain and stiffness in the joint. Left untreated, arthritis can break down the cartilage and destroy the joint. Your treatment choices depend on how damaged your joint is. There are many nonsurgical treatments, but if these are not helpful, surgery may be considered.    Cheilectomy  This is done when the arthritic joint and cartilage can be saved. A bone spur caused by arthritis may be symptomatic on the top of the big toe joint. The procedure involves removing this bone spur, usually with a small part of the top of the joint itself.  You will need to wear a surgical shoe for several weeks. Once the foot heals, joint movement is restored.    Fusion  In fusion, the cartilage and some bone on both sides of the joint are removed. Then, the big toe and metatarsal bones are held together with staples,  screws, or a plate and screws. Your foot may be placed in a cast. While you heal, you will be asked not to bear weight on this foot. You may also need crutches for several weeks. Because the joint has been removed, your toe will be less flexible.    Arthroplasty  During surgery, bone growth caused by the arthritis is trimmed, and part of the joint is removed. A pin can be used to align the bones and to keep them from touching. The pin is removed after several weeks. In some cases, the entire joint may be replaced with an implant. You may have to wear a splint or a surgical shoe for several weeks. When healed, the bones become connected with scar tissue.  Date Last Reviewed: 10/15/2015  © 2792-5768 [employer]. [address] [place] [address]. All rights reserved. This information is not intended as a substitute for professional medical care. Always follow your healthcare professional's instructions.          Toe Extension (Flexibility)    These instructions are for your right foot. Switch sides for your left foot.  Sit in a chair. Rest your right ankle on your left knee.  Hold your toes with your right hand. Gently bend the toes backward. Feel a stretch in the undersides of the toes and ball of the foot. Hold for 30 to 60 seconds.  Then gently bend the toes in the other direction. Gently press on them until your foot is pointed. Hold for 30 to 60 seconds.  Repeat 5 times, or as instructed.  © 1944-5582 [employer]. [address] [place] [address]. All rights reserved. This information is not intended as a substitute for professional medical care. Always follow your healthcare professional's instructions.      Ankle Alphabet (Flexibility)    These instructions are for your right foot. Switch sides for your left foot.  Sit on the floor with your legs straight in front of you.  Rest your right calf on a rolled-up towel. Use your foot to write the letters of the alphabet  in mid-air.  Repeat this exercise 3 times a day, or as instructed.  Date Last Reviewed: 3/10/2016  © 5050-2040 [employer]. [address] [place] [address]. All rights reserved. This information is not intended as a substitute for professional medical care. Always follow your healthcare professional's instructions.        Calf Raise (Strength)    Stand up straight with both feet flat on the floor, slightly apart. Hold onto a sturdy chair, railing, counter, or table.  Raise both heels so youre standing on the balls of your feet. Dont lock your knees or arch your back. Hold for 5 seconds. Then slowly lower your heels back down to the floor.  Repeat 10 times, or as instructed.  Do this exercise 3 times a day, or as instructed.     Challenge yourself  As you become stronger, do this exercise on one foot at a time.   Date Last Reviewed: 3/10/2016  © 8356-2750 [employer]. [address] [place] [address]. All rights reserved. This information is not intended as a substitute for professional medical care. Always follow your healthcare professional's instructions.        Bent-Knee Calf Stretch  This exercise is designed to stretch and strengthen your feet and ankles. Before beginning the exercise, read through all the instructions. While exercising, breathe normally and dont bounce. If you feel any pain, stop the exercise. If pain persists, inform your healthcare provider:    Stand an arms length away from a wall. Place the palms of your hands on the wall. Step forward about 12 inches with your ______ foot.  Keeping toes pointed forward and both heels on the floor, bend both knees and lean forward. Hold for ______ seconds. Relax.  Repeat ______ times. Do ______ sets a day.  Date Last Reviewed: 8/16/2015  © 8816-3937 [employer]. [address] [place] [address]. All rights reserved. This information is not intended as a substitute for professional  medical care. Always follow your healthcare professional's instructions.        Arch retraining    These exercises are for your right foot. Switch sides for your left foot.  Sit in a chair or stand with both feet flat on the floor. Press down with the ball of your right foot, but only on the left side of the foot, just under the big toe.  Then pull the bottom of your big toe back toward your heel. This should pull up the arch of your foot. Dont flex your toes while doing this. It is a subtle movement of the arch.  Hold for 5 seconds. Relax.  Date Last Reviewed: 3/10/2016  © 3396-0963 [employer]. [address] [place] [address]. All rights reserved. This information is not intended as a substitute for professional medical care. Always follow your healthcare professional's instructions.        Ankle Dorsiflexion/Plantarflexion (Flexibility)    Sit on the floor or in bed with your legs straight in front of you.  Point both feet. Then flex both feet.  Do this 10 to 30 times in a row.  Repeat this exercise 2 times a day, or as instructed.  Date Last Reviewed: 5/1/2016 © 2000-2016 [employer]. [address] [place] [address]. All rights reserved. This information is not intended as a substitute for professional medical care. Always follow your healthcare professional's instructions.

## 2024-05-23 ENCOUNTER — TELEPHONE (OUTPATIENT)
Dept: PULMONOLOGY | Facility: CLINIC | Age: 69
End: 2024-05-23
Payer: MEDICARE

## 2024-05-23 NOTE — TELEPHONE ENCOUNTER
Call was returned to patient in regards to a refill on albuterol (PROVENTIL/VENTOLIN HFA) 90 mcg/actuation inhaler. Patient states the inhaler works well. I informed patient that I will send Dr Perdomo a message to review and advise. Patient verbalized understanding.

## 2024-05-23 NOTE — TELEPHONE ENCOUNTER
----- Message from Hilda Bello sent at 5/23/2024  2:31 PM CDT -----  Regarding: Refill  Contact: [phone]  Rx Refill/Request    Is this a Refill or New Rx:  refill    Rx Name and Strength:  albuterol (PROVENTIL/VENTOLIN HFA) 90 mcg/actuation inhaler 8.5 g    Preferred Pharmacy with phone number:  [id number]  [place] - [address] AT [facility] [address]      Additional Information:Please call [phone]

## 2024-05-24 ENCOUNTER — PATIENT MESSAGE (OUTPATIENT)
Dept: PULMONOLOGY | Facility: CLINIC | Age: 69
End: 2024-05-24
Payer: MEDICARE

## 2024-05-24 DIAGNOSIS — J43.2 CENTRILOBULAR EMPHYSEMA: ICD-10-CM

## 2024-05-24 RX ORDER — ALBUTEROL SULFATE 90 UG/1
2 AEROSOL, METERED RESPIRATORY (INHALATION) EVERY 6 HOURS PRN
Qty: 8.5 G | Refills: 6 | Status: SHIPPED | OUTPATIENT
Start: 2024-05-24

## 2024-05-30 RX ORDER — TIOTROPIUM BROMIDE 18 UG/1
18 CAPSULE ORAL; RESPIRATORY (INHALATION) DAILY
OUTPATIENT
Start: 2024-05-30

## 2024-06-12 NOTE — PROGRESS NOTES
Subjective:      Patient ID: Nilo Hoover is a 68 y.o. male.    Chief Complaint: COPD and Shortness of Breath    Mr. Hoover is a 65 yo with HTN, fatty liver disease/cirrhosis, and GERD that presents for follow up COPD. Previous seen by Dr. Escalona. Wheezing improved. Has good days and bad days. Has tougher time with the heat. Has discontinued use of Spiriva and switched to Breo and incruse ellipta.      Smoking hx: quit 2009 years ago, 30+ py smoking hx  Work hx: retired   Exposure hx: None  Inhaler use: Breo, Incruse (nightly)  PRN inhaler use: albuterol- 10-12 x/week  Hx of lung dz: COPD  Family hx of lung dz: dad- bronchitis/emphysema    Review of Systems   Constitutional:  Negative for weight loss, activity change, fatigue and weakness.   HENT:  Negative for postnasal drip and congestion.    Respiratory:  Positive for cough (intermitent), dyspnea on extertion and use of rescue inhaler. Negative for hemoptysis, sputum production, chest tightness, shortness of breath and wheezing.    Cardiovascular:  Negative for chest pain, palpitations and leg swelling.   Gastrointestinal:  Negative for nausea, vomiting and acid reflux.   Neurological:  Negative for syncope and light-headedness.   Psychiatric/Behavioral:  Negative for confusion and sleep disturbance. The patient is not nervous/anxious.      Objective:     Physical Exam   Constitutional: He is oriented to person, place, and time. He appears well-developed and well-nourished. No distress. He is not obese.   HENT:   Head: Normocephalic.   Cardiovascular: Normal rate, regular rhythm and normal heart sounds. Exam reveals no gallop and no friction rub.   No murmur heard.  Pulmonary/Chest: Hyperinflation, symmetric chest wall expansion and effort normal. No respiratory distress. He has decreased breath sounds. He has no wheezes. He has no rhonchi. He has no rales.   Musculoskeletal:         General: No edema.   Neurological: He is alert and oriented to  "person, place, and time. Gait normal.   Skin: He is not diaphoretic. No cyanosis. Nails show no clubbing.   Psychiatric: He has a normal mood and affect. His behavior is normal. Judgment and thought content normal.   Vitals reviewed.    Personal Diagnostic Review    CT of chest performed on 7/6/23 without contrast revealed RUL pulmonary nodule, decreased in size since 2013 CT chest. New KONRAD 0.6 cm pulmonary nodule. Severe bilateral panlobular emphysema.      Echocardiogram: 7/6/22  The left ventricle is normal in size with normal systolic function.  The estimated ejection fraction is 65%.  Normal left ventricular diastolic function.  Normal right ventricular size with normal right ventricular systolic function.  Normal central venous pressure (3 mmHg).  The estimated PA systolic pressure is 23 mmHg.     Pulmonary function tests:   FEV1: 1.14L  (40.6 % predicted),   FVC:  2.75L (74.7 % predicted),   FEV1/FVC:  41,   TLC: unable to perform  DLCO: 8.40 (35.5 % predicted)        5/13/2024    10:11 AM 4/15/2024    10:47 AM 4/1/2024     9:36 AM 3/20/2024    11:18 PM 3/20/2024     9:32 PM 3/20/2024     7:30 PM 3/20/2024     5:08 PM   Pulmonary Function Tests   SpO2    99 % 100 % 99 % 96 %   Height   5' 9" (1.753 m)    5' 9" (1.753 m)   Weight 84.5 kg (186 lb 4.6 oz) 82.8 kg (182 lb 8.7 oz) 78.9 kg (174 lb)    77.1 kg (170 lb)   BMI (Calculated)   25.7    25.1     Assessment:     1. Panlobular emphysema    2. Hx of tobacco use, presenting hazards to health      Outpatient Encounter Medications as of 6/13/2024   Medication Sig Dispense Refill    albuterol (PROVENTIL/VENTOLIN HFA) 90 mcg/actuation inhaler Inhale 2 puffs into the lungs every 6 (six) hours as needed for Wheezing. Rescue 8.5 g 6    benzonatate (TESSALON) 100 MG capsule Take 2 capsules (200 mg total) by mouth 3 (three) times daily as needed for Cough. 30 capsule 0    fluticasone furoate-vilanteroL (BREO ELLIPTA) 200-25 mcg/dose DsDv diskus inhaler Inhale 1 puff " into the lungs once daily. Controller 1 each 5    INCRUSE ELLIPTA 62.5 mcg/actuation inhalation capsule INHALE 1 PUFF INTO THE LUNGS EVERY DAY 30 each 11    ipratropium (ATROVENT) 42 mcg (0.06 %) nasal spray 2 sprays by Nasal route 3 (three) times daily. 30 mL 0    lisinopriL (PRINIVIL,ZESTRIL) 20 MG tablet TAKE 1 TABLET BY MOUTH EVERY DAY NEED APPOINTMENT FOR REFILL 90 tablet 0    omeprazole 20 mg TbEC Take by mouth. 1 Tablet, Delayed Release (E.C.) Oral Every day      predniSONE (DELTASONE) 20 MG tablet Take 1 tablet (20 mg total) by mouth 2 (two) times daily. 10 tablet 0    SYMBICORT 160-4.5 mcg/actuation HFAA INHALE 2 PUFFS INTO THE LUNGS EVERY 12 HOURS 10.2 g 12    tamsulosin (FLOMAX) 0.4 mg Cap TAKE 1 CAPSULE(0.4 MG) BY MOUTH EVERY DAY 30 capsule 12    tamsulosin (FLOMAX) 0.4 mg Cap Take 1 capsule (0.4 mg total) by mouth once daily. 30 capsule 12    tiotropium (SPIRIVA WITH HANDIHALER) 18 mcg inhalation capsule INHALE THE CONTENTS OF 1 CAPSULE(18 MCG) INTO THE LUNGS EVERY DAY Appointment needed 30 capsule 1    tiotropium (SPIRIVA) 18 mcg inhalation capsule Inhale 18 mcg into the lungs once daily. Controller      tiotropium (SPIRIVA) 18 mcg inhalation capsule Inhale 1 capsule (18mcg) into the lungs once daily. 30 capsule 0    [DISCONTINUED] albuterol (PROVENTIL/VENTOLIN HFA) 90 mcg/actuation inhaler INHALE 2 PUFFS INTO THE LUNGS EVERY 6 HOURS AS NEEDED FOR WHEEZING OR SHORTNESS OF BREATH 8.5 g 6     No facility-administered encounter medications on file as of 6/13/2024.     Orders Placed This Encounter   Procedures    CT Chest Lung Screening Low Dose     Standing Status:   Future     Standing Expiration Date:   6/13/2025     Order Specific Question:   Is there documentation of shared decision making for this lung screening exam?     Answer:   Yes     Order Specific Question:   Is the patient a current smoker?     Answer:   No     Order Specific Question:   How many years has the patient quit smoking?      Answer:   14     Order Specific Question:   Does the patient have a 20-pack/year or greater smoke history?     Answer:   Yes     Order Specific Question:   Is the patient between the ages 50-80 years old?     Answer:   Yes     Order Specific Question:   Does the patient show any signs or symptoms of lung cancer?     Answer:   No     Order Specific Question:   Is this the first (baseline) CT or an annual exam?     Answer:   Annual [2]     Order Specific Question:   May the Radiologist modify the order per protocol to meet the clinical needs of the patient?     Answer:   Yes     Order Specific Question:   Is this a low dose screening chest CT?     Answer:   Yes     Plan:     COPD (chronic obstructive pulmonary disease)  Continue Breo and Incruse Ellipta. Well controlled without exacerbations since last being seen. Obtain Walk test next year with visit.     Hx of tobacco use, presenting hazards to health  Previous CT/PET negative for hypermetabolic nodules. Repeat CT chest in 10/2024 for final surveillance as it will be his 15 year sanjuanita of quitting smoking.     Follow up in 1 year.     Jacques Perdomo MD  [facility]

## 2024-06-13 ENCOUNTER — OFFICE VISIT (OUTPATIENT)
Dept: PULMONOLOGY | Facility: CLINIC | Age: 69
End: 2024-06-13
Payer: MEDICARE

## 2024-06-13 VITALS
HEART RATE: 87 BPM | WEIGHT: 181.19 LBS | OXYGEN SATURATION: 96 % | BODY MASS INDEX: 28.44 KG/M2 | SYSTOLIC BLOOD PRESSURE: 136 MMHG | DIASTOLIC BLOOD PRESSURE: 76 MMHG | HEIGHT: 67 IN

## 2024-06-13 DIAGNOSIS — Z87.891 HX OF TOBACCO USE, PRESENTING HAZARDS TO HEALTH: ICD-10-CM

## 2024-06-13 DIAGNOSIS — J43.1 PANLOBULAR EMPHYSEMA: Primary | ICD-10-CM

## 2024-06-13 PROCEDURE — 99213 OFFICE O/P EST LOW 20 MIN: CPT | Mod: PBBFAC | Performed by: INTERNAL MEDICINE

## 2024-06-13 PROCEDURE — 99999 PR PBB SHADOW E&M-EST. PATIENT-LVL III: CPT | Mod: PBBFAC,,, | Performed by: INTERNAL MEDICINE

## 2024-06-13 PROCEDURE — 99213 OFFICE O/P EST LOW 20 MIN: CPT | Mod: S$PBB,,, | Performed by: INTERNAL MEDICINE

## 2024-06-13 NOTE — ASSESSMENT & PLAN NOTE
Continue Breo and Incruse Ellipta. Well controlled without exacerbations since last being seen. Obtain Walk test next year with visit.

## 2024-06-13 NOTE — ASSESSMENT & PLAN NOTE
Previous CT/PET negative for hypermetabolic nodules. Repeat CT chest in 10/2024 for final surveillance as it will be his 15 year sanjuanita of quitting smoking.

## 2024-06-18 DIAGNOSIS — J43.2 CENTRILOBULAR EMPHYSEMA: ICD-10-CM

## 2024-06-18 RX ORDER — FLUTICASONE FUROATE AND VILANTEROL TRIFENATATE 200; 25 UG/1; UG/1
POWDER RESPIRATORY (INHALATION)
Qty: 60 EACH | Refills: 11 | Status: SHIPPED | OUTPATIENT
Start: 2024-06-18

## 2024-06-27 DIAGNOSIS — I10 ESSENTIAL HYPERTENSION: ICD-10-CM

## 2024-06-27 RX ORDER — LISINOPRIL 20 MG/1
TABLET ORAL
Qty: 90 TABLET | Refills: 0 | Status: SHIPPED | OUTPATIENT
Start: 2024-06-27

## 2024-06-27 NOTE — TELEPHONE ENCOUNTER
Care Due:                  Date            Visit Type   Department     Provider  --------------------------------------------------------------------------------    Last Visit: None Found      None         None Found  Next Visit: None Scheduled  None         None Found                                                            Last  Test          Frequency    Reason                     Performed    Due Date  --------------------------------------------------------------------------------    Office Visit  15 months..  lisinopriL...............  Not Found    Overdue    [employer] Embedded Care Due Messages. Reference number: [id number].   6/27/2024 5:29:44 AM CDT

## 2024-07-10 ENCOUNTER — OFFICE VISIT (OUTPATIENT)
Dept: HEPATOLOGY | Facility: CLINIC | Age: 69
End: 2024-07-10
Payer: MEDICARE

## 2024-07-10 ENCOUNTER — PROCEDURE VISIT (OUTPATIENT)
Dept: HEPATOLOGY | Facility: CLINIC | Age: 69
End: 2024-07-10
Payer: MEDICARE

## 2024-07-10 VITALS — BODY MASS INDEX: 28.52 KG/M2 | HEIGHT: 67 IN | WEIGHT: 181.69 LBS

## 2024-07-10 DIAGNOSIS — F10.20 ALCOHOL DEPENDENCE, UNCOMPLICATED: ICD-10-CM

## 2024-07-10 DIAGNOSIS — K76.0 FATTY LIVER: ICD-10-CM

## 2024-07-10 DIAGNOSIS — K76.0 FATTY LIVER: Primary | ICD-10-CM

## 2024-07-10 DIAGNOSIS — I10 PRIMARY HYPERTENSION: ICD-10-CM

## 2024-07-10 DIAGNOSIS — R79.89 ELEVATED FERRITIN: ICD-10-CM

## 2024-07-10 DIAGNOSIS — D18.03 LIVER HEMANGIOMA: ICD-10-CM

## 2024-07-10 DIAGNOSIS — Z14.8 CARRIER OF HEMOCHROMATOSIS HFE GENE MUTATION: ICD-10-CM

## 2024-07-10 PROCEDURE — 91200 LIVER ELASTOGRAPHY: CPT | Mod: 26,S$PBB,, | Performed by: NURSE PRACTITIONER

## 2024-07-10 PROCEDURE — 99999 PR PBB SHADOW E&M-EST. PATIENT-LVL III: CPT | Mod: PBBFAC,,, | Performed by: NURSE PRACTITIONER

## 2024-07-10 PROCEDURE — 99213 OFFICE O/P EST LOW 20 MIN: CPT | Mod: PBBFAC,25 | Performed by: NURSE PRACTITIONER

## 2024-07-10 PROCEDURE — 91200 LIVER ELASTOGRAPHY: CPT | Mod: PBBFAC | Performed by: NURSE PRACTITIONER

## 2024-07-10 PROCEDURE — 99214 OFFICE O/P EST MOD 30 MIN: CPT | Mod: S$PBB,,, | Performed by: NURSE PRACTITIONER

## 2024-07-10 NOTE — PATIENT INSTRUCTIONS
1. Fibroscan to look for fat or scar tissue in the liver showed >70% fat in the liver, no scar tissue   2.  Follow up in 1 year with labs 1 week before, fibroscan same day     These things are important to improve fatty liver:  Limit alcohol consumption, no more than 1-2 serving(s) of alcohol in any day (1 serving is 5 ounces of wine, 12 ounces of beer, or 1.5 ounces of liquor) and do NOT recommend any daily alcohol use    2 Weight loss goal of 10 lbs. [facility] offers benefits to patients so let me know if you want a referral to the [facility] gym. Also, [facility] has dieticians that can create a weight loss plan. If you are interested let me know and I can place a referral. If so, contact the dietician team at [facility] at email [email or web address] or call [phone] to get scheduled. They do offer video visits   3. Avoid processed foods. No fried/fast foods. No sugary drinks or drinks with any calories.   4. Low carb/sugar and high protein diet (~1 g/kg/day of protein).Try to limit your carb intake to LESS than 30-45 grams of carbs with a meal or LESS than 5-10 grams with any snack (total of any snack foods eaten during that time). Use ZeePearl Pal or Lose It ignacio to add up your carbs through the day. Do NOT drink any beverages with calories or carbs (this can lead to high blood sugar and weight gain). The main thing to focus on is high protein, low carb)  5. Exercise, 5 days per week, 30 minutes per day, as tolerated  6. Recommend good cholesterol, blood pressure, blood sugar levels   7. There is a new medication called Rezdiffra for the treatment of F2-F3 liver scarring due to fatty liver. It is only indicated for patients with significant scar tissue from fatty liver (not you currently)    In some people, fatty liver can progress to steatohepatitis (inflamatory fatty liver) and possibly to cirrhosis, increasing the risk for liver cancer, liver failure, and  death. Therefore, the lifestyle changes are very important to decrease this risk.

## 2024-07-10 NOTE — PROGRESS NOTES
[facility] VISIT FOLLOW UP NOTE    PCP: Yasmin Prather MD     CHIEF COMPLAINT: fatty liver, HH DNA carrier    HPI: This is a 68 y.o. 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    Prior serologic workup:   Lab Results   Component Value Date    SMOOTHMUSCAB Negative 1:40 08/16/2018    AMAIFA Negative 1:40 08/16/2018    ANASCREEN Negative <1:160 08/16/2018    FERRITIN 349 (H) 10/20/2022    FESATURATED 30 10/20/2022    PETH Negative 07/30/2021    SYRIB9MZAISB MS 08/16/2018    OZBZH6ROIBTE 117 08/16/2018    CERULOPLSM 29.0 08/16/2018    HEPBSAG Negative 08/16/2018    HEPCAB Negative 04/29/2016     Risk factors for fatty liver include heavy alcohol use     Liver fibrosis staging:  -- fibroscan in 2018 was F0, S2 (kPA 6.3, )  -- fibroscan 8/2021 noted F2, S2 (kPA 7.9, )  --fibroascan 4/2022 noted F2, S3 (kPA 9.5, )  -- fibroscan 4/2023 noted F0, S3 (kPA 4.6, )  -- fibroscan 7/2024 noted F0, S3 (kPA 3.5, )    Interval HPI: Presents today alone. Liver lesion noted on US, MRI noted hemangioma, CEA and Ca 19-9 WNL  No SSB, no fried/fast foods  Previous history of heavy/daily alcohol use on and off for many years, currently drinking 2-3 servings most nights of the week with some periods of abstinence   Fibroscan today without fibrosis   Previously 170 lbs, currently 180s    Labs done 1/2024 show normal transaminase levels   Platelets wnl, alk phos WNL  Synthetic liver functioning WNL    Lab Results   Component Value Date    ALT 17 01/10/2024    AST 28 01/10/2024    ALKPHOS 58 01/10/2024    BILITOT 0.7 01/10/2024    ALBUMIN 3.9 01/10/2024    INR 0.9 01/10/2024     01/10/2024     Denies family history of liver disease . + previous heavy Alcohol consumption, see  "below  Social History     Substance and Sexual Activity   Alcohol Use Yes    Comment: revious history of heavy/daily alcohol use on and off for many years, currently drinking 2-3 servings most nights of the week with some periods of abstinence       + Immunity to Hep A and B          Allergy and medication list reviewed and updated     PMHX:  has a past medical history of Bronchitis chronic, COPD (chronic obstructive pulmonary disease), Degenerative disc disease, Erectile dysfunction, GERD (gastroesophageal reflux disease), H/O: GI bleed, HTN (hypertension) (2/26/2013), Personal history of colonic polyps (8/9), and Seizures.    PSHX:  has a past surgical history that includes Tonsillectomy; Colonoscopy w/ polypectomy; Esophagogastroduodenoscopy; and Colonoscopy (N/A, 9/21/2018).    FAMILY HISTORY: Updated and reviewed in [facility]    SOCIAL HISTORY:   Social History     Substance and Sexual Activity   Alcohol Use Yes    Comment: revious history of heavy/daily alcohol use on and off for many years, currently drinking 2-3 servings most nights of the week with some periods of abstinence       Social History     Substance and Sexual Activity   Drug Use No       ROS:   GENERAL: Denies fatigue  CARDIOVASCULAR: Denies edema  GI: Denies abdominal pain  SKIN: Denies rash, itching   NEURO: Denies confusion, memory loss, or mood changes    PHYSICAL EXAM:   Friendly White male, in no acute distress; alert and oriented to person, place and time  VITALS: Ht 5' 7" (1.702 m)   Wt 82.4 kg (181 lb 10.5 oz)   BMI 28.45 kg/m²   EYES: Sclerae anicteric  GI: Soft, non-tender, non-distended. No ascites.  EXTREMITIES:  No edema.  SKIN: Warm and dry. No jaundice. No telangectasias noted. No palmar erythema.  NEURO:  No asterixis.  PSYCH:  Thought and speech pattern appropriate. Behavior normal      EDUCATION:  See instructions discussed with patient in Instructions section of the After Visit Summary     ASSESSMENT & PLAN:  68 y.o. White male " with:  1.  Fatty liver, likely related to alcohol   - transaminases to be repeated today   - Synthetic liver function WNL  -- Previous serologic w/u in HPI  - risk factors for fatty liver disease include alcohol use    -- Fibroscan 7/2024 noted F0, S3 - repeat yearly   -- Recommendations discussed with patient:  Limit alcohol consumption, no more than 1-2 servings of alcohol in any day (1 serving is 5 ounces of wine, 12 ounces of beer, or 1.5 ounces of liquor) and do NOT recommend any daily alcohol use - may be beneficial to abstain completely given alcohol use history in the past  2  Maintain normal weight  3. Low carb/sugar, high fiber and protein diet  4. Exercise, 5 days per week, 30 minutes per day, as tolerated  5. Recommend good cholesterol, blood pressure, blood sugar levels    2. Liver hemangioma  Notes as hemangioma on MRI, tumor markers normal, no surveillance needed     3. 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 Low suspicion for clinical significant iron overload given carrier status but will continue to monitor with labs and refer to hematology if needed         Follow up in about 1 year (around 7/10/2025). with labs and fibroscan before  Orders Placed This Encounter   Procedures    FibroScan Transplant Hepatology(Vibration Controlled Transient Elastography)    Hepatic Function Panel    Phosphatidylethanol (PETH)        Thank you for allowing me to participate in the care of KASSANDRA Urrutia    I spent a total of 30 minutes on the day of the visit.This includes face to face time and non-face to face time preparing to see the patient (eg, review of tests), obtaining and/or reviewing separately obtained history, documenting clinical information in the electronic or other health record, independently interpreting results and communicating results to the patient/family/caregiver, and coordinating care.         CC'ed note  to:

## 2024-07-10 NOTE — PROCEDURES
FibroScan [place] (Vibration Controlled Transient Elastography)    Date/Time: 7/10/2024 1:00 PM    Performed by: Marcie Ignacio NP  Authorized by: Marcie Ignacio NP    Probe:  XL    Universal Protocol: Patient's identity, procedure and site were verified, confirmatory pause was performed.  Discussed procedure including risks and potential complications.  Questions answered.  Patient verbalizes understanding and wishes to proceed with VCTE.     Procedure: After providing explanations of the procedure, patient was placed in the supine position with right arm in maximum abduction to allow optimal exposure of right lateral abdomen.  Patient was briefly assessed, Testing was performed in the mid-axillary location, 50Hz Shear Wave pulses were applied and the resulting Shear Wave and Propagation Speed detected with a 3.5 MHz ultrasonic signal, using the FibroScan probe, Skin to liver capsule distance and liver parenchyma were accessed during the entire examination with the FibroScan probe, Patient was instructed to breathe normally and to abstain from sudden movements during the procedure, allowing for random measurements of liver stiffness. At least 10 Shear Waves were produced, Individual measurements of each Shear Wave were calculated.  Patient tolerated the procedure well with no complications.  Meets discharge criteria as was dismissed.  Rates pain 0 out of 10.  Patient will follow up with ordering provider to review results.    Findings  Median liver stiffness score:  3.5  CAP Reading: dB/m:  331    IQR/med %:  9  Interpretation  Fibrosis interpretation is based on medial liver stiffness - Kilopascal (kPa).    Fibrosis Stage:  F 0-1  Steatosis interpretation is based on controlled attenuation parameter - (dB/m).    Steatosis Grade:  S3

## 2024-08-27 DIAGNOSIS — Z00.00 ENCOUNTER FOR MEDICARE ANNUAL WELLNESS EXAM: ICD-10-CM

## 2024-08-30 ENCOUNTER — TELEPHONE (OUTPATIENT)
Dept: UROLOGY | Facility: CLINIC | Age: 69
End: 2024-08-30
Payer: MEDICARE

## 2024-08-30 NOTE — TELEPHONE ENCOUNTER
Pt was called. Appointment was made.   ----- Message from Viktoria Truong sent at 8/30/2024  4:09 PM CDT -----  Regarding: Rx Refill  Contact: [phone]  Nilo Hoover calling regarding Refills  (message) for #  tamsulosin (FLOMAX) 0.4 mg Cap      Pt is needing medication he only has 2 left and it helps him use the bathroom pls advise   Pt states that pharmacy states that provider doesn't want to fill it pls advise why       [id number] [place] [facility] [address] [id number]  Phone: [phone] Fax: [phone]

## 2024-09-02 ENCOUNTER — PATIENT MESSAGE (OUTPATIENT)
Dept: UROLOGY | Facility: CLINIC | Age: 69
End: 2024-09-02
Payer: MEDICARE

## 2024-09-09 NOTE — TELEPHONE ENCOUNTER
Pt was called, told a 1 month prescription for his medication would be sent to a provider to fill, and was made an appointment with Maxine Tomas.

## 2024-09-10 RX ORDER — TAMSULOSIN HYDROCHLORIDE 0.4 MG/1
0.4 CAPSULE ORAL DAILY
Qty: 90 CAPSULE | Refills: 3 | Status: SHIPPED | OUTPATIENT
Start: 2024-09-10 | End: 2025-09-10

## 2024-09-23 ENCOUNTER — OFFICE VISIT (OUTPATIENT)
Dept: UROLOGY | Facility: CLINIC | Age: 69
End: 2024-09-23
Payer: MEDICARE

## 2024-09-23 VITALS
DIASTOLIC BLOOD PRESSURE: 84 MMHG | WEIGHT: 179.25 LBS | BODY MASS INDEX: 27.17 KG/M2 | SYSTOLIC BLOOD PRESSURE: 176 MMHG | HEIGHT: 68 IN | HEART RATE: 70 BPM

## 2024-09-23 DIAGNOSIS — N40.1 BPH WITH URINARY OBSTRUCTION: Primary | ICD-10-CM

## 2024-09-23 DIAGNOSIS — N13.8 BPH WITH URINARY OBSTRUCTION: Primary | ICD-10-CM

## 2024-09-23 PROCEDURE — 99214 OFFICE O/P EST MOD 30 MIN: CPT | Mod: S$PBB,,,

## 2024-09-23 PROCEDURE — 99999 PR PBB SHADOW E&M-EST. PATIENT-LVL III: CPT | Mod: PBBFAC,,,

## 2024-09-23 PROCEDURE — 99213 OFFICE O/P EST LOW 20 MIN: CPT | Mod: PBBFAC

## 2024-09-23 RX ORDER — TAMSULOSIN HYDROCHLORIDE 0.4 MG/1
0.4 CAPSULE ORAL DAILY
Qty: 90 CAPSULE | Refills: 3 | Status: SHIPPED | OUTPATIENT
Start: 2024-09-23 | End: 2025-09-23

## 2024-09-23 NOTE — PROGRESS NOTES
CHIEF COMPLAINT:  BPH       HISTORY OF PRESENTING ILLINESS:  Nilo Hoover is a 69 y.o. male established with Dr. Interiano. Presents today for annual appointment. He is Flomax for BPH with LUTS, tolerating the medication without issue. PMHx listed below.         REVIEW OF SYSTEMS:  Review of Systems   Constitutional:  Negative for chills and fever.   HENT:  Negative for congestion and sore throat.    Eyes:  Negative for blurred vision.   Respiratory:  Negative for cough and shortness of breath.    Cardiovascular:  Negative for chest pain and palpitations.   Gastrointestinal:  Negative for nausea and vomiting.   Genitourinary:  Negative for dysuria, flank pain, frequency, hematuria and urgency.   Neurological:  Negative for dizziness and headaches.         PATIENT HISTORY:    Past Medical History:   Diagnosis Date    Bronchitis chronic     COPD (chronic obstructive pulmonary disease)     Degenerative disc disease     lumar spine     Erectile dysfunction     GERD (gastroesophageal reflux disease)     hx gastritis    H/O: GI bleed     HTN (hypertension) 2/26/2013    Personal history of colonic polyps 8/9    Seizures        Past Surgical History:   Procedure Laterality Date    COLONOSCOPY N/A 9/21/2018    Procedure: COLONOSCOPY;  Surgeon: Minesh Garzon MD;  Location: [address];  Service: Endoscopy;  Laterality: N/A;  3 year f/u-past due- history of colon polyps    COLONOSCOPY W/ POLYPECTOMY      ESOPHAGOGASTRODUODENOSCOPY      TONSILLECTOMY         Family History   Problem Relation Name Age of Onset    Heart disease Mother      Cancer Mother          colon     COPD Father      Emphysema Father      Seizures Son      Seizures Brother      Hypertension Brother         Social History     Socioeconomic History    Marital status: Single   Occupational History     Employer: [employer]   Tobacco Use    Smoking status: Former     Current packs/day: 0.00     Average packs/day: 1.5 packs/day for  35.0 years (52.5 ttl pk-yrs)     Types: Cigarettes     Start date: 2/4/1974     Quit date: 2/4/2009     Years since quitting: 15.6    Smokeless tobacco: Never   Substance and Sexual Activity    Alcohol use: Yes     Comment: revious history of heavy/daily alcohol use on and off for many years, currently drinking 2-3 servings most nights of the week with some periods of abstinence    Drug use: No     Social Determinants of Health     Financial Resource Strain: Low Risk  (8/23/2023)    Overall Financial Resource Strain (CARDIA)     Difficulty of Paying Living Expenses: Not hard at all   Food Insecurity: No Food Insecurity (8/23/2023)    Hunger Vital Sign     Worried About Running Out of Food in the Last Year: Never true     Ran Out of Food in the Last Year: Never true   Transportation Needs: No Transportation Needs (8/23/2023)    PRAPARE - Transportation     Lack of Transportation (Medical): No     Lack of Transportation (Non-Medical): No   Physical Activity: Insufficiently Active (8/23/2023)    Exercise Vital Sign     Days of Exercise per Week: 2 days     Minutes of Exercise per Session: 20 min   Housing Stability: Low Risk  (8/23/2023)    Housing Stability Vital Sign     Unable to Pay for Housing in the Last Year: No     Number of Places Lived in the Last Year: 1     Unstable Housing in the Last Year: No       Allergies:  Patient has no known allergies.    Medications:    Current Outpatient Medications:     albuterol (PROVENTIL/VENTOLIN HFA) 90 mcg/actuation inhaler, Inhale 2 puffs into the lungs every 6 (six) hours as needed for Wheezing. Rescue, Disp: 8.5 g, Rfl: 6    benzonatate (TESSALON) 100 MG capsule, Take 2 capsules (200 mg total) by mouth 3 (three) times daily as needed for Cough., Disp: 30 capsule, Rfl: 0    fluticasone furoate-vilanteroL (BREO ELLIPTA) 200-25 mcg/dose DsDv diskus inhaler, INHALE 1 PUFF INTO THE LUNGS EVERY DAY CONTROLLER, Disp: 60 each, Rfl: 11    INCRUSE ELLIPTA 62.5 mcg/actuation  inhalation capsule, INHALE 1 PUFF INTO THE LUNGS EVERY DAY, Disp: 30 each, Rfl: 11    ipratropium (ATROVENT) 42 mcg (0.06 %) nasal spray, 2 sprays by Nasal route 3 (three) times daily., Disp: 30 mL, Rfl: 0    lisinopriL (PRINIVIL,ZESTRIL) 20 MG tablet, TAKE 1 TABLET BY MOUTH EVERY DAY, Disp: 90 tablet, Rfl: 0    omeprazole 20 mg TbEC, Take by mouth. 1 Tablet, Delayed Release (E.C.) Oral Every day, Disp: , Rfl:     predniSONE (DELTASONE) 20 MG tablet, Take 1 tablet (20 mg total) by mouth 2 (two) times daily., Disp: 10 tablet, Rfl: 0    tamsulosin (FLOMAX) 0.4 mg Cap, Take 1 capsule (0.4 mg total) by mouth once daily. 30 minutes after dinner, Disp: 90 capsule, Rfl: 3    PHYSICAL EXAMINATION:  Physical Exam  Constitutional:       Appearance: Normal appearance.   HENT:      Head: Normocephalic and atraumatic.      Right Ear: External ear normal.      Left Ear: External ear normal.      Nose: Nose normal.      Mouth/Throat:      Mouth: Mucous membranes are moist.   Pulmonary:      Effort: Pulmonary effort is normal. No respiratory distress.   Skin:     General: Skin is warm and dry.   Neurological:      General: No focal deficit present.      Mental Status: He is alert and oriented to person, place, and time.   Psychiatric:         Mood and Affect: Mood normal.         Behavior: Behavior normal.         Lab Results   Component Value Date    PSA 0.65 06/14/2021    PSA 0.50 08/20/2018    PSA 0.22 04/29/2016       Lab Results   Component Value Date    CREATININE 1.1 01/10/2024    CREATININE 1.1 01/10/2024    EGFRNORACEVR >60.0 01/10/2024    EGFRNORACEVR >60.0 01/10/2024               IMPRESSION:    Encounter Diagnoses   Name Primary?    BPH with urinary obstruction Yes         Assessment:       1. BPH with urinary obstruction        Plan:   - Continue 0.4 mg nightly Flomax as prescribed.    RTC 1 year or sooner PRN    I spent 30 minutes with the patient of which more than half was spent in direct consultation with the  patient in regards to our treatment and plan.

## 2024-09-25 DIAGNOSIS — I10 ESSENTIAL HYPERTENSION: ICD-10-CM

## 2024-09-25 RX ORDER — LISINOPRIL 20 MG/1
TABLET ORAL
Qty: 90 TABLET | Refills: 0 | Status: SHIPPED | OUTPATIENT
Start: 2024-09-25

## 2024-10-17 ENCOUNTER — HOSPITAL ENCOUNTER (OUTPATIENT)
Dept: RADIOLOGY | Facility: HOSPITAL | Age: 69
Discharge: HOME | End: 2024-10-17
Attending: INTERNAL MEDICINE
Payer: MEDICARE

## 2024-10-17 DIAGNOSIS — Z87.891 HX OF TOBACCO USE, PRESENTING HAZARDS TO HEALTH: ICD-10-CM

## 2024-10-17 PROCEDURE — 71271 CT THORAX LUNG CANCER SCR C-: CPT | Mod: TC

## 2024-11-14 DIAGNOSIS — J43.2 CENTRILOBULAR EMPHYSEMA: ICD-10-CM

## 2024-11-17 RX ORDER — ALBUTEROL SULFATE 90 UG/1
INHALANT RESPIRATORY (INHALATION)
Qty: 8.5 G | Refills: 6 | Status: SHIPPED | OUTPATIENT
Start: 2024-11-17

## 2024-12-25 DIAGNOSIS — I10 ESSENTIAL HYPERTENSION: ICD-10-CM

## 2024-12-26 RX ORDER — LISINOPRIL 20 MG/1
TABLET ORAL
Qty: 90 TABLET | Refills: 0 | Status: SHIPPED | OUTPATIENT
Start: 2024-12-26

## 2025-02-21 DIAGNOSIS — Z00.00 ENCOUNTER FOR MEDICARE ANNUAL WELLNESS EXAM: ICD-10-CM

## 2025-03-17 ENCOUNTER — TELEPHONE (OUTPATIENT)
Dept: PULMONOLOGY | Facility: CLINIC | Age: 70
End: 2025-03-17
Payer: MEDICARE

## 2025-03-17 ENCOUNTER — TELEPHONE (OUTPATIENT)
Dept: INTERNAL MEDICINE | Facility: CLINIC | Age: 70
End: 2025-03-17
Payer: MEDICARE

## 2025-03-17 DIAGNOSIS — Z12.2 ENCOUNTER FOR SCREENING FOR LUNG CANCER: Primary | ICD-10-CM

## 2025-03-17 NOTE — TELEPHONE ENCOUNTER
----- Message from Pily Cardoza sent at 3/17/2025  9:48 AM CDT -----  Regarding: Follow up LDCT  Patient with previous LDCT 10/22/24. Now due for a follow up LDCT/CT scan.  Please place order to schedule.[facility] Nav[facility]

## 2025-03-21 ENCOUNTER — OFFICE VISIT (OUTPATIENT)
Dept: INTERNAL MEDICINE | Facility: CLINIC | Age: 70
End: 2025-03-21
Payer: MEDICARE

## 2025-03-21 VITALS
WEIGHT: 184.31 LBS | DIASTOLIC BLOOD PRESSURE: 88 MMHG | BODY MASS INDEX: 27.93 KG/M2 | HEART RATE: 70 BPM | SYSTOLIC BLOOD PRESSURE: 158 MMHG | OXYGEN SATURATION: 99 % | HEIGHT: 68 IN

## 2025-03-21 DIAGNOSIS — Z12.5 PROSTATE CANCER SCREENING: ICD-10-CM

## 2025-03-21 DIAGNOSIS — Z00.00 ANNUAL PHYSICAL EXAM: Primary | ICD-10-CM

## 2025-03-21 DIAGNOSIS — D69.2 PURPURA: ICD-10-CM

## 2025-03-21 DIAGNOSIS — R60.9 EDEMA, UNSPECIFIED TYPE: ICD-10-CM

## 2025-03-21 DIAGNOSIS — Z87.891 HX OF TOBACCO USE, PRESENTING HAZARDS TO HEALTH: ICD-10-CM

## 2025-03-21 DIAGNOSIS — Z12.11 COLON CANCER SCREENING: ICD-10-CM

## 2025-03-21 DIAGNOSIS — I70.0 AORTIC ATHEROSCLEROSIS: ICD-10-CM

## 2025-03-21 DIAGNOSIS — I10 PRIMARY HYPERTENSION: ICD-10-CM

## 2025-03-21 DIAGNOSIS — Z86.0100 HISTORY OF COLON POLYPS: ICD-10-CM

## 2025-03-21 DIAGNOSIS — R73.9 HYPERGLYCEMIA: ICD-10-CM

## 2025-03-21 DIAGNOSIS — K76.0 FATTY LIVER: ICD-10-CM

## 2025-03-21 DIAGNOSIS — J43.2 CENTRILOBULAR EMPHYSEMA: ICD-10-CM

## 2025-03-21 DIAGNOSIS — F10.20 ALCOHOL DEPENDENCE, UNCOMPLICATED: ICD-10-CM

## 2025-03-21 PROCEDURE — 99214 OFFICE O/P EST MOD 30 MIN: CPT | Mod: PBBFAC | Performed by: INTERNAL MEDICINE

## 2025-03-21 PROCEDURE — 99999 PR PBB SHADOW E&M-EST. PATIENT-LVL IV: CPT | Mod: PBBFAC,,, | Performed by: INTERNAL MEDICINE

## 2025-03-21 RX ORDER — HYDROCHLOROTHIAZIDE 12.5 MG/1
12.5 TABLET ORAL DAILY
Qty: 90 TABLET | Refills: 1 | Status: SHIPPED | OUTPATIENT
Start: 2025-03-21 | End: 2026-03-21

## 2025-03-21 RX ORDER — LISINOPRIL 40 MG/1
40 TABLET ORAL DAILY
Qty: 90 TABLET | Refills: 1 | Status: SHIPPED | OUTPATIENT
Start: 2025-03-21 | End: 2026-03-21

## 2025-03-21 NOTE — PROGRESS NOTES
"Subjective:       Patient ID: Nilo Hoover is a 69 y.o. male.    Chief Complaint: Annual Exam  This is a 69-year-old who presents today for physical.  He reports that he has not really been monitoring his home pressure regularly as his cuff stopped working and he has not gotten a new 1.  He has tried to reduce his alcohol intake but does still drink regularly he follows with hepatology for his fatty liver he quit smoking years ago and does continue to follow with Pulmonary for his COPD and his lung cancer screening his last CT showed irregularity needing a repeat follow-up in 6 months.  He is following with Urology for urinary frequency and has Flomax but isn't taking it consistently.  He has episodes where he gets short of breath more so on some occasions but does pretty well with his inhalers recently he has been having some more seasonal allergies.  He denies chest pain or productive cough.    HPI  Review of Systems   Respiratory:  Positive for shortness of breath.    Cardiovascular:  Positive for leg swelling. Negative for chest pain.   Gastrointestinal:  Negative for abdominal pain and blood in stool.       Objective:      Blood pressure (!) 158/88, pulse 70, height 5' 8" (1.727 m), weight 83.6 kg (184 lb 4.9 oz), SpO2 99%.   Physical Exam  Constitutional:       General: He is not in acute distress.  HENT:      Head: Normocephalic.      Mouth/Throat:      Pharynx: Oropharynx is clear.   Eyes:      General: No scleral icterus.  Cardiovascular:      Rate and Rhythm: Normal rate and regular rhythm.      Heart sounds: Normal heart sounds. No murmur heard.     No friction rub. No gallop.   Pulmonary:      Effort: Pulmonary effort is normal. No respiratory distress.      Breath sounds: Normal breath sounds.      Comments: Decreased bs   Abdominal:      General: Bowel sounds are normal.      Palpations: Abdomen is soft. There is no mass.      Tenderness: There is no abdominal tenderness.   Musculoskeletal:      " Cervical back: Neck supple.      Comments: Edema    Skin:     Findings: No erythema.   Neurological:      Mental Status: He is alert.   Psychiatric:         Mood and Affect: Mood normal.         Assessment:       1. Annual physical exam    2. Colon cancer screening    3. Fatty liver    4. Centrilobular emphysema    5. Primary hypertension    6. Hyperglycemia    7. Prostate cancer screening    8. Edema, unspecified type    9. History of colon polyps    10. Hx of tobacco use, presenting hazards to health    11. Alcohol dependence, uncomplicated    12. Purpura    13. Aortic atherosclerosis        Plan:       Nilo was seen today for annual exam.    Diagnoses and all orders for this visit:    Annual physical exam  -     CBC Auto Differential; Future  -     Comprehensive Metabolic Panel; Future  -     Hemoglobin A1C; Future  -     Lipid Panel; Future  -     TSH; Future    Colon cancer screening  -     Ambulatory referral/consult to Endo Procedure ; Future    Fatty liver  History of continues to follow with hepatology    Centrilobular emphysema  COPD  Discussed he remains on Breo Incruse albuterol when needed no longer smokes and follows with Pulmonary    Primary hypertension  Blood pressure elevated will increase his lisinopril from 20 to 40 and add in hydrochlorothiazide resume home monitoring  -     CBC Auto Differential; Future  -     Comprehensive Metabolic Panel; Future  -     Hemoglobin A1C; Future  -     Lipid Panel; Future  -     TSH; Future  -     lisinopriL (PRINIVIL,ZESTRIL) 40 MG tablet; Take 1 tablet (40 mg total) by mouth once daily.  -     hydroCHLOROthiazide 12.5 MG Tab; Take 1 tablet (12.5 mg total) by mouth once daily.  -     Echo; Future    Hyperglycemia  -     Hemoglobin A1C; Future    Prostate cancer screening  -     PSA, Screening; Future    Edema, unspecified type  Maybe some diastolic dysfunction leg elevation and will increase his lisinopril and add in hydrochlorothiazide  -      lisinopriL (PRINIVIL,ZESTRIL) 40 MG tablet; Take 1 tablet (40 mg total) by mouth once daily.  -     hydroCHLOROthiazide 12.5 MG Tab; Take 1 tablet (12.5 mg total) by mouth once daily.    History of colon polyps  History of he is due for colonoscopy order placed for scheduling    Hx of tobacco use, presenting hazards to health  He quit smoking previously order in for his lung cancer screening surveillance due in April    Alcohol dependence, uncomplicated  He has cut back on alcohol use discussed avoidance due to fatty liver    Purpura  Stable    Aortic atherosclerosis  History of update labs    Bph hx follwos with urology  Add psa he has flomax not taking regularly     Work on weight loss dietary measures    Follow-up recheck blood pressure and proximally 8-12 weeks call with elevations resume home monitoring low-sodium and exercise

## 2025-03-24 ENCOUNTER — TELEPHONE (OUTPATIENT)
Dept: ENDOSCOPY | Facility: HOSPITAL | Age: 70
End: 2025-03-24

## 2025-03-24 ENCOUNTER — CLINICAL SUPPORT (OUTPATIENT)
Dept: ENDOSCOPY | Facility: HOSPITAL | Age: 70
End: 2025-03-24
Attending: INTERNAL MEDICINE
Payer: MEDICARE

## 2025-03-24 VITALS — WEIGHT: 182 LBS | HEIGHT: 68 IN | BODY MASS INDEX: 27.58 KG/M2

## 2025-03-24 DIAGNOSIS — Z12.11 COLON CANCER SCREENING: ICD-10-CM

## 2025-03-24 RX ORDER — SODIUM, POTASSIUM,MAG SULFATES 17.5-3.13G
1 SOLUTION, RECONSTITUTED, ORAL ORAL DAILY
Qty: 1 KIT | Refills: 0 | Status: SHIPPED | OUTPATIENT
Start: 2025-03-24 | End: 2025-03-26

## 2025-03-24 NOTE — TELEPHONE ENCOUNTER
Referral for procedure from PAT appointment      Spoke to patient to schedule procedure(s) Colonoscopy       Physician to perform procedure(s) Dr. BOGDAN Alexander  Date of Procedure (s) 4/24/25  Arrival Time 12:55 PM  Time of Procedure(s) 1:55 PM   Location of Procedure(s) [place] 4th Floor  Type of Rx Prep sent to patient: Suprep  Instructions provided to patient via [facility] and Email    Patient was informed on the following information and verbalized understanding. Screening questionnaire reviewed with patient and complete. If procedure requires anesthesia, a responsible adult needs to be present to accompany the patient home, patient cannot drive after receiving anesthesia. Appointment details are tentative, especially check-in time. Patient will receive a prep-op call 7 days prior to confirm check-in time for procedure. If applicable the patient should contact their pharmacy to verify Rx for procedure prep is ready for pick-up. Patient was advised to call the scheduling department at [phone] if pharmacy states no Rx is available. Patient was advised to call the endoscopy scheduling department if any questions or concerns arise.      [facility] Scheduling Department

## 2025-03-26 DIAGNOSIS — I10 ESSENTIAL HYPERTENSION: ICD-10-CM

## 2025-03-26 RX ORDER — LISINOPRIL 20 MG/1
20 TABLET ORAL
Qty: 90 TABLET | Refills: 0 | OUTPATIENT
Start: 2025-03-26

## 2025-03-26 RX ORDER — UMECLIDINIUM 62.5 UG/1
AEROSOL, POWDER ORAL
Qty: 30 EACH | Refills: 11 | Status: SHIPPED | OUTPATIENT
Start: 2025-03-26

## 2025-03-26 NOTE — TELEPHONE ENCOUNTER
Care Due:                  Date            Visit Type   Department     Provider  --------------------------------------------------------------------------------                                EP -                              [facility] INTERNAL  Yasmin Dobbins  Last Visit: 03-      CARE ([facility])   MEDICINE       Crescencio                              [facility] INTERNAL  Yasmin Dobbins  Next Visit: 06-      CARE ([facility])   MEDICINE       Crescencio                                                            Last  Test          Frequency    Reason                     Performed    Due Date  --------------------------------------------------------------------------------    CMP.........  12 months..  hydroCHLOROthiazide,       01-   01-                             lisinopriL...............    [employer] Embedded Care Due Messages. Reference number: [id number].   3/26/2025 5:26:27 AM CDT   I have personally seen and examined this patient.  I have fully participated in the care of this patient. I have reviewed all pertinent clinical information, including history, physical exam, plan and the Resident’s note and agree except as noted.

## 2025-03-27 ENCOUNTER — LAB VISIT (OUTPATIENT)
Dept: LAB | Facility: HOSPITAL | Age: 70
End: 2025-03-27
Attending: INTERNAL MEDICINE
Payer: MEDICARE

## 2025-03-27 DIAGNOSIS — R73.9 HYPERGLYCEMIA: ICD-10-CM

## 2025-03-27 DIAGNOSIS — Z00.00 ANNUAL PHYSICAL EXAM: ICD-10-CM

## 2025-03-27 DIAGNOSIS — Z12.5 PROSTATE CANCER SCREENING: ICD-10-CM

## 2025-03-27 DIAGNOSIS — I10 PRIMARY HYPERTENSION: ICD-10-CM

## 2025-03-27 LAB
ABSOLUTE EOSINOPHIL (OHS): 0.33 K/UL
ABSOLUTE MONOCYTE (OHS): 0.84 K/UL (ref 0.3–1)
ABSOLUTE NEUTROPHIL COUNT (OHS): 7.22 K/UL (ref 1.8–7.7)
ALBUMIN SERPL BCP-MCNC: 4 G/DL (ref 3.5–5.2)
ALP SERPL-CCNC: 62 UNIT/L (ref 40–150)
ALT SERPL W/O P-5'-P-CCNC: 17 UNIT/L (ref 10–44)
ANION GAP (OHS): 13 MMOL/L (ref 8–16)
AST SERPL-CCNC: 23 UNIT/L (ref 11–45)
BASOPHILS # BLD AUTO: 0.07 K/UL
BASOPHILS NFR BLD AUTO: 0.6 %
BILIRUB SERPL-MCNC: 0.9 MG/DL (ref 0.1–1)
BUN SERPL-MCNC: 16 MG/DL (ref 8–23)
CALCIUM SERPL-MCNC: 9.4 MG/DL (ref 8.7–10.5)
CHLORIDE SERPL-SCNC: 97 MMOL/L (ref 95–110)
CHOLEST SERPL-MCNC: 192 MG/DL (ref 120–199)
CHOLEST/HDLC SERPL: 2.6 {RATIO} (ref 2–5)
CO2 SERPL-SCNC: 27 MMOL/L (ref 23–29)
CREAT SERPL-MCNC: 1.2 MG/DL (ref 0.5–1.4)
EAG (OHS): 103 MG/DL (ref 68–131)
ERYTHROCYTE [DISTWIDTH] IN BLOOD BY AUTOMATED COUNT: 12 % (ref 11.5–14.5)
GFR SERPLBLD CREATININE-BSD FMLA CKD-EPI: >60 ML/MIN/1.73/M2
GLUCOSE SERPL-MCNC: 111 MG/DL (ref 70–110)
HBA1C MFR BLD: 5.2 % (ref 4–5.6)
HCT VFR BLD AUTO: 47.5 % (ref 40–54)
HDLC SERPL-MCNC: 73 MG/DL (ref 40–75)
HDLC SERPL: 38 % (ref 20–50)
HGB BLD-MCNC: 16.3 GM/DL (ref 14–18)
IMM GRANULOCYTES # BLD AUTO: 0.04 K/UL (ref 0–0.04)
IMM GRANULOCYTES NFR BLD AUTO: 0.4 % (ref 0–0.5)
LDLC SERPL CALC-MCNC: 68.8 MG/DL (ref 63–159)
LYMPHOCYTES # BLD AUTO: 2.59 K/UL (ref 1–4.8)
MCH RBC QN AUTO: 34.6 PG (ref 27–50)
MCHC RBC AUTO-ENTMCNC: 34.3 G/DL (ref 32–36)
MCV RBC AUTO: 101 FL (ref 82–98)
NONHDLC SERPL-MCNC: 119 MG/DL
NUCLEATED RBC (/100WBC) (OHS): 0 /100 WBC
PLATELET # BLD AUTO: 302 K/UL (ref 150–450)
PMV BLD AUTO: 10.3 FL (ref 9.2–12.9)
POTASSIUM SERPL-SCNC: 4 MMOL/L (ref 3.5–5.1)
PROT SERPL-MCNC: 7.9 GM/DL (ref 6–8.4)
PSA SERPL-MCNC: 0.43 NG/ML
RBC # BLD AUTO: 4.71 M/UL (ref 4.6–6.2)
RELATIVE EOSINOPHIL (OHS): 3 %
RELATIVE LYMPHOCYTE (OHS): 23.4 % (ref 18–48)
RELATIVE MONOCYTE (OHS): 7.6 % (ref 4–15)
RELATIVE NEUTROPHIL (OHS): 65 % (ref 38–73)
SODIUM SERPL-SCNC: 137 MMOL/L (ref 136–145)
TRIGL SERPL-MCNC: 251 MG/DL (ref 30–150)
TSH SERPL-ACNC: 1.34 UIU/ML (ref 0.4–4)
WBC # BLD AUTO: 11.09 K/UL (ref 3.9–12.7)

## 2025-03-27 PROCEDURE — 85025 COMPLETE CBC W/AUTO DIFF WBC: CPT

## 2025-03-27 PROCEDURE — 84155 ASSAY OF PROTEIN SERUM: CPT

## 2025-03-27 PROCEDURE — 36415 COLL VENOUS BLD VENIPUNCTURE: CPT

## 2025-03-27 PROCEDURE — 80061 LIPID PANEL: CPT

## 2025-03-27 PROCEDURE — 84443 ASSAY THYROID STIM HORMONE: CPT

## 2025-03-27 PROCEDURE — 84153 ASSAY OF PSA TOTAL: CPT

## 2025-03-27 PROCEDURE — 83036 HEMOGLOBIN GLYCOSYLATED A1C: CPT

## 2025-03-28 ENCOUNTER — RESULTS FOLLOW-UP (OUTPATIENT)
Dept: INTERNAL MEDICINE | Facility: CLINIC | Age: 70
End: 2025-03-28

## 2025-03-28 ENCOUNTER — TELEPHONE (OUTPATIENT)
Dept: INTERNAL MEDICINE | Facility: CLINIC | Age: 70
End: 2025-03-28
Payer: MEDICARE

## 2025-03-28 NOTE — TELEPHONE ENCOUNTER
----- Message from Yasmin Prather MD sent at 3/28/2025  7:35 AM CDT -----  Call and notify pt his labs were acceptable /stable  No new concern  Triglicerides slightly up work on exercise and reduce alcohol intake rest of cholesterol looke fine   Prostate cancer screen normal  Blood sugar acceptable range overall   ----- Message -----  From: Lab, Background User  Sent: 3/27/2025   2:57 PM CDT  To: Yasmin Prather MD

## 2025-04-03 ENCOUNTER — HOSPITAL ENCOUNTER (OUTPATIENT)
Dept: CARDIOLOGY | Facility: HOSPITAL | Age: 70
Discharge: HOME | End: 2025-04-03
Attending: INTERNAL MEDICINE
Payer: MEDICARE

## 2025-04-03 VITALS
BODY MASS INDEX: 28.37 KG/M2 | WEIGHT: 180.75 LBS | HEART RATE: 85 BPM | SYSTOLIC BLOOD PRESSURE: 158 MMHG | DIASTOLIC BLOOD PRESSURE: 88 MMHG | HEIGHT: 67 IN

## 2025-04-03 DIAGNOSIS — I10 PRIMARY HYPERTENSION: ICD-10-CM

## 2025-04-03 LAB
ASCENDING AORTA: 3.5 CM
AV AREA BY CONTINUOUS VTI: 3.9 CM2
AV INDEX (PROSTH): 0.84
AV LVOT MEAN GRADIENT: 2 MMHG
AV LVOT PEAK GRADIENT: 3 MMHG
AV MEAN GRADIENT: 3 MMHG
AV PEAK GRADIENT: 4 MMHG
AV VALVE AREA BY VELOCITY RATIO: 4.4 CM²
AV VALVE AREA: 4.1 CM2
AV VELOCITY RATIO: 0.9
BSA FOR ECHO PROCEDURE: 1.97 M2
CV ECHO LV RWT: 0.38 CM
DOP CALC AO PEAK VEL: 1 M/S
DOP CALC AO VTI: 17.2 CM
DOP CALC LVOT AREA: 4.9 CM2
DOP CALC LVOT DIAMETER: 2.5 CM
DOP CALC LVOT PEAK VEL: 0.9 M/S
DOP CALC LVOT STROKE VOLUME: 70.7 CM3
DOP CALCLVOT PEAK VEL VTI: 14.4 CM
E WAVE DECELERATION TIME: 222 MS
E/A RATIO: 0.59
E/E' RATIO: 7 M/S
ECHO EF ESTIMATED: 71 %
ECHO LV POSTERIOR WALL: 0.8 CM (ref 0.6–1.1)
FRACTIONAL SHORTENING: 40.5 % (ref 28–44)
INTERVENTRICULAR SEPTUM: 0.8 CM (ref 0.6–1.1)
IVC DIAMETER: 0.96 CM
IVRT: 105 MS
LA MAJOR: 4.2 CM
LA MINOR: 4.5 CM
LA WIDTH: 3.5 CM
LEFT ATRIUM SIZE: 3.3 CM
LEFT ATRIUM VOLUME INDEX MOD: 20 ML/M2
LEFT ATRIUM VOLUME INDEX: 22 ML/M2
LEFT ATRIUM VOLUME MOD: 39 ML
LEFT ATRIUM VOLUME: 43 CM3
LEFT INTERNAL DIMENSION IN SYSTOLE: 2.5 CM (ref 2.1–4)
LEFT VENTRICLE DIASTOLIC VOLUME INDEX: 39.18 ML/M2
LEFT VENTRICLE DIASTOLIC VOLUME: 76 ML
LEFT VENTRICLE MASS INDEX: 52.2 G/M2
LEFT VENTRICLE SYSTOLIC VOLUME INDEX: 11.3 ML/M2
LEFT VENTRICLE SYSTOLIC VOLUME: 22 ML
LEFT VENTRICULAR INTERNAL DIMENSION IN DIASTOLE: 4.2 CM (ref 3.5–6)
LEFT VENTRICULAR MASS: 101.3 G
LV LATERAL E/E' RATIO: 5.6
LV SEPTAL E/E' RATIO: 8
MV A" WAVE DURATION": 125.59 MS
MV PEAK A VEL: 0.95 M/S
MV PEAK E VEL: 0.56 M/S
OHS CV RV/LV RATIO: 0.83 CM
PISA TR MAX VEL: 2.2 M/S
PULM VEIN A" WAVE DURATION": 125.59 MS
PULM VEIN S/D RATIO: 1.93
PULMONIC VEIN PEAK A VELOCITY: 0.3 M/S
PV PEAK D VEL: 0.3 M/S
PV PEAK S VEL: 0.58 M/S
RA MAJOR: 3.19 CM
RA PRESSURE ESTIMATED: 3 MMHG
RA WIDTH: 2.67 CM
RIGHT ATRIAL AREA: 8.9 CM2
RIGHT VENTRICLE DIASTOLIC BASEL DIMENSION: 3.5 CM
RV TB RVSP: 5 MMHG
RV TISSUE DOPPLER FREE WALL SYSTOLIC VELOCITY 1 (APICAL 4 CHAMBER VIEW): 9.75 CM/S
SINUS: 3.67 CM
STJ: 2.92 CM
TDI LATERAL: 0.1 M/S
TDI SEPTAL: 0.07 M/S
TDI: 0.09 M/S
TRICUSPID ANNULAR PLANE SYSTOLIC EXCURSION: 1.03 CM
TV PEAK GRADIENT: 20 MMHG
TV REST PULMONARY ARTERY PRESSURE: 22 MMHG
Z-SCORE OF LEFT VENTRICULAR DIMENSION IN END DIASTOLE: -2.72
Z-SCORE OF LEFT VENTRICULAR DIMENSION IN END SYSTOLE: -2.41

## 2025-04-03 PROCEDURE — 93306 TTE W/DOPPLER COMPLETE: CPT

## 2025-04-03 PROCEDURE — 93306 TTE W/DOPPLER COMPLETE: CPT | Mod: 26,,, | Performed by: INTERNAL MEDICINE

## 2025-04-07 ENCOUNTER — TELEPHONE (OUTPATIENT)
Dept: INTERNAL MEDICINE | Facility: CLINIC | Age: 70
End: 2025-04-07
Payer: MEDICARE

## 2025-04-07 NOTE — TELEPHONE ENCOUNTER
----- Message from Yasmin Prather MD sent at 4/7/2025  7:59 AM CDT -----  Call and notify pt his cardiac echo was normal   ----- Message -----  From: Giselle Carranza MD  Sent: 4/3/2025   1:54 PM CDT  To: Yasmin Prather MD

## 2025-04-21 RX ORDER — UMECLIDINIUM 62.5 UG/1
AEROSOL, POWDER ORAL
Qty: 30 EACH | Refills: 11 | Status: SHIPPED | OUTPATIENT
Start: 2025-04-21

## 2025-04-21 NOTE — TELEPHONE ENCOUNTER
----- Message from KyNoahkelly sent at 4/21/2025 12:15 PM CDT -----  Consult/AdvisoryName Of Caller:Nilo Contact Preference:[phone] of call: Pt was needing a refill on INCRUSE ELLIPTA 62.5 mcg/actuation inhalation capsule and the pharmacy stated he needs  and he also stated he is out the Authorization Pharmacy ContactTelephone [phone]

## 2025-04-24 ENCOUNTER — ANESTHESIA EVENT (OUTPATIENT)
Dept: ENDOSCOPY | Facility: HOSPITAL | Age: 70
End: 2025-04-24
Payer: MEDICARE

## 2025-04-24 ENCOUNTER — ANESTHESIA (OUTPATIENT)
Dept: ENDOSCOPY | Facility: HOSPITAL | Age: 70
End: 2025-04-24
Payer: MEDICARE

## 2025-04-24 ENCOUNTER — HOSPITAL ENCOUNTER (OUTPATIENT)
Facility: HOSPITAL | Age: 70
Discharge: HOME | End: 2025-04-24
Attending: INTERNAL MEDICINE | Admitting: INTERNAL MEDICINE
Payer: MEDICARE

## 2025-04-24 VITALS
DIASTOLIC BLOOD PRESSURE: 81 MMHG | SYSTOLIC BLOOD PRESSURE: 169 MMHG | TEMPERATURE: 98 F | OXYGEN SATURATION: 100 % | HEIGHT: 68 IN | HEART RATE: 60 BPM | WEIGHT: 172.88 LBS | RESPIRATION RATE: 13 BRPM | BODY MASS INDEX: 26.2 KG/M2

## 2025-04-24 DIAGNOSIS — Z12.11 ENCOUNTER FOR COLONOSCOPY DUE TO HISTORY OF COLONIC POLYP: ICD-10-CM

## 2025-04-24 DIAGNOSIS — Z86.0100 ENCOUNTER FOR COLONOSCOPY DUE TO HISTORY OF COLONIC POLYP: ICD-10-CM

## 2025-04-24 DIAGNOSIS — K63.5 COLON POLYP: ICD-10-CM

## 2025-04-24 DIAGNOSIS — Z12.11 COLON CANCER SCREENING: ICD-10-CM

## 2025-04-24 PROCEDURE — 37000008 HC ANESTHESIA 1ST 15 MINUTES: Performed by: INTERNAL MEDICINE

## 2025-04-24 PROCEDURE — 25000003 PHARM REV CODE 250: Performed by: NURSE ANESTHETIST, CERTIFIED REGISTERED

## 2025-04-24 PROCEDURE — 27201012 HC FORCEPS, HOT/COLD, DISP: Performed by: INTERNAL MEDICINE

## 2025-04-24 PROCEDURE — 37000009 HC ANESTHESIA EA ADD 15 MINS: Performed by: INTERNAL MEDICINE

## 2025-04-24 PROCEDURE — 45380 COLONOSCOPY AND BIOPSY: CPT | Mod: PT | Performed by: INTERNAL MEDICINE

## 2025-04-24 PROCEDURE — 63600175 PHARM REV CODE 636 W HCPCS: Performed by: NURSE ANESTHETIST, CERTIFIED REGISTERED

## 2025-04-24 PROCEDURE — 45380 COLONOSCOPY AND BIOPSY: CPT | Mod: PT,GC,, | Performed by: INTERNAL MEDICINE

## 2025-04-24 PROCEDURE — 88305 TISSUE EXAM BY PATHOLOGIST: CPT | Mod: TC | Performed by: INTERNAL MEDICINE

## 2025-04-24 RX ORDER — SODIUM CHLORIDE 9 MG/ML
INJECTION, SOLUTION INTRAVENOUS CONTINUOUS
Status: DISCONTINUED | OUTPATIENT
Start: 2025-04-24 | End: 2025-04-24 | Stop reason: HOSPADM

## 2025-04-24 RX ORDER — PROPOFOL 10 MG/ML
VIAL (ML) INTRAVENOUS
Status: DISCONTINUED | OUTPATIENT
Start: 2025-04-24 | End: 2025-04-24

## 2025-04-24 RX ORDER — LIDOCAINE HYDROCHLORIDE 20 MG/ML
INJECTION, SOLUTION EPIDURAL; INFILTRATION; INTRACAUDAL; PERINEURAL
Status: DISCONTINUED | OUTPATIENT
Start: 2025-04-24 | End: 2025-04-24

## 2025-04-24 RX ADMIN — LIDOCAINE HYDROCHLORIDE 100 MG: 20 INJECTION, SOLUTION EPIDURAL; INFILTRATION; INTRACAUDAL; PERINEURAL at 02:04

## 2025-04-24 RX ADMIN — PROPOFOL 200 MCG/KG/MIN: 10 INJECTION, EMULSION INTRAVENOUS at 02:04

## 2025-04-24 RX ADMIN — PROPOFOL 100 MG: 10 INJECTION, EMULSION INTRAVENOUS at 02:04

## 2025-04-24 RX ADMIN — SODIUM CHLORIDE: 0.9 INJECTION, SOLUTION INTRAVENOUS at 02:04

## 2025-04-24 NOTE — ANESTHESIA PREPROCEDURE EVALUATION
04/24/2025  Nilo Hoover is a 69 y.o., male.  Past Medical History:   Diagnosis Date    Bronchitis chronic     COPD (chronic obstructive pulmonary disease)     Degenerative disc disease     lumar spine     Erectile dysfunction     GERD (gastroesophageal reflux disease)     hx gastritis    H/O: GI bleed     HTN (hypertension) 2/26/2013    Personal history of colonic polyps 8/9    Seizures      Past Surgical History:   Procedure Laterality Date    COLONOSCOPY N/A 9/21/2018    Procedure: COLONOSCOPY;  Surgeon: Minesh Garzon MD;  Location: [address]);  Service: Endoscopy;  Laterality: N/A;  3 year f/u-past due- history of colon polyps    COLONOSCOPY W/ POLYPECTOMY      ESOPHAGOGASTRODUODENOSCOPY      TONSILLECTOMY           Pre-op Assessment    I have reviewed the Patient Summary Reports.    I have reviewed the NPO Status.   I have reviewed the Medications.     Review of Systems  Anesthesia Hx:  No problems with previous Anesthesia   History of prior surgery of interest to airway management or planning:          Denies Family Hx of Anesthesia complications.    Denies Personal Hx of Anesthesia complications.                    Cardiovascular:     Hypertension                                          Pulmonary:   COPD                     Hepatic/GI:  Bowel Prep.   GERD Liver Disease,               Neurological:       Seizures                                Endocrine:  Endocrine Normal                Physical Exam  General: Well nourished    Airway:  Mallampati: I   Mouth Opening: Normal  TM Distance: Normal  Neck ROM: Normal ROM    Dental:  Intact    Chest/Lungs:  Normal Respiratory Rate    Heart:  Rate: Normal    Anesthesia Plan  Type of Anesthesia, risks & benefits discussed:    Anesthesia Type: Gen Natural Airway  Intra-op Monitoring Plan: Standard ASA Monitors  Induction:   IV  Informed Consent: Informed consent signed with the Patient and all parties understand the risks and agree with anesthesia plan.  All questions answered.   ASA Score: 3  Day of Surgery Review of History & Physical: H&P Update referred to the surgeon/provider.    Ready For Surgery From Anesthesia Perspective.   .

## 2025-04-24 NOTE — TRANSFER OF CARE
"Anesthesia Transfer of Care Note    Patient: Nilo Hoover    Procedure(s) Performed: Procedure(s) (LRB):  COLONOSCOPY, SCREENING, HIGH RISK PATIENT (N/A)    Patient location: PACU    Anesthesia Type: general    Transport from OR: Transported from OR on room air with adequate spontaneous ventilation    Post pain: adequate analgesia    Post assessment: no apparent anesthetic complications and tolerated procedure well    Post vital signs: stable    Level of consciousness: awake and alert    Nausea/Vomiting: no nausea/vomiting    Complications: none    Transfer of care protocol was followed    Last vitals: Visit Vitals  BP (!) 159/86 (BP Location: Left arm, Patient Position: Lying)   Pulse 95   Temp 37 °C (98.6 °F) (Temporal)   Resp 17   Ht 5' 8" (1.727 m)   Wt 78.4 kg (172 lb 14.4 oz)   SpO2 98%   BMI 26.29 kg/m²     "

## 2025-04-24 NOTE — H&P
Short Stay Endoscopy History and Physical    PCP - Yasmin Prather MD    Procedure - Colonoscopy  ASA - per anesthesia  Mallampati - per anesthesia  History of Anesthesia problems - no  Family history Anesthesia problems -  no   Plan of anesthesia - General    HPI:  This is a 69 y.o. male here for evaluation of : surveillance colonoscopy.     ROS:  Constitutional: No fevers, chills  CV: No chest pain  Pulm: No shortness of breath  GI: see HPI  Derm: No rash    Medical History:  has a past medical history of Bronchitis chronic, COPD (chronic obstructive pulmonary disease), Degenerative disc disease, Erectile dysfunction, GERD (gastroesophageal reflux disease), H/O: GI bleed, HTN (hypertension) (2/26/2013), Personal history of colonic polyps (8/9), and Seizures.    Surgical History:  has a past surgical history that includes Tonsillectomy; Colonoscopy w/ polypectomy; Esophagogastroduodenoscopy; and Colonoscopy (N/A, 9/21/2018).    Family History: family history includes COPD in his father; Cancer in his mother; Emphysema in his father; Heart disease in his mother; Hypertension in his brother; Seizures in his brother and son.. Otherwise no colon cancer, inflammatory bowel disease, or GI malignancies.    Social History:  reports that he quit smoking about 16 years ago. His smoking use included cigarettes. He started smoking about 51 years ago. He has a 52.5 pack-year smoking history. He has never used smokeless tobacco. He reports current alcohol use. He reports that he does not use drugs.    Review of patient's allergies indicates:  No Known Allergies    Medications:   Prescriptions Prior to Admission[1]      Physical Exam:    Vital Signs: There were no vitals filed for this visit.    General Appearance: Well appearing in no acute distress  Abdomen: non distended     Labs:  Lab Results   Component Value Date    WBC 11.09 03/27/2025    HGB 16.3 03/27/2025    HCT 47.5 03/27/2025     03/27/2025     CHOL 192 03/27/2025    TRIG 251 (H) 03/27/2025    HDL 73 03/27/2025    ALT 17 03/27/2025    AST 23 03/27/2025     03/27/2025    K 4.0 03/27/2025    CL 97 03/27/2025    CREATININE 1.2 03/27/2025    BUN 16 03/27/2025    CO2 27 03/27/2025    TSH 1.339 03/27/2025    PSA 0.43 03/27/2025    INR 0.9 01/10/2024    HGBA1C 5.2 03/27/2025       I have explained the risks and benefits of endoscopy procedures to the patient including but not limited to bleeding, perforation, infection, and death.  The patient was asked if they understand and allowed to ask any further questions to their satisfaction.    Alexandra Duval MD         [1]   Medications Prior to Admission   Medication Sig Dispense Refill Last Dose/Taking    albuterol (PROVENTIL/VENTOLIN HFA) 90 mcg/actuation inhaler INHALE 2 PUFFS INTO THE LUNGS EVERY 6 HOURS AS NEEDED FOR WHEEZING OR RESCUE 8.5 g 6     fluticasone furoate-vilanteroL (BREO ELLIPTA) 200-25 mcg/dose DsDv diskus inhaler INHALE 1 PUFF INTO THE LUNGS EVERY DAY CONTROLLER 60 each 11     hydroCHLOROthiazide 12.5 MG Tab Take 1 tablet (12.5 mg total) by mouth once daily. 90 tablet 1     INCRUSE ELLIPTA 62.5 mcg/actuation inhalation capsule INHALE 1 PUFF INTO THE LUNGS EVERY DAY 30 each 11     ipratropium (ATROVENT) 42 mcg (0.06 %) nasal spray 2 sprays by Nasal route 3 (three) times daily. 30 mL 0     lisinopriL (PRINIVIL,ZESTRIL) 40 MG tablet Take 1 tablet (40 mg total) by mouth once daily. 90 tablet 1     omeprazole 20 mg TbEC Take by mouth. 1 Tablet, Delayed Release (E.C.) Oral Every day       tamsulosin (FLOMAX) 0.4 mg Cap Take 1 capsule (0.4 mg total) by mouth once daily. 30 minutes after dinner 90 capsule 3

## 2025-04-25 ENCOUNTER — RESULTS FOLLOW-UP (OUTPATIENT)
Dept: INTERNAL MEDICINE | Facility: CLINIC | Age: 70
End: 2025-04-25
Payer: MEDICARE

## 2025-04-25 NOTE — ANESTHESIA POSTPROCEDURE EVALUATION
Anesthesia Post Evaluation    Patient: Nilo Hoover    Procedure(s) Performed: Procedure(s) (LRB):  COLONOSCOPY, SCREENING, HIGH RISK PATIENT (N/A)    Final Anesthesia Type: general      Patient location during evaluation: GI PACU  Patient participation: Yes- Able to Participate  Level of consciousness: awake and alert  Post-procedure vital signs: reviewed and stable  Pain management: adequate  Airway patency: patent    PONV status at discharge: No PONV  Anesthetic complications: no      Cardiovascular status: hemodynamically stable  Respiratory status: unassisted, spontaneous ventilation and room air  Hydration status: euvolemic  Follow-up not needed.          Vitals Value Taken Time   /81 04/24/25 15:45   Temp 36.7 °C (98.1 °F) 04/24/25 15:15   Pulse 60 04/24/25 15:45   Resp 13 04/24/25 15:45   SpO2 100 % 04/24/25 15:45         Event Time   Out of Recovery 16:03:46         Pain/Andrew Score: Andrew Score: 10 (4/24/2025  3:34 PM)

## 2025-04-28 LAB
ESTROGEN SERPL-MCNC: NORMAL PG/ML
INSULIN SERPL-ACNC: NORMAL U[IU]/ML
LAB AP CLINICAL INFORMATION: NORMAL
LAB AP DIAGNOSIS CATEGORY: NORMAL
LAB AP GROSS DESCRIPTION: NORMAL
LAB AP PERFORMING LOCATION(S): NORMAL
LAB AP REPORT FOOTNOTES: NORMAL

## 2025-05-15 ENCOUNTER — TELEPHONE (OUTPATIENT)
Dept: PULMONOLOGY | Facility: CLINIC | Age: 70
End: 2025-05-15
Payer: MEDICARE

## 2025-05-15 NOTE — TELEPHONE ENCOUNTER
----- Message from Jacques Perdomo MD sent at 5/15/2025  8:45 AM CDT -----  Regarding: Follow up  Shequita,   Can we get a follow up appointment for this patient next available established patient visit? Thank you, Jacques

## 2025-05-15 NOTE — TELEPHONE ENCOUNTER
Attempted to contact patient in regards to scheduling a follow up visit per Dr. Perdomo. No answer. LVM for patient to return my call. Office number was provided.

## 2025-05-19 ENCOUNTER — TELEPHONE (OUTPATIENT)
Dept: PULMONOLOGY | Facility: CLINIC | Age: 70
End: 2025-05-19
Payer: MEDICARE

## 2025-05-19 NOTE — TELEPHONE ENCOUNTER
Attempted to contact patient. No answer. LVM for patient to contact the office. Office number was provided.

## 2025-05-19 NOTE — TELEPHONE ENCOUNTER
----- Message from Fany sent at 5/19/2025  3:11 PM CDT -----  Consult/AdvisoryName Of Caller:Nilo Contact Preference:[phone] of call: Pt returning a missed a call if he stated if no answer leave a msg

## 2025-05-19 NOTE — TELEPHONE ENCOUNTER
----- Message from [employer] sent at 5/19/2025 12:46 PM CDT -----  Regarding: Refill and Appt  Contact: Pt  [phone]  Pt is calling to get prior auth for rx: INCRUSE ELLIPTA 62.5 mcg/actuation inhalation capsule and a scheduled appt please [id number] - [place], [place] - [address] [place] [address] AT [facility] [place] [id number] [place] [address] [id number]: [phone] Fax: [phone]

## 2025-06-26 DIAGNOSIS — R60.9 EDEMA, UNSPECIFIED TYPE: ICD-10-CM

## 2025-06-26 DIAGNOSIS — I10 PRIMARY HYPERTENSION: ICD-10-CM

## 2025-06-26 RX ORDER — LISINOPRIL 40 MG/1
40 TABLET ORAL DAILY
Qty: 90 TABLET | Refills: 0 | Status: SHIPPED | OUTPATIENT
Start: 2025-06-26

## 2025-06-26 NOTE — TELEPHONE ENCOUNTER
Refill Routing Note   Medication(s) are not appropriate for processing by [facility] for the following reason(s):        Required vitals abnormal: 169/81     [facility] action(s):  Defer             Appointments  past 12m or future 3m with PCP    Date Provider   Last Visit   3/21/2025 Yasmin Prather MD   Next Visit   8/1/2025 Yasmin Prather MD   ED visits in past 90 days: 0        Note composed:4:27 PM 06/26/2025

## 2025-06-26 NOTE — TELEPHONE ENCOUNTER
Copied from CRM #[id number]. Topic: Medications - Medication Refill  >> Jun 26, 2025  3:25 PM Roxie wrote:  Requesting an RX refill or new RX.    Is this a refill or new RX: new    RX name and strength (copy/paste from chart):  lisinopriL (PRINIVIL,ZESTRIL) 40 MG tablet    Is this a 30 day or 90 day RX: 90    Pharmacy name and phone # (copy/paste from chart):    [id number] - [place] - [facility]  [address] [id number]  Phone: [phone] Fax: [phone]    Who called and call back number: Patient [phone]

## 2025-06-26 NOTE — TELEPHONE ENCOUNTER
No care due was identified.  [facility] Embedded Care Due Messages. Reference number: [id number].   6/26/2025 3:42:32 PM CDT

## 2025-07-03 ENCOUNTER — LAB VISIT (OUTPATIENT)
Dept: LAB | Facility: HOSPITAL | Age: 70
End: 2025-07-03
Attending: NURSE PRACTITIONER
Payer: MEDICARE

## 2025-07-03 ENCOUNTER — RESULTS FOLLOW-UP (OUTPATIENT)
Dept: HEPATOLOGY | Facility: CLINIC | Age: 70
End: 2025-07-03

## 2025-07-03 DIAGNOSIS — F10.20 ALCOHOL DEPENDENCE, UNCOMPLICATED: ICD-10-CM

## 2025-07-03 DIAGNOSIS — K76.0 FATTY LIVER: ICD-10-CM

## 2025-07-03 LAB
ALBUMIN SERPL BCP-MCNC: 3.9 G/DL (ref 3.5–5.2)
ALP SERPL-CCNC: 52 UNIT/L (ref 40–150)
ALT SERPL W/O P-5'-P-CCNC: 14 UNIT/L (ref 10–44)
AST SERPL-CCNC: 21 UNIT/L (ref 11–45)
BILIRUB DIRECT SERPL-MCNC: 0.2 MG/DL (ref 0.1–0.3)
BILIRUB SERPL-MCNC: 0.6 MG/DL (ref 0.1–1)
PROT SERPL-MCNC: 7.2 GM/DL (ref 6–8.4)

## 2025-07-03 PROCEDURE — 84075 ASSAY ALKALINE PHOSPHATASE: CPT

## 2025-07-03 PROCEDURE — 36415 COLL VENOUS BLD VENIPUNCTURE: CPT

## 2025-07-07 ENCOUNTER — TELEPHONE (OUTPATIENT)
Dept: HEPATOLOGY | Facility: CLINIC | Age: 70
End: 2025-07-07
Payer: MEDICARE

## 2025-07-07 LAB
PLPETH BLD-MCNC: 191 NG/ML
POPETH BLD-MCNC: 206 NG/ML
TOXICOLOGIST REVIEW: NORMAL

## 2025-07-07 NOTE — TELEPHONE ENCOUNTER
"Copied from CRM #[id number]. Topic: Appointments - Appointment Access  >> Jul 7, 2025  4:11 PM Adán wrote:        Reschedule Existing Appointment        Appt Date:07/10     Type of appt: All Appts     Physician:     Reason for rescheduling?  Requesting to r/s for soonest available     Caller: Self     Contact Preference:[phone]        Additional Information:  "Thank you for all that you do for our patients"  "

## 2025-07-07 NOTE — TELEPHONE ENCOUNTER
Returned call to patient. Rescheduled appointments per patient request. Mailed appointment reminder to patient.

## 2025-07-15 DIAGNOSIS — J43.2 CENTRILOBULAR EMPHYSEMA: ICD-10-CM

## 2025-07-17 RX ORDER — FLUTICASONE FUROATE AND VILANTEROL TRIFENATATE 200; 25 UG/1; UG/1
POWDER RESPIRATORY (INHALATION)
Qty: 60 EACH | Refills: 11 | Status: SHIPPED | OUTPATIENT
Start: 2025-07-17

## 2025-07-21 ENCOUNTER — PATIENT MESSAGE (OUTPATIENT)
Dept: ADMINISTRATIVE | Facility: HOSPITAL | Age: 70
End: 2025-07-21
Payer: MEDICARE

## 2025-08-01 ENCOUNTER — TELEPHONE (OUTPATIENT)
Dept: INTERNAL MEDICINE | Facility: CLINIC | Age: 70
End: 2025-08-01

## 2025-08-01 ENCOUNTER — OFFICE VISIT (OUTPATIENT)
Dept: INTERNAL MEDICINE | Facility: CLINIC | Age: 70
End: 2025-08-01
Payer: MEDICARE

## 2025-08-01 VITALS
SYSTOLIC BLOOD PRESSURE: 138 MMHG | HEART RATE: 68 BPM | HEIGHT: 68 IN | DIASTOLIC BLOOD PRESSURE: 78 MMHG | WEIGHT: 182.31 LBS | OXYGEN SATURATION: 100 % | BODY MASS INDEX: 27.63 KG/M2

## 2025-08-01 DIAGNOSIS — Z87.891 HX OF TOBACCO USE, PRESENTING HAZARDS TO HEALTH: ICD-10-CM

## 2025-08-01 DIAGNOSIS — K76.0 FATTY LIVER: ICD-10-CM

## 2025-08-01 DIAGNOSIS — I70.0 AORTIC ATHEROSCLEROSIS: ICD-10-CM

## 2025-08-01 DIAGNOSIS — J43.2 CENTRILOBULAR EMPHYSEMA: ICD-10-CM

## 2025-08-01 DIAGNOSIS — I10 PRIMARY HYPERTENSION: Primary | ICD-10-CM

## 2025-08-01 PROCEDURE — 99999 PR PBB SHADOW E&M-EST. PATIENT-LVL III: CPT | Mod: PBBFAC,,, | Performed by: INTERNAL MEDICINE

## 2025-08-01 PROCEDURE — 99213 OFFICE O/P EST LOW 20 MIN: CPT | Mod: PBBFAC | Performed by: INTERNAL MEDICINE

## 2025-08-01 RX ORDER — ROSUVASTATIN CALCIUM 20 MG/1
20 TABLET, COATED ORAL DAILY
Qty: 90 TABLET | Refills: 1 | Status: SHIPPED | OUTPATIENT
Start: 2025-08-01 | End: 2026-08-01

## 2025-08-01 NOTE — PROGRESS NOTES
"Subjective:       Patient ID: Nilo Hoover is a 70 y.o. male.    Chief Complaint: Follow-up  This is a 70-year-old who presents today for follow-up patient reports that he has been doing better with the lisinopril and hydrochlorothiazide tolerating without difficulty he went up on the dose and has had good readings at home he has a neighbor that is a nurse and reports his blood pressure was acceptable he is not very good at checking it his himself but he is taking his medicines consistently comes to the doctor and anxious situations he reports it tends to go a bit higher.  He has been feeling well and tolerating the medicines without difficulty has reduced alcohol intake and is following with hepatology for his fatty liver along with pulmonary for his COPD    Follow-up    Review of Systems   Respiratory:  Positive for shortness of breath.    Cardiovascular:  Negative for leg swelling.       Objective:      Blood pressure 138/78, pulse 68, height 5' 8" (1.727 m), weight 82.7 kg (182 lb 5.1 oz), SpO2 100%.   Physical Exam  Constitutional:       General: He is not in acute distress.  HENT:      Head: Normocephalic.      Mouth/Throat:      Pharynx: Oropharynx is clear.   Cardiovascular:      Rate and Rhythm: Normal rate and regular rhythm.      Heart sounds: Normal heart sounds. No murmur heard.     No friction rub. No gallop.   Pulmonary:      Effort: Pulmonary effort is normal. No respiratory distress.      Breath sounds: Normal breath sounds.      Comments: Decreased bs   Abdominal:      General: Bowel sounds are normal.      Palpations: Abdomen is soft. There is no mass.      Tenderness: There is no abdominal tenderness.   Skin:     Comments: Bruise arm    Neurological:      Mental Status: He is alert.       Assessment:       1. Primary hypertension    2. Aortic atherosclerosis    3. Centrilobular emphysema    4. Fatty liver    5. Hx of tobacco use, presenting hazards to health        Plan:       Nlio was " seen today for follow-up.    Diagnoses and all orders for this visit:    Aortic atherosclerosis  Discussed with patient reviewed on his imaging  We discussed well as cholesterol overall fairly good that he would benefit from statin as tolerated risks benefits reviewed will start  -     rosuvastatin (CRESTOR) 20 MG tablet; Take 1 tablet (20 mg total) by mouth once daily.  -     Comprehensive Metabolic Panel; Future  -     Lipid Panel; Future Crestor and plan to recheck labs in 8-12 weeks    Primary hypertension  We reviewed hypertension continue his increased lisinopril and hydrochlorothiazide    Centrilobular emphysema  COPD history of tobacco use following with pulmonary  History of tobacco use   He will continue pulmonary follow up   Ct lung cancer screening     Fatty liver  History of alcohol dependence he has reduced his use    Visit today included increased complexity associated with the care of the episodic problem hypertension, aortic atherosclerosis, emphysema, copd, hx tobacco use fatty liver  addressed and managing the longitudinal care of the patient due to the serious and/or complex managed problem(s)     Return for his annual sooner if concerns her blood pressure elevated

## 2025-08-01 NOTE — TELEPHONE ENCOUNTER
Reviewed and Pt   Due for repeat ct lung cancer screening  I put new order in assist in scheulding

## 2025-08-05 ENCOUNTER — HOSPITAL ENCOUNTER (OUTPATIENT)
Dept: RADIOLOGY | Facility: HOSPITAL | Age: 70
Discharge: HOME | End: 2025-08-05
Attending: INTERNAL MEDICINE
Payer: MEDICARE

## 2025-08-05 DIAGNOSIS — Z87.891 HX OF TOBACCO USE, PRESENTING HAZARDS TO HEALTH: ICD-10-CM

## 2025-08-05 PROCEDURE — 71271 CT THORAX LUNG CANCER SCR C-: CPT | Mod: 26,,, | Performed by: RADIOLOGY

## 2025-08-05 PROCEDURE — 71271 CT THORAX LUNG CANCER SCR C-: CPT | Mod: TC

## 2025-08-06 DIAGNOSIS — R91.8 ABNORMAL CT LUNG SCREENING: Primary | ICD-10-CM

## 2025-08-06 DIAGNOSIS — Z87.891 PERSONAL HISTORY OF NICOTINE DEPENDENCE: ICD-10-CM

## 2025-08-19 DIAGNOSIS — J43.2 CENTRILOBULAR EMPHYSEMA: ICD-10-CM

## 2025-08-20 RX ORDER — ALBUTEROL SULFATE 90 UG/1
INHALANT RESPIRATORY (INHALATION)
Qty: 8.5 G | Refills: 6 | Status: SHIPPED | OUTPATIENT
Start: 2025-08-20